# Patient Record
Sex: MALE | Race: WHITE | NOT HISPANIC OR LATINO
[De-identification: names, ages, dates, MRNs, and addresses within clinical notes are randomized per-mention and may not be internally consistent; named-entity substitution may affect disease eponyms.]

---

## 2019-04-23 ENCOUNTER — LABORATORY RESULT (OUTPATIENT)
Age: 69
End: 2019-04-23

## 2019-04-23 ENCOUNTER — APPOINTMENT (OUTPATIENT)
Dept: INTERNAL MEDICINE | Facility: CLINIC | Age: 69
End: 2019-04-23
Payer: MEDICARE

## 2019-04-23 VITALS
TEMPERATURE: 97.8 F | SYSTOLIC BLOOD PRESSURE: 156 MMHG | DIASTOLIC BLOOD PRESSURE: 90 MMHG | RESPIRATION RATE: 16 BRPM | BODY MASS INDEX: 30.67 KG/M2 | OXYGEN SATURATION: 98 % | HEART RATE: 75 BPM | WEIGHT: 202.38 LBS | HEIGHT: 68 IN

## 2019-04-23 VITALS — SYSTOLIC BLOOD PRESSURE: 132 MMHG | DIASTOLIC BLOOD PRESSURE: 80 MMHG

## 2019-04-23 DIAGNOSIS — Z82.5 FAMILY HISTORY OF ASTHMA AND OTHER CHRONIC LOWER RESPIRATORY DISEASES: ICD-10-CM

## 2019-04-23 DIAGNOSIS — Z83.3 FAMILY HISTORY OF DIABETES MELLITUS: ICD-10-CM

## 2019-04-23 DIAGNOSIS — Z78.9 OTHER SPECIFIED HEALTH STATUS: ICD-10-CM

## 2019-04-23 PROCEDURE — 99214 OFFICE O/P EST MOD 30 MIN: CPT | Mod: 25

## 2019-04-23 PROCEDURE — 36415 COLL VENOUS BLD VENIPUNCTURE: CPT

## 2019-04-23 NOTE — PHYSICAL EXAM
[Well Nourished] : well nourished [No Acute Distress] : no acute distress [Normal Sclera/Conjunctiva] : normal sclera/conjunctiva [Well Developed] : well developed [Well-Appearing] : well-appearing [PERRL] : pupils equal round and reactive to light [EOMI] : extraocular movements intact [Normal Outer Ear/Nose] : the outer ears and nose were normal in appearance [Normal Oropharynx] : the oropharynx was normal [No JVD] : no jugular venous distention [Supple] : supple [No Lymphadenopathy] : no lymphadenopathy [Thyroid Normal, No Nodules] : the thyroid was normal and there were no nodules present [No Respiratory Distress] : no respiratory distress  [Clear to Auscultation] : lungs were clear to auscultation bilaterally [No Accessory Muscle Use] : no accessory muscle use [Normal Rate] : normal rate  [Regular Rhythm] : with a regular rhythm [Normal S1, S2] : normal S1 and S2 [No Murmur] : no murmur heard [No Carotid Bruits] : no carotid bruits [No Abdominal Bruit] : a ~M bruit was not heard ~T in the abdomen [No Varicosities] : no varicosities [Pedal Pulses Present] : the pedal pulses are present [No Extremity Clubbing/Cyanosis] : no extremity clubbing/cyanosis [No Edema] : there was no peripheral edema [Soft] : abdomen soft [No Palpable Aorta] : no palpable aorta [Non-distended] : non-distended [Non Tender] : non-tender [No HSM] : no HSM [No Masses] : no abdominal mass palpated [Normal Bowel Sounds] : normal bowel sounds [Normal Posterior Cervical Nodes] : no posterior cervical lymphadenopathy [No CVA Tenderness] : no CVA  tenderness [Normal Anterior Cervical Nodes] : no anterior cervical lymphadenopathy [No Spinal Tenderness] : no spinal tenderness [No Joint Swelling] : no joint swelling [Grossly Normal Strength/Tone] : grossly normal strength/tone [Normal Gait] : normal gait [Coordination Grossly Intact] : coordination grossly intact [No Rash] : no rash [No Focal Deficits] : no focal deficits [Deep Tendon Reflexes (DTR)] : deep tendon reflexes were 2+ and symmetric [Normal Affect] : the affect was normal [Normal Insight/Judgement] : insight and judgment were intact [de-identified] : obese

## 2019-04-23 NOTE — ADDENDUM
[FreeTextEntry1] : I, Ernie Saldaña, acted solely as scribe for Dr. Arnoldo Yoo DO on this date 04/23/2019 10:40AM .\par \par All medical record entries made by the Scribe were at my, Dr. Arnoldo Yoo DO direction and personally dictated by me on 04/23/2019 10:40AM. I have reviewed the chart and agree that the record accurately reflects my personal performance of the history, physical exam, assessment and plan. I have also personally directed, reviewed and agreed with the chart.\par

## 2019-04-23 NOTE — PLAN
[FreeTextEntry1] : Endocrinology\par hyperlipidemia-continue Niacin 500mg TID p.o.q.d., Fenofibrate 160mg p.o.q.d., Rx to be filled pending blood work results - continue low cholesterol/low fat diet - check FLP \par hypertriglyceridemia-continue Fenofibrate 160mg p.o.q.d. and omega-3 fish oil capsules BID p.o.q.d.\par hyperglycemia-continue Metformin HCl 500mg BID p.o.q.d. - continue low carbohydrate/low sugar diet \par Hematology\par vitamin D insufficiency-continue vitamin D tablets p.o.q.d. with food \par \par check male panel, thyroid panel  \par

## 2019-04-23 NOTE — ASSESSMENT
[FreeTextEntry1] : Patient is a 68 year year old male with a past medical history as above who presents for fasting blood work and general follow-up.\par

## 2019-04-23 NOTE — HISTORY OF PRESENT ILLNESS
[de-identified] : Patient is a 68 year year old male with a past medical history as below who presents for fasting blood work and general follow-up. Last blood work done on 12/4/18 showed elevated total cholesterol (261), HDL (31), elevated triglycerides (304), elevated LDL (169), elevated hemoglobin A1C (6.2%), and a sugar level of 125. Patient states he is taking all medications as prescribed and denies any adverse reactions or side effects. Patient states he exercises regularly (attends the gym every morning and walks), but has not been able to for the past month given he was away on a trip to [place]. He also states is maintaining a well-balanced diet. He notes drinking celery juice daily.  Patient states he feels well overall with no new complaints. Patient states he is seeing urologist, Dr. Oliver tomorrow for a cystoscopy. Patient inquires about prescription refill for Fenofibrate and self-testing blood-glucose levels.  [FreeTextEntry1] : fasting blood work and general follow-up\par

## 2019-04-30 ENCOUNTER — RX RENEWAL (OUTPATIENT)
Age: 69
End: 2019-04-30

## 2019-05-01 LAB
25(OH)D3 SERPL-MCNC: 64.1 NG/ML
ALBUMIN SERPL ELPH-MCNC: 4.6 G/DL
ALP BLD-CCNC: 60 U/L
ALT SERPL-CCNC: 60 U/L
ANION GAP SERPL CALC-SCNC: 14 MMOL/L
AST SERPL-CCNC: 36 U/L
BASOPHILS # BLD AUTO: 0.08 K/UL
BASOPHILS NFR BLD AUTO: 0.9 %
BILIRUB SERPL-MCNC: 0.2 MG/DL
BUN SERPL-MCNC: 26 MG/DL
CALCIUM SERPL-MCNC: 9.9 MG/DL
CHLORIDE SERPL-SCNC: 104 MMOL/L
CHOLEST SERPL-MCNC: 269 MG/DL
CHOLEST/HDLC SERPL: 8.2 RATIO
CO2 SERPL-SCNC: 23 MMOL/L
CREAT SERPL-MCNC: 1 MG/DL
EOSINOPHIL # BLD AUTO: 0.4 K/UL
EOSINOPHIL NFR BLD AUTO: 4.7 %
ESTIMATED AVERAGE GLUCOSE: 137 MG/DL
GLUCOSE SERPL-MCNC: 109 MG/DL
HBA1C MFR BLD HPLC: 6.4 %
HCT VFR BLD CALC: 47.4 %
HDLC SERPL-MCNC: 33 MG/DL
HGB BLD-MCNC: 14.3 G/DL
IMM GRANULOCYTES NFR BLD AUTO: 0.6 %
LDLC SERPL CALC-MCNC: 180 MG/DL
LYMPHOCYTES # BLD AUTO: 2.62 K/UL
LYMPHOCYTES NFR BLD AUTO: 30.7 %
MAN DIFF?: NORMAL
MCHC RBC-ENTMCNC: 26.2 PG
MCHC RBC-ENTMCNC: 30.2 GM/DL
MCV RBC AUTO: 86.8 FL
MONOCYTES # BLD AUTO: 0.71 K/UL
MONOCYTES NFR BLD AUTO: 8.3 %
NEUTROPHILS # BLD AUTO: 4.68 K/UL
NEUTROPHILS NFR BLD AUTO: 54.8 %
PLATELET # BLD AUTO: 315 K/UL
POTASSIUM SERPL-SCNC: 4.5 MMOL/L
PROT SERPL-MCNC: 7.8 G/DL
PSA SERPL-MCNC: 0.32 NG/ML
RBC # BLD: 5.46 M/UL
RBC # FLD: 14.5 %
SODIUM SERPL-SCNC: 141 MMOL/L
T3 SERPL-MCNC: 127 NG/DL
T3FREE SERPL-MCNC: 3.24 PG/ML
T3RU NFR SERPL: 1.1 TBI
T4 FREE SERPL-MCNC: 1.1 NG/DL
T4 SERPL-MCNC: 8 UG/DL
THYROGLOB AB SERPL-ACNC: <20 IU/ML
THYROPEROXIDASE AB SERPL IA-ACNC: 18.6 IU/ML
TRIGL SERPL-MCNC: 282 MG/DL
TSH SERPL-ACNC: 1.41 UIU/ML
WBC # FLD AUTO: 8.54 K/UL

## 2019-05-01 RX ORDER — BLOOD-GLUCOSE METER
KIT MISCELLANEOUS
Qty: 1 | Refills: 0 | Status: ACTIVE | COMMUNITY
Start: 2019-05-01 | End: 1900-01-01

## 2019-05-06 ENCOUNTER — RX RENEWAL (OUTPATIENT)
Age: 69
End: 2019-05-06

## 2019-05-20 ENCOUNTER — NON-APPOINTMENT (OUTPATIENT)
Age: 69
End: 2019-05-20

## 2019-05-20 ENCOUNTER — APPOINTMENT (OUTPATIENT)
Dept: INTERNAL MEDICINE | Facility: CLINIC | Age: 69
End: 2019-05-20
Payer: MEDICARE

## 2019-05-20 VITALS
WEIGHT: 202.13 LBS | DIASTOLIC BLOOD PRESSURE: 90 MMHG | TEMPERATURE: 97.9 F | BODY MASS INDEX: 30.63 KG/M2 | SYSTOLIC BLOOD PRESSURE: 166 MMHG | HEART RATE: 67 BPM | RESPIRATION RATE: 16 BRPM | HEIGHT: 68 IN | OXYGEN SATURATION: 98 %

## 2019-05-20 VITALS — SYSTOLIC BLOOD PRESSURE: 136 MMHG | DIASTOLIC BLOOD PRESSURE: 82 MMHG

## 2019-05-20 DIAGNOSIS — Z85.51 PERSONAL HISTORY OF MALIGNANT NEOPLASM OF BLADDER: ICD-10-CM

## 2019-05-20 PROCEDURE — 93000 ELECTROCARDIOGRAM COMPLETE: CPT

## 2019-05-20 PROCEDURE — 99214 OFFICE O/P EST MOD 30 MIN: CPT | Mod: 25

## 2019-05-20 NOTE — ASSESSMENT
[Patient Optimized for Surgery] : Patient optimized for surgery [No Further Testing Recommended] : no further testing recommended [Modify medications prior to procedure] : Modify medications prior to procedure [FreeTextEntry4] : The patient is 68 year male who is a low risk surgical candidate with good functional capacity going for a low-intermediate risk surgical procedure. \par  [FreeTextEntry7] : Stop Aspirin and Fish-oil 1 week prior to procedure. Stop all other medications 1 day prior to procedure.

## 2019-05-20 NOTE — ADDENDUM
[FreeTextEntry1] : I, Ernie Saldaña, acted solely as scribe for Dr. Arnoldo Yoo DO on this date 05/20/2019 12:00PM .\par \par All medical record entries made by the Scribe were at my, Dr. Arnoldo Yoo DO direction and personally dictated by me on 05/20/2019 12:00PM. I have reviewed the chart and agree that the record accurately reflects my personal performance of the history, physical exam, assessment and plan. I have also personally directed, reviewed and agreed with the chart.\par

## 2019-05-20 NOTE — PLAN
[FreeTextEntry1] : 1. Preoperative EKG performed - see above - acceptable\par 2. The patient was instructed to stop eating prior to midnight the evening before the procedure.\par 3. The patient was advised to stop Aspirin and Fish-oil 1 week prior to the procedure. He was advised to stop all other medications on the day of procedure.\par 4. There is no medical contraindication to the planned procedure and the patient is therefore medically optimized and is therefore medically cleared for the procedure.\par \par check EKG, normal  \par Blood work done with Dr. Oliver.

## 2019-05-20 NOTE — PHYSICAL EXAM

## 2019-05-20 NOTE — HISTORY OF PRESENT ILLNESS
[No Pertinent Cardiac History] : no history of aortic stenosis, atrial fibrillation, coronary artery disease, recent myocardial infarction, or implantable device/pacemaker [No Pertinent Pulmonary History] : no history of asthma, COPD, sleep apnea, or smoking [No Adverse Anesthesia Reaction] : no adverse anesthesia reaction in self or family member [(Patient denies any chest pain, claudication, dyspnea on exertion, orthopnea, palpitations or syncope)] : Patient denies any chest pain, claudication, dyspnea on exertion, orthopnea, palpitations or syncope [Good (7-10 METs)] : Good (7-10 METs) [NSAIDs: _____] : NSAIDs: [unfilled] [Chronic Anticoagulation] : no chronic anticoagulation [Chronic Kidney Disease] : no chronic kidney disease [Diabetes] : no diabetes [FreeTextEntry1] : cystoscopy  [FreeTextEntry2] : 5/24/19 [FreeTextEntry3] : Dr. Oliver  [FreeTextEntry4] : Patient is a 68 year male with a past medical history as below who presents for preoperative evaluation prior to a cystoscopy. The procedure will be performed by Dr. Oliver at [facility]. The patient has no history of allergy or adverse reaction to anesthesia. The patient can walk many blocks and can walk up one to 2 flights of stairs without dyspnea on exertion. The patient denies chest pain or palpitations.

## 2019-05-23 ENCOUNTER — RX RENEWAL (OUTPATIENT)
Age: 69
End: 2019-05-23

## 2019-07-08 ENCOUNTER — MEDICATION RENEWAL (OUTPATIENT)
Age: 69
End: 2019-07-08

## 2019-07-15 ENCOUNTER — MEDICATION RENEWAL (OUTPATIENT)
Age: 69
End: 2019-07-15

## 2019-09-19 ENCOUNTER — LABORATORY RESULT (OUTPATIENT)
Age: 69
End: 2019-09-19

## 2019-09-19 ENCOUNTER — APPOINTMENT (OUTPATIENT)
Dept: INTERNAL MEDICINE | Facility: CLINIC | Age: 69
End: 2019-09-19
Payer: MEDICARE

## 2019-09-19 VITALS
RESPIRATION RATE: 14 BRPM | HEIGHT: 68 IN | BODY MASS INDEX: 30.39 KG/M2 | WEIGHT: 200.5 LBS | OXYGEN SATURATION: 98 % | DIASTOLIC BLOOD PRESSURE: 92 MMHG | SYSTOLIC BLOOD PRESSURE: 168 MMHG | HEART RATE: 68 BPM | TEMPERATURE: 97.9 F

## 2019-09-19 VITALS — SYSTOLIC BLOOD PRESSURE: 140 MMHG | DIASTOLIC BLOOD PRESSURE: 90 MMHG

## 2019-09-19 VITALS — DIASTOLIC BLOOD PRESSURE: 90 MMHG | SYSTOLIC BLOOD PRESSURE: 140 MMHG

## 2019-09-19 PROCEDURE — 36415 COLL VENOUS BLD VENIPUNCTURE: CPT

## 2019-09-19 PROCEDURE — 99214 OFFICE O/P EST MOD 30 MIN: CPT | Mod: 25

## 2019-09-19 NOTE — PHYSICAL EXAM
[No Acute Distress] : no acute distress [Well Nourished] : well nourished [Well Developed] : well developed [Normal Sclera/Conjunctiva] : normal sclera/conjunctiva [Well-Appearing] : well-appearing [PERRL] : pupils equal round and reactive to light [EOMI] : extraocular movements intact [Normal Outer Ear/Nose] : the outer ears and nose were normal in appearance [Normal Oropharynx] : the oropharynx was normal [No Lymphadenopathy] : no lymphadenopathy [No JVD] : no jugular venous distention [Supple] : supple [No Respiratory Distress] : no respiratory distress  [Thyroid Normal, No Nodules] : the thyroid was normal and there were no nodules present [No Accessory Muscle Use] : no accessory muscle use [Clear to Auscultation] : lungs were clear to auscultation bilaterally [Normal Rate] : normal rate  [Regular Rhythm] : with a regular rhythm [Normal S1, S2] : normal S1 and S2 [No Carotid Bruits] : no carotid bruits [No Murmur] : no murmur heard [No Abdominal Bruit] : a ~M bruit was not heard ~T in the abdomen [Pedal Pulses Present] : the pedal pulses are present [No Varicosities] : no varicosities [No Edema] : there was no peripheral edema [No Palpable Aorta] : no palpable aorta [No Extremity Clubbing/Cyanosis] : no extremity clubbing/cyanosis [Non Tender] : non-tender [Soft] : abdomen soft [Non-distended] : non-distended [No Masses] : no abdominal mass palpated [Normal Bowel Sounds] : normal bowel sounds [No HSM] : no HSM [Normal Posterior Cervical Nodes] : no posterior cervical lymphadenopathy [Normal Anterior Cervical Nodes] : no anterior cervical lymphadenopathy [No CVA Tenderness] : no CVA  tenderness [No Joint Swelling] : no joint swelling [No Spinal Tenderness] : no spinal tenderness [Grossly Normal Strength/Tone] : grossly normal strength/tone [Coordination Grossly Intact] : coordination grossly intact [No Rash] : no rash [No Focal Deficits] : no focal deficits [Normal Gait] : normal gait [Deep Tendon Reflexes (DTR)] : deep tendon reflexes were 2+ and symmetric [Normal Affect] : the affect was normal [Normal Insight/Judgement] : insight and judgment were intact [de-identified] : obese

## 2019-09-19 NOTE — PLAN
[FreeTextEntry1] : Endocrinology\par hyperglycemia - continue Metformin HCl 1000mg p.o.q.d. - continue low carbohydrate/low sugar diet - check hemoglobin A1C\par hyperlipidemia/hypertriglyceridemia - continue Fenofibrate 160mg p.o.q.d. and Niacin 500mg TID p.o.q.d. as directed - continue low cholesterol/low fat diet - check FLP \par borderline-abnormal TFTs - check thyroid panel \par vitamin D insufficiency - continue vitamin D tablets p.o.q.d. - check vitamin D\par Gastroenterology\par screening colonoscopy - follow up with gastroenterologist \par Urology\par Follow up with urologist, Dr. Oliver \par Infectious Disease\par flu vaccine - discussed, refused \par Prevnar 13 - discussed benefits of receiving the vaccine in detail, refused \par \par check male panel and thyroid panel

## 2019-09-19 NOTE — ASSESSMENT
[FreeTextEntry1] : Patient is a 69 year old male with a past medical history as above who presents for fasting blood work and general follow-up.

## 2019-09-19 NOTE — HISTORY OF PRESENT ILLNESS
[FreeTextEntry1] : fasting blood work and general follow-up\par  [de-identified] : Patient is a 69 year old male with a past medical history as below who presents for fasting blood work and general follow-up. Patient states he has been taking Metformin once daily as he noted lightheadedness/dizziness while taking the medication BID. He notes self-recorded blood-glucose levels typically range from 93 to 115. Patient notes seeing urologist, Dr. Oliver recently. Cystoscopy was done on 5/24/19. 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 Patient states he will be following up with his gastroenterologist for a screening colonoscopy. Patient states he has been trying to maintain a well-balanced, low carbohydrate diet. He notes occasional CV exercise (walking). Patient is due for a pneumonia shot.

## 2019-09-19 NOTE — ADDENDUM
[FreeTextEntry1] : I, Ernie Saldaña, acted solely as scribe for Dr. Arnoldo Yoo DO on this date 09/19/2019 10:00AM .\par \par All medical record entries made by the Scribe were at my, Dr. Anroldo Yoo DO direction and personally dictated by me on 09/19/2019 10:00AM. I have reviewed the chart and agree that the record accurately reflects my personal performance of the history, physical exam, assessment and plan. I have also personally directed, reviewed and agreed with the chart.\par

## 2019-09-23 LAB
25(OH)D3 SERPL-MCNC: 64.2 NG/ML
ALBUMIN SERPL ELPH-MCNC: 4.7 G/DL
ALP BLD-CCNC: 60 U/L
ALT SERPL-CCNC: 68 U/L
ANION GAP SERPL CALC-SCNC: 13 MMOL/L
AST SERPL-CCNC: 50 U/L
BASOPHILS # BLD AUTO: 0.07 K/UL
BASOPHILS NFR BLD AUTO: 0.9 %
BILIRUB SERPL-MCNC: 0.2 MG/DL
BUN SERPL-MCNC: 23 MG/DL
CALCIUM SERPL-MCNC: 10.4 MG/DL
CHLORIDE SERPL-SCNC: 106 MMOL/L
CHOLEST SERPL-MCNC: 246 MG/DL
CHOLEST/HDLC SERPL: 7.7 RATIO
CO2 SERPL-SCNC: 23 MMOL/L
CREAT SERPL-MCNC: 1.12 MG/DL
EOSINOPHIL # BLD AUTO: 0.45 K/UL
EOSINOPHIL NFR BLD AUTO: 5.9 %
ESTIMATED AVERAGE GLUCOSE: 140 MG/DL
GLUCOSE SERPL-MCNC: 103 MG/DL
HBA1C MFR BLD HPLC: 6.5 %
HCT VFR BLD CALC: 48.7 %
HDLC SERPL-MCNC: 32 MG/DL
HGB BLD-MCNC: 14.8 G/DL
IMM GRANULOCYTES NFR BLD AUTO: 0.3 %
LDLC SERPL CALC-MCNC: 165 MG/DL
LYMPHOCYTES # BLD AUTO: 2.56 K/UL
LYMPHOCYTES NFR BLD AUTO: 33.8 %
MAN DIFF?: NORMAL
MCHC RBC-ENTMCNC: 27.7 PG
MCHC RBC-ENTMCNC: 30.4 GM/DL
MCV RBC AUTO: 91 FL
MONOCYTES # BLD AUTO: 0.58 K/UL
MONOCYTES NFR BLD AUTO: 7.7 %
NEUTROPHILS # BLD AUTO: 3.9 K/UL
NEUTROPHILS NFR BLD AUTO: 51.4 %
PLATELET # BLD AUTO: 327 K/UL
POTASSIUM SERPL-SCNC: 5 MMOL/L
PROT SERPL-MCNC: 7.7 G/DL
PSA SERPL-MCNC: 0.36 NG/ML
RBC # BLD: 5.35 M/UL
RBC # FLD: 15.1 %
SODIUM SERPL-SCNC: 142 MMOL/L
T3 SERPL-MCNC: 118 NG/DL
T3FREE SERPL-MCNC: 2.98 PG/ML
T3RU NFR SERPL: 1.1 TBI
T4 FREE SERPL-MCNC: 1.2 NG/DL
T4 SERPL-MCNC: 7.9 UG/DL
THYROGLOB AB SERPL-ACNC: <20 IU/ML
THYROPEROXIDASE AB SERPL IA-ACNC: 19.8 IU/ML
TRIGL SERPL-MCNC: 245 MG/DL
TSH SERPL-ACNC: 1.2 UIU/ML
WBC # FLD AUTO: 7.58 K/UL

## 2019-12-06 ENCOUNTER — APPOINTMENT (OUTPATIENT)
Dept: INTERNAL MEDICINE | Facility: CLINIC | Age: 69
End: 2019-12-06
Payer: MEDICARE

## 2019-12-06 ENCOUNTER — RX RENEWAL (OUTPATIENT)
Age: 69
End: 2019-12-06

## 2019-12-06 VITALS
WEIGHT: 20 LBS | DIASTOLIC BLOOD PRESSURE: 82 MMHG | BODY MASS INDEX: 3.03 KG/M2 | OXYGEN SATURATION: 98 % | SYSTOLIC BLOOD PRESSURE: 162 MMHG | HEART RATE: 81 BPM | RESPIRATION RATE: 16 BRPM | TEMPERATURE: 97.8 F | HEIGHT: 68 IN

## 2019-12-06 DIAGNOSIS — R94.6 ABNORMAL RESULTS OF THYROID FUNCTION STUDIES: ICD-10-CM

## 2019-12-06 DIAGNOSIS — R09.82 POSTNASAL DRIP: ICD-10-CM

## 2019-12-06 DIAGNOSIS — R01.1 CARDIAC MURMUR, UNSPECIFIED: ICD-10-CM

## 2019-12-06 DIAGNOSIS — K63.5 POLYP OF COLON: ICD-10-CM

## 2019-12-06 DIAGNOSIS — R09.89 OTHER SPECIFIED SYMPTOMS AND SIGNS INVOLVING THE CIRCULATORY AND RESPIRATORY SYSTEMS: ICD-10-CM

## 2019-12-06 LAB
BILIRUB UR QL STRIP: NEGATIVE
CLARITY UR: CLEAR
COLLECTION METHOD: NORMAL
GLUCOSE UR-MCNC: NEGATIVE
HCG UR QL: 0.2 EU/DL
HGB UR QL STRIP.AUTO: NEGATIVE
KETONES UR-MCNC: NEGATIVE
LEUKOCYTE ESTERASE UR QL STRIP: NEGATIVE
NITRITE UR QL STRIP: NEGATIVE
PH UR STRIP: 5.5
PROT UR STRIP-MCNC: NEGATIVE
SP GR UR STRIP: 1.03

## 2019-12-06 PROCEDURE — 81003 URINALYSIS AUTO W/O SCOPE: CPT | Mod: QW

## 2019-12-06 PROCEDURE — G0442 ANNUAL ALCOHOL SCREEN 15 MIN: CPT | Mod: 59

## 2019-12-06 PROCEDURE — G0444 DEPRESSION SCREEN ANNUAL: CPT | Mod: 59

## 2019-12-06 PROCEDURE — 36415 COLL VENOUS BLD VENIPUNCTURE: CPT

## 2019-12-06 PROCEDURE — 99214 OFFICE O/P EST MOD 30 MIN: CPT | Mod: 25

## 2019-12-11 PROBLEM — K63.5 COLON POLYPS: Status: ACTIVE | Noted: 2019-12-11

## 2019-12-11 NOTE — HISTORY OF PRESENT ILLNESS
[FreeTextEntry1] : Check up and fasting blood test  [de-identified] : Mr. MASON is a 69 year  male, who present to the office for check up, fasting labs and renewal of medications.  \par Patient states he is doing well on his current medication regimen. Denies any side effects to his current medication regimen.  \par Patient states a few days ago she started with a scratchy throat.  Patient denies fever chills or night sweats.  Denies taking any over-the-counter medication.  Discomfort is exacerbated with swallowing

## 2019-12-11 NOTE — COUNSELING
[AUDIT-C Screening administered and reviewed] : AUDIT-C Screening administered and reviewed [Potential consequences of obesity discussed] : Potential consequences of obesity discussed [Encouraged to increase physical activity] : Encouraged to increase physical activity [Decrease Portions] : decrease portions [Good understanding] : Patient has a good understanding of disease, goals and obesity follow-up plan [FreeTextEntry2] : ADA diet

## 2019-12-11 NOTE — PLAN
[FreeTextEntry1] : Cardio - heart murmur and left carotid bruit on PE-   Advised pt to get carotid sonogram and echocardiogram.\par \par Endocrinology\par hyperglycemia - continue Metformin HCl 500 bid . - continue low carbohydrate/low sugar diet - check hemoglobin A1C and microalbumin.  \par hyperlipidemia/hypertriglyceridemia - continue Fenofibrate 160mg p.o.q.d. and Niacin 500mg TID p.o.q.d. as directed - continue low cholesterol/low fat diet - check FLP. refilled medication.\par borderline-abnormal TFTs - check thyroid panel \par vitamin D insufficiency - continue vitamin D tablets p.o.q.d. - check vitamin D\par Gastroenterology\par screening colonoscopy - follow up with gastroenterologist has apt 12/20/19\par Urology\par Follow up with urologist, Dr. Oliver \par Infectious Disease\par flu vaccine - discussed, refused \par Prevnar 13 - discussed benefits of receiving the vaccine in detail, refused \par \par check male panel and thyroid panel.\par We discussed the importance of healthy lifestyles which include exercise, weight control and good diet.\par Patient's questions were answered in full detail. Advise patient if any other concerns arise to please call office to have a discussion.

## 2019-12-11 NOTE — ASSESSMENT
[FreeTextEntry1] : Patient is a 69 year old male with a past medical history as above who presents for fasting blood, renewal of medication, work and general follow-up.

## 2019-12-11 NOTE — PHYSICAL EXAM
[No Acute Distress] : no acute distress [Well Nourished] : well nourished [Well Developed] : well developed [Normal Sclera/Conjunctiva] : normal sclera/conjunctiva [Well-Appearing] : well-appearing [PERRL] : pupils equal round and reactive to light [EOMI] : extraocular movements intact [Normal Outer Ear/Nose] : the outer ears and nose were normal in appearance [Normal Oropharynx] : the oropharynx was normal [No JVD] : no jugular venous distention [No Lymphadenopathy] : no lymphadenopathy [Supple] : supple [Thyroid Normal, No Nodules] : the thyroid was normal and there were no nodules present [No Accessory Muscle Use] : no accessory muscle use [No Respiratory Distress] : no respiratory distress  [Clear to Auscultation] : lungs were clear to auscultation bilaterally [Normal Rate] : normal rate  [Normal S1, S2] : normal S1 and S2 [Regular Rhythm] : with a regular rhythm [No Abdominal Bruit] : a ~M bruit was not heard ~T in the abdomen [Pedal Pulses Present] : the pedal pulses are present [No Palpable Aorta] : no palpable aorta [No Edema] : there was no peripheral edema [No Extremity Clubbing/Cyanosis] : no extremity clubbing/cyanosis [Soft] : abdomen soft [Non Tender] : non-tender [Non-distended] : non-distended [No Masses] : no abdominal mass palpated [No HSM] : no HSM [Normal Bowel Sounds] : normal bowel sounds [Normal Posterior Cervical Nodes] : no posterior cervical lymphadenopathy [Normal Anterior Cervical Nodes] : no anterior cervical lymphadenopathy [No CVA Tenderness] : no CVA  tenderness [No Spinal Tenderness] : no spinal tenderness [No Joint Swelling] : no joint swelling [Grossly Normal Strength/Tone] : grossly normal strength/tone [No Rash] : no rash [Coordination Grossly Intact] : coordination grossly intact [No Focal Deficits] : no focal deficits [Normal Gait] : normal gait [Deep Tendon Reflexes (DTR)] : deep tendon reflexes were 2+ and symmetric [Normal Affect] : the affect was normal [Normal Insight/Judgement] : insight and judgment were intact [Normal TMs] : both tympanic membranes were normal [Alert and Oriented x3] : oriented to person, place, and time [Speech Grossly Normal] : speech grossly normal [Normal Mood] : the mood was normal [de-identified] : post nasal drip [de-identified] : Left bruit  [de-identified] : with murmur  2-3/6 [de-identified] : left bruit  [de-identified] : obese

## 2019-12-11 NOTE — HEALTH RISK ASSESSMENT
[0] : 2) Feeling down, depressed, or hopeless: Not at all (0) [Yes] : Yes [Monthly or less (1 pt)] : Monthly or less (1 point) [1 or 2 (0 pts)] : 1 or 2 (0 points) [Never (0 pts)] : Never (0 points) [No] : In the past 12 months have you used drugs other than those required for medical reasons? No [No falls in past year] : Patient reported no falls in the past year [Audit-CScore] : 1 [de-identified] : ADA diet  [[id number]] : 0 [Patient reported colonoscopy was abnormal] : Patient reported colonoscopy was abnormal [ColonoscopyDate] : 09/15 [ColonoscopyComments] : colon polyp  repeat 09/2020

## 2019-12-16 ENCOUNTER — APPOINTMENT (OUTPATIENT)
Dept: INTERNAL MEDICINE | Facility: CLINIC | Age: 69
End: 2019-12-16
Payer: MEDICARE

## 2019-12-16 VITALS
HEIGHT: 68 IN | SYSTOLIC BLOOD PRESSURE: 160 MMHG | RESPIRATION RATE: 16 BRPM | OXYGEN SATURATION: 97 % | BODY MASS INDEX: 30.62 KG/M2 | DIASTOLIC BLOOD PRESSURE: 82 MMHG | HEART RATE: 72 BPM | WEIGHT: 202 LBS | TEMPERATURE: 98.3 F

## 2019-12-16 DIAGNOSIS — I34.0 NONRHEUMATIC MITRAL (VALVE) INSUFFICIENCY: ICD-10-CM

## 2019-12-16 LAB
25(OH)D3 SERPL-MCNC: 90.1 NG/ML
ALBUMIN SERPL ELPH-MCNC: 4.6 G/DL
ALP BLD-CCNC: 61 U/L
ALT SERPL-CCNC: 78 U/L
ANION GAP SERPL CALC-SCNC: 16 MMOL/L
AST SERPL-CCNC: 58 U/L
BASOPHILS # BLD AUTO: 0.09 K/UL
BASOPHILS NFR BLD AUTO: 1.1 %
BILIRUB SERPL-MCNC: 0.2 MG/DL
BUN SERPL-MCNC: 20 MG/DL
CALCIUM SERPL-MCNC: 9.7 MG/DL
CHLORIDE SERPL-SCNC: 102 MMOL/L
CHOLEST SERPL-MCNC: 268 MG/DL
CHOLEST/HDLC SERPL: 7.7 RATIO
CO2 SERPL-SCNC: 22 MMOL/L
CREAT SERPL-MCNC: 0.99 MG/DL
CREAT SPEC-SCNC: 126 MG/DL
EOSINOPHIL # BLD AUTO: 0.41 K/UL
EOSINOPHIL NFR BLD AUTO: 5 %
ESTIMATED AVERAGE GLUCOSE: 140 MG/DL
GLUCOSE SERPL-MCNC: 110 MG/DL
HBA1C MFR BLD HPLC: 6.5 %
HCT VFR BLD CALC: 48.6 %
HDLC SERPL-MCNC: 35 MG/DL
HGB BLD-MCNC: 15.2 G/DL
IMM GRANULOCYTES NFR BLD AUTO: 0.4 %
LDLC SERPL CALC-MCNC: 189 MG/DL
LYMPHOCYTES # BLD AUTO: 2.25 K/UL
LYMPHOCYTES NFR BLD AUTO: 27.6 %
MAN DIFF?: NORMAL
MCHC RBC-ENTMCNC: 27.5 PG
MCHC RBC-ENTMCNC: 31.3 GM/DL
MCV RBC AUTO: 88 FL
MICROALBUMIN 24H UR DL<=1MG/L-MCNC: 1.7 MG/DL
MICROALBUMIN/CREAT 24H UR-RTO: 14 MG/G
MONOCYTES # BLD AUTO: 0.63 K/UL
MONOCYTES NFR BLD AUTO: 7.7 %
NEUTROPHILS # BLD AUTO: 4.74 K/UL
NEUTROPHILS NFR BLD AUTO: 58.2 %
PLATELET # BLD AUTO: 322 K/UL
POTASSIUM SERPL-SCNC: 4.4 MMOL/L
PROT SERPL-MCNC: 7.9 G/DL
PSA SERPL-MCNC: 0.4 NG/ML
RBC # BLD: 5.52 M/UL
RBC # FLD: 13.7 %
SODIUM SERPL-SCNC: 140 MMOL/L
TRIGL SERPL-MCNC: 218 MG/DL
TSH SERPL-ACNC: 1.92 UIU/ML
WBC # FLD AUTO: 8.15 K/UL

## 2019-12-16 PROCEDURE — 99214 OFFICE O/P EST MOD 30 MIN: CPT

## 2019-12-16 NOTE — HISTORY OF PRESENT ILLNESS
[FreeTextEntry1] : Check up and fasting blood test  [de-identified] : Mr. MASON is a 69 year  male, who present to the office for follow up on his recent labs and carotid sonogram.  \par Patient states he is doing well on his current medication regimen.   Patient at times does get slight dizzy feeling.

## 2019-12-16 NOTE — PHYSICAL EXAM
[No Acute Distress] : no acute distress [Well Nourished] : well nourished [Well Developed] : well developed [Normal Sclera/Conjunctiva] : normal sclera/conjunctiva [Well-Appearing] : well-appearing [EOMI] : extraocular movements intact [PERRL] : pupils equal round and reactive to light [Normal Oropharynx] : the oropharynx was normal [Normal TMs] : both tympanic membranes were normal [Normal Outer Ear/Nose] : the outer ears and nose were normal in appearance [No JVD] : no jugular venous distention [No Lymphadenopathy] : no lymphadenopathy [Supple] : supple [Thyroid Normal, No Nodules] : the thyroid was normal and there were no nodules present [No Respiratory Distress] : no respiratory distress  [No Accessory Muscle Use] : no accessory muscle use [Clear to Auscultation] : lungs were clear to auscultation bilaterally [Normal Rate] : normal rate  [Regular Rhythm] : with a regular rhythm [Pedal Pulses Present] : the pedal pulses are present [Normal S1, S2] : normal S1 and S2 [No Abdominal Bruit] : a ~M bruit was not heard ~T in the abdomen [No Palpable Aorta] : no palpable aorta [No Edema] : there was no peripheral edema [No Extremity Clubbing/Cyanosis] : no extremity clubbing/cyanosis [Non Tender] : non-tender [Soft] : abdomen soft [No Masses] : no abdominal mass palpated [Non-distended] : non-distended [Normal Bowel Sounds] : normal bowel sounds [No HSM] : no HSM [Normal Posterior Cervical Nodes] : no posterior cervical lymphadenopathy [Normal Anterior Cervical Nodes] : no anterior cervical lymphadenopathy [No Spinal Tenderness] : no spinal tenderness [No CVA Tenderness] : no CVA  tenderness [Grossly Normal Strength/Tone] : grossly normal strength/tone [No Joint Swelling] : no joint swelling [Coordination Grossly Intact] : coordination grossly intact [No Rash] : no rash [No Focal Deficits] : no focal deficits [Deep Tendon Reflexes (DTR)] : deep tendon reflexes were 2+ and symmetric [Speech Grossly Normal] : speech grossly normal [Normal Gait] : normal gait [Normal Affect] : the affect was normal [Alert and Oriented x3] : oriented to person, place, and time [Normal Mood] : the mood was normal [Normal Insight/Judgement] : insight and judgment were intact [de-identified] : post nasal drip [de-identified] : b/l  bruit  [de-identified] : with murmur  2-3/6 [de-identified] : left  and right bruit  [de-identified] : obese

## 2019-12-16 NOTE — COUNSELING
[AUDIT-C Screening administered and reviewed] : AUDIT-C Screening administered and reviewed [Potential consequences of obesity discussed] : Potential consequences of obesity discussed [Decrease Portions] : decrease portions [Encouraged to increase physical activity] : Encouraged to increase physical activity [Good understanding] : Patient has a good understanding of disease, goals and obesity follow-up plan [FreeTextEntry2] : ADA diet

## 2019-12-16 NOTE — PLAN
[FreeTextEntry1] : Cardio - Carotid stenosis on sonogram 70-79 % advised to get MRA of the neck to evaluate the stenosis.  Advised pt to start Statin pt refuses.  Education given about the medication and complication secondary to having DM, Hyperlipidemia and stenosis.  \par \par Endocrinology\par hyperglycemia - continue Metformin HCl 500 bid . - continue low carbohydrate/low sugar diet - check hemoglobin A1C and microalbumin  every 3 months.  Advised to start statin therapy.  \par \par hyperlipidemia/hypertriglyceridemia - continue Fenofibrate 160mg p.o.q.d. and Niacin 500mg TID p.o.q.d. as directed - continue low cholesterol/low fat diet - check FLP. refilled medication.  Refused to start statins education given.  also discussed repatha.  Pt with intolerance to .Lipitor, Crestor, pravastatin.  \par borderline-abnormal TFTs - check thyroid panel \par vitamin D insufficiency - continue vitamin D tablets p.o.q.d. - check vitamin D\par Gastroenterology\par screening colonoscopy - follow up with gastroenterologist has apt 12/20/19\par Urology\par Follow up with urologist, Dr. Oliver \par Infectious Disease\par flu vaccine - discussed, refused \par Prevnar 13 - discussed benefits of receiving the vaccine in detail, refused \par \par We discussed the importance of healthy lifestyles which include exercise, weight control and good diet.\par Patient's questions were answered in full detail. Advise patient if any other concerns arise to please call office to have a discussion.

## 2019-12-16 NOTE — HEALTH RISK ASSESSMENT
[Yes] : Yes [Monthly or less (1 pt)] : Monthly or less (1 point) [1 or 2 (0 pts)] : 1 or 2 (0 points) [Never (0 pts)] : Never (0 points) [No] : In the past 12 months have you used drugs other than those required for medical reasons? No [No falls in past year] : Patient reported no falls in the past year [0] : 2) Feeling down, depressed, or hopeless: Not at all (0) [Patient reported colonoscopy was abnormal] : Patient reported colonoscopy was abnormal [Audit-CScore] : 1 [de-identified] : ADA diet  [[id number]] : 0 [ColonoscopyDate] : 09/15 [ColonoscopyComments] : colon polyp  repeat 09/2020

## 2019-12-16 NOTE — ASSESSMENT
[FreeTextEntry1] : Patient is a 69 year old male with a past medical history as above who presents for follow up and to discuss recent ancillary testing

## 2019-12-16 NOTE — DATA REVIEWED
[No studies available for review at this time.] : No studies available for review at this time. [FreeTextEntry1] : Reviewed echo and carotid\par

## 2019-12-27 ENCOUNTER — RX RENEWAL (OUTPATIENT)
Age: 69
End: 2019-12-27

## 2019-12-30 ENCOUNTER — RESULT CHARGE (OUTPATIENT)
Age: 69
End: 2019-12-30

## 2019-12-30 ENCOUNTER — APPOINTMENT (OUTPATIENT)
Dept: VASCULAR SURGERY | Facility: CLINIC | Age: 69
End: 2019-12-30
Payer: MEDICARE

## 2019-12-30 VITALS
DIASTOLIC BLOOD PRESSURE: 85 MMHG | TEMPERATURE: 98.1 F | BODY MASS INDEX: 30.62 KG/M2 | WEIGHT: 202 LBS | SYSTOLIC BLOOD PRESSURE: 177 MMHG | HEART RATE: 59 BPM | HEIGHT: 68 IN

## 2019-12-30 PROCEDURE — 93880 EXTRACRANIAL BILAT STUDY: CPT

## 2019-12-30 PROCEDURE — 99204 OFFICE O/P NEW MOD 45 MIN: CPT

## 2019-12-30 RX ORDER — CALCIUM CARBONATE 260MG(650)
500 TABLET,CHEWABLE ORAL
Refills: 0 | Status: COMPLETED | COMMUNITY
End: 2019-12-30

## 2019-12-30 RX ORDER — ASPIRIN 81 MG
81 TABLET, DELAYED RELEASE (ENTERIC COATED) ORAL
Refills: 0 | Status: ACTIVE | COMMUNITY

## 2019-12-30 NOTE — PHYSICAL EXAM
[No Rash or Lesion] : No rash or lesion [Calm] : calm [JVD] : no jugular venous distention  [Normal Breath Sounds] : Normal breath sounds [Normal Heart Sounds] : normal heart sounds [Right Carotid Bruit] : right carotid bruit heard [2+] : left 2+ [Ankle Swelling (On Exam)] : not present [] : not present [Varicose Veins Of Lower Extremities] : not present [Skin Ulcer] : no ulcer [Abdomen Masses] : No abdominal masses [Oriented to Person] : oriented to person [Oriented to Place] : oriented to place [de-identified] : appears well  [de-identified] : no facial asymmetry

## 2019-12-30 NOTE — ASSESSMENT
[FreeTextEntry1] : 70 yo male former smoker with history of hld (unable to tolerate statin), dm presents for evaluation of carotid stenosis\par duplex shows severe right ica stenosis with ratio of 6  \par given history of tia and severe stenosis will arrange for right cea

## 2019-12-30 NOTE — HISTORY OF PRESENT ILLNESS
[FreeTextEntry1] : 70 yo male former smoker with history of hld (unable to tolerate statin), dm presents for evaluation of carotid stenosis.  pt denies any history of cva \par upon further questioning pt reports history of tia with upper extremity motor deficit about 6 months ago.

## 2019-12-31 ENCOUNTER — APPOINTMENT (OUTPATIENT)
Dept: INTERNAL MEDICINE | Facility: CLINIC | Age: 69
End: 2019-12-31
Payer: MEDICARE

## 2019-12-31 ENCOUNTER — NON-APPOINTMENT (OUTPATIENT)
Age: 69
End: 2019-12-31

## 2019-12-31 VITALS
OXYGEN SATURATION: 94 % | BODY MASS INDEX: 30.77 KG/M2 | HEART RATE: 90 BPM | WEIGHT: 203 LBS | TEMPERATURE: 98.3 F | DIASTOLIC BLOOD PRESSURE: 80 MMHG | SYSTOLIC BLOOD PRESSURE: 154 MMHG | RESPIRATION RATE: 25 BRPM | HEIGHT: 68 IN

## 2019-12-31 PROCEDURE — 99214 OFFICE O/P EST MOD 30 MIN: CPT | Mod: 25

## 2019-12-31 PROCEDURE — 36415 COLL VENOUS BLD VENIPUNCTURE: CPT

## 2019-12-31 PROCEDURE — 93000 ELECTROCARDIOGRAM COMPLETE: CPT

## 2019-12-31 NOTE — COUNSELING
[Good understanding] : Patient has a good understanding of disease, goals and obesity follow-up plan [Weight management counseling provided] : Weight management [Low Fat Diet] : Low fat diet [Low Salt Diet] : Low salt diet [Walking] : Walking [de-identified] : ADA diet 1800 frederic

## 2019-12-31 NOTE — ASSESSMENT
[Patient Optimized for Surgery] : Patient optimized for surgery [Cardiology consultation] : Cardiology consultation [Modify anti-platelet treatment prior to procedure] : Modify anti-platelet treatment prior to procedure [FreeTextEntry4] : A 69  year old male present to the office for medical clearance for Right  CEA  [FreeTextEntry6] : Hold ASA 7 day prior to procedure

## 2019-12-31 NOTE — RESULTS/DATA
[] : results reviewed [de-identified] : NSR rate 88 T wave inversion V4,V5, V6  no change from 5/9 [de-identified] : Pending CBC,BMP, PT/INR, PTT, ABO, and U/A

## 2019-12-31 NOTE — HISTORY OF PRESENT ILLNESS
[Diabetes] : diabetes [Smoker] : smoker [Chronic Anticoagulation] : chronic anticoagulation [(Patient denies any chest pain, claudication, dyspnea on exertion, orthopnea, palpitations or syncope)] : Patient denies any chest pain, claudication, dyspnea on exertion, orthopnea, palpitations or syncope [Other: _____] : [unfilled] [Spouse] : spouse [Atrial Fibrillation] : no atrial fibrillation [Aortic Stenosis] : no aortic stenosis [Asthma] : no asthma [Coronary Artery Disease] : no coronary artery disease [Recent Myocardial Infarction] : no recent myocardial infarction [Implantable Device/Pacemaker] : no implantable device/pacemaker [Family Member] : no family member with adverse anesthesia reaction/sudden death [COPD] : no COPD [Sleep Apnea] : no sleep apnea [Chronic Kidney Disease] : no chronic kidney disease [FreeTextEntry1] : Right CEA  [Self] : no previous adverse anesthesia reaction [FreeTextEntry4] : Mr. MASON is a 69 year  male, who present to the office for medical clearance for right carotid endarterectomy.  Patient recently had a MRA which showed 90% occlusion.  Saw Dr. Nance who schedule for CEA on 01/09/19.   [FreeTextEntry2] : 01/09/2020 [FreeTextEntry3] :   [FreeTextEntry5] : Tobacco use quit 10 year ago

## 2019-12-31 NOTE — PHYSICAL EXAM
[Well Developed] : well developed [Well Nourished] : well nourished [No Acute Distress] : no acute distress [Normal Sclera/Conjunctiva] : normal sclera/conjunctiva [Well-Appearing] : well-appearing [PERRL] : pupils equal round and reactive to light [EOMI] : extraocular movements intact [Normal Outer Ear/Nose] : the outer ears and nose were normal in appearance [Normal Oropharynx] : the oropharynx was normal [No JVD] : no jugular venous distention [No Lymphadenopathy] : no lymphadenopathy [Supple] : supple [Thyroid Normal, No Nodules] : the thyroid was normal and there were no nodules present [No Respiratory Distress] : no respiratory distress  [No Accessory Muscle Use] : no accessory muscle use [Normal Rate] : normal rate  [Regular Rhythm] : with a regular rhythm [No Murmur] : no murmur heard [Normal S1, S2] : normal S1 and S2 [No Carotid Bruits] : no carotid bruits [No Abdominal Bruit] : a ~M bruit was not heard ~T in the abdomen [Pedal Pulses Present] : the pedal pulses are present [No Edema] : there was no peripheral edema [Soft] : abdomen soft [No Palpable Aorta] : no palpable aorta [No Extremity Clubbing/Cyanosis] : no extremity clubbing/cyanosis [Non Tender] : non-tender [Non-distended] : non-distended [No Masses] : no abdominal mass palpated [Normal Bowel Sounds] : normal bowel sounds [No HSM] : no HSM [Normal Anterior Cervical Nodes] : no anterior cervical lymphadenopathy [Normal Posterior Cervical Nodes] : no posterior cervical lymphadenopathy [No CVA Tenderness] : no CVA  tenderness [No Spinal Tenderness] : no spinal tenderness [No Joint Swelling] : no joint swelling [Grossly Normal Strength/Tone] : grossly normal strength/tone [No Rash] : no rash [Coordination Grossly Intact] : coordination grossly intact [No Focal Deficits] : no focal deficits [Deep Tendon Reflexes (DTR)] : deep tendon reflexes were 2+ and symmetric [Normal Gait] : normal gait [Normal Insight/Judgement] : insight and judgment were intact [Normal Affect] : the affect was normal [Normal TMs] : both tympanic membranes were normal [Speech Grossly Normal] : speech grossly normal [Alert and Oriented x3] : oriented to person, place, and time [Normal Mood] : the mood was normal [de-identified] : decrease breath sounds  [de-identified] : b/l  bruit R>L [de-identified] : with murmur  2-3/6 [de-identified] : left  and right bruit  [de-identified] : obese

## 2019-12-31 NOTE — PLAN
[FreeTextEntry1] : Discussed and reviewed  medical clearance with Dr. Arnoldo Yoo.  \par \par 1. Preoperative EKG performed - see above - No acute changes noted \par 2. Follow up with Cardiologist   for a cardiac clearance on 01/02/19\par 3.  The patient was instructed to stop eating prior to midnight the evening before the procedure.\par 4. The patient was advised to stop medications 1 day prior to the procedure.\par 5. Hold ASA , Omega 3, Fish oil, MVI, Vitamin E 7 days prior to procedure \par 6.  Cash is medical cleared pending labs, Chest x-ray and cardiac clearance \par \par Check CBC, BMP, PT/INR PTT, U/A, Blood type, and chest x-ray

## 2020-01-02 ENCOUNTER — APPOINTMENT (OUTPATIENT)
Dept: CARDIOLOGY | Facility: CLINIC | Age: 70
End: 2020-01-02
Payer: MEDICARE

## 2020-01-02 ENCOUNTER — NON-APPOINTMENT (OUTPATIENT)
Age: 70
End: 2020-01-02

## 2020-01-02 VITALS
SYSTOLIC BLOOD PRESSURE: 186 MMHG | DIASTOLIC BLOOD PRESSURE: 95 MMHG | WEIGHT: 202 LBS | HEART RATE: 81 BPM | HEIGHT: 68 IN | OXYGEN SATURATION: 95 % | BODY MASS INDEX: 30.62 KG/M2

## 2020-01-02 DIAGNOSIS — Z87.891 PERSONAL HISTORY OF NICOTINE DEPENDENCE: ICD-10-CM

## 2020-01-02 DIAGNOSIS — Z82.49 FAMILY HISTORY OF ISCHEMIC HEART DISEASE AND OTHER DISEASES OF THE CIRCULATORY SYSTEM: ICD-10-CM

## 2020-01-02 LAB
ABO + RH PNL BLD: NORMAL
ANION GAP SERPL CALC-SCNC: 15 MMOL/L
APPEARANCE: CLEAR
APTT BLD: 32.9 SEC
BASOPHILS # BLD AUTO: 0.05 K/UL
BASOPHILS NFR BLD AUTO: 0.6 %
BILIRUBIN URINE: NEGATIVE
BLOOD URINE: NEGATIVE
BUN SERPL-MCNC: 21 MG/DL
CALCIUM SERPL-MCNC: 9.9 MG/DL
CHLORIDE SERPL-SCNC: 102 MMOL/L
CO2 SERPL-SCNC: 24 MMOL/L
COLOR: NORMAL
CREAT SERPL-MCNC: 0.92 MG/DL
EOSINOPHIL # BLD AUTO: 0.45 K/UL
EOSINOPHIL NFR BLD AUTO: 5.5 %
GLUCOSE QUALITATIVE U: ABNORMAL
GLUCOSE SERPL-MCNC: 198 MG/DL
HCT VFR BLD CALC: 48.1 %
HGB BLD-MCNC: 14.7 G/DL
IMM GRANULOCYTES NFR BLD AUTO: 0.6 %
INR PPP: 0.93 RATIO
KETONES URINE: NEGATIVE
LEUKOCYTE ESTERASE URINE: NEGATIVE
LYMPHOCYTES # BLD AUTO: 2.26 K/UL
LYMPHOCYTES NFR BLD AUTO: 27.6 %
MAN DIFF?: NORMAL
MCHC RBC-ENTMCNC: 26.5 PG
MCHC RBC-ENTMCNC: 30.6 GM/DL
MCV RBC AUTO: 86.8 FL
MONOCYTES # BLD AUTO: 0.7 K/UL
MONOCYTES NFR BLD AUTO: 8.5 %
NEUTROPHILS # BLD AUTO: 4.68 K/UL
NEUTROPHILS NFR BLD AUTO: 57.2 %
NITRITE URINE: NEGATIVE
PH URINE: 5.5
PLATELET # BLD AUTO: 325 K/UL
POTASSIUM SERPL-SCNC: 4.6 MMOL/L
PROTEIN URINE: NEGATIVE
PT BLD: 10.5 SEC
RBC # BLD: 5.54 M/UL
RBC # FLD: 13.9 %
SODIUM SERPL-SCNC: 140 MMOL/L
SPECIFIC GRAVITY URINE: 1.02
UROBILINOGEN URINE: NORMAL
WBC # FLD AUTO: 8.19 K/UL

## 2020-01-02 PROCEDURE — 93015 CV STRESS TEST SUPVJ I&R: CPT

## 2020-01-02 PROCEDURE — 93000 ELECTROCARDIOGRAM COMPLETE: CPT | Mod: 59

## 2020-01-02 PROCEDURE — 99205 OFFICE O/P NEW HI 60 MIN: CPT

## 2020-01-02 NOTE — HISTORY OF PRESENT ILLNESS
[FreeTextEntry1] : I saw Cash Jackson in the office today for cardiac evaluation. He is a 69-year-old white male who has a history of a probable TIA approximately 5 months ago. He had transient numbness in his left arm.. Carotid Doppler showed significant disease. An MRA scan  showed a 90% plaque in the right coronary artery with mild plaque on the left side. He saw Dr Nance, a vascular surgeon and is scheduled for right carotid endarterectomy.\par \par The patient denies any history of hypertension. He stopped smoking approximately 10 years ago. he has hyperlipidemia. He has a family history of heart disease with his mother having a myocardial infarction at 70. He has recently diagnosed diabetes\par \par The patient has never had a cardiac evaluation. He is physically intact and has no chest pain or shortness of breath with physical exertion.\par \par A resting 12-lead cardiogram demonstrates sinus rhythm. There is nonspecific T-wave negativity

## 2020-01-02 NOTE — REVIEW OF SYSTEMS
[Eyeglasses] : currently wearing eyeglasses [Dizziness] : dizziness [Negative] : Gastrointestinal [Headache] : no headache [Recent Weight Gain (___ Lbs)] : no recent weight gain [Fever] : no fever [Feeling Fatigued] : not feeling fatigued [Chills] : no chills [Recent Weight Loss (___ Lbs)] : no recent weight loss [Blurry Vision] : no blurred vision [Seeing Double (Diplopia)] : no diplopia [Joint Pain] : no joint pain [Skin: A Rash] : no rash: [Depression] : no depression [Excessive Thirst] : no polydipsia [Anxiety] : no anxiety [Easy Bleeding] : no tendency for easy bleeding [Easy Bruising] : no tendency for easy bruising

## 2020-01-02 NOTE — DISCUSSION/SUMMARY
[FreeTextEntry1] : This is a 69-year-old white male who had a probable TIA about 5 months ago. Recent workup demonstrates a 90% lesion in the right internal carotid artery. He is being treated for hyperlipidemia and diabetes. Blood pressure is mildly elevated. He has no signs or symptoms of active heart disease.\par \par Today he underwent a stress test which showed no evidence of ischemia to a workload of 9 mets.\par \par The patient's cardiac status is stable and represents a satisfactory candidate for the planned right carotid endarterectomy. No special precautions other routine hemodynamic monitoring should be required.

## 2020-01-02 NOTE — PHYSICAL EXAM
[General Appearance - Well Developed] : well developed [Well Groomed] : well groomed [Normal Appearance] : normal appearance [General Appearance - Well Nourished] : well nourished [No Deformities] : no deformities [General Appearance - In No Acute Distress] : no acute distress [Normal Conjunctiva] : the conjunctiva exhibited no abnormalities [Normal Oral Mucosa] : normal oral mucosa [Normal Jugular Venous A Waves Present] : normal jugular venous A waves present [Normal Jugular Venous V Waves Present] : normal jugular venous V waves present [Normal Rate] : normal [No Jugular Venous Anthony A Waves] : no jugular venous anthony A waves [Normal S1] : normal S1 [Normal S2] : normal S2 [No Murmur] : no murmurs heard [2+] : left 2+ [1+] : left 1+ [No Abnormalities] : the abdominal aorta was not enlarged and no bruit was heard [No Pitting Edema] : no pitting edema present [Respiration, Rhythm And Depth] : normal respiratory rhythm and effort [Bowel Sounds] : normal bowel sounds [Exaggerated Use Of Accessory Muscles For Inspiration] : no accessory muscle use [Auscultation Breath Sounds / Voice Sounds] : lungs were clear to auscultation bilaterally [Abnormal Walk] : normal gait [Abdomen Soft] : soft [Abdomen Tenderness] : non-tender [Nail Clubbing] : no clubbing of the fingernails [Gait - Sufficient For Exercise Testing] : the gait was sufficient for exercise testing [Skin Turgor] : normal skin turgor [Cyanosis, Localized] : no localized cyanosis [Skin Color & Pigmentation] : normal skin color and pigmentation [Impaired Insight] : insight and judgment were intact [Oriented To Time, Place, And Person] : oriented to person, place, and time [No Anxiety] : not feeling anxious [] : no rash [S3] : no S3 [S4] : no S4 [Right Carotid Bruit] : no bruit heard over the right carotid [Left Carotid Bruit] : no bruit heard over the left carotid [Right Femoral Bruit] : no bruit heard over the right femoral artery [Left Femoral Bruit] : no bruit heard over the left femoral artery

## 2020-01-02 NOTE — REASON FOR VISIT
[Initial Evaluation] : an initial evaluation of [Carotid Artery Stenosis] : carotid stenosis [Hyperlipidemia] : hyperlipidemia [Abnormal ECG] : an abnormal ECG [FreeTextEntry1] : Diabetes, TIA

## 2020-01-03 ENCOUNTER — OUTPATIENT (OUTPATIENT)
Dept: OUTPATIENT SERVICES | Facility: HOSPITAL | Age: 70
LOS: 1 days | End: 2020-01-03
Payer: MEDICARE

## 2020-01-03 VITALS
OXYGEN SATURATION: 96 % | RESPIRATION RATE: 18 BRPM | DIASTOLIC BLOOD PRESSURE: 80 MMHG | SYSTOLIC BLOOD PRESSURE: 140 MMHG | WEIGHT: 199.96 LBS | TEMPERATURE: 99 F | HEIGHT: 68 IN | HEART RATE: 68 BPM

## 2020-01-03 DIAGNOSIS — E78.5 HYPERLIPIDEMIA, UNSPECIFIED: ICD-10-CM

## 2020-01-03 DIAGNOSIS — Z98.890 OTHER SPECIFIED POSTPROCEDURAL STATES: Chronic | ICD-10-CM

## 2020-01-03 DIAGNOSIS — E11.9 TYPE 2 DIABETES MELLITUS WITHOUT COMPLICATIONS: ICD-10-CM

## 2020-01-03 DIAGNOSIS — I65.21 OCCLUSION AND STENOSIS OF RIGHT CAROTID ARTERY: ICD-10-CM

## 2020-01-03 DIAGNOSIS — Z01.818 ENCOUNTER FOR OTHER PREPROCEDURAL EXAMINATION: ICD-10-CM

## 2020-01-03 LAB
BLD GP AB SCN SERPL QL: SIGNIFICANT CHANGE UP
MRSA PCR RESULT.: SIGNIFICANT CHANGE UP
S AUREUS DNA NOSE QL NAA+PROBE: SIGNIFICANT CHANGE UP

## 2020-01-03 PROCEDURE — G0463: CPT

## 2020-01-03 NOTE — H&P PST ADULT - HISTORY OF PRESENT ILLNESS
This is a 70 y/o male This is a 68 y/o male with probable TIA 7/2019 , carotid doppler and MRA revealed + right carotid stenosis, he presents today for  right carotid endarterectomy with EEG monitoring 1/9/2020 This is a 70 y/o male with probable TIA 7/2019 , carotid doppler and MRA revealed + right carotid stenosis, he presents today for  right carotid endarterectomy with EEG monitoring 1/9/2020  ****.Advised pt to continue ASA pre op ****

## 2020-01-03 NOTE — H&P PST ADULT - NSICDXPROBLEM_GEN_ALL_CORE_FT
PROBLEM DIAGNOSES  Problem: Occlusion and stenosis of right carotid artery  Assessment and Plan: right carotid endarterectomy with EEG monitoring    Problem: Diabetes mellitus  Assessment and Plan: finger stick on admit    Problem: Hyperlipidemia  Assessment and Plan: continue med

## 2020-01-03 NOTE — H&P PST ADULT - NSICDXFAMILYHX_GEN_ALL_CORE_FT
FAMILY HISTORY:  Family history of emphysema  Family history of heart disease  FH: diabetes mellitus

## 2020-01-03 NOTE — H&P PST ADULT - NSICDXPASTSURGICALHX_GEN_ALL_CORE_FT
PAST SURGICAL HISTORY:  History of hydrocelectomy right    S/P cystoscopy transurethral resection of bladder tumor x 2    S/P hernia repair bilateral inguinal hernia repair

## 2020-01-03 NOTE — H&P PST ADULT - NSICDXPASTMEDICALHX_GEN_ALL_CORE_FT
PAST MEDICAL HISTORY:  Diabetes mellitus     Hyperlipidemia PAST MEDICAL HISTORY:  Bladder tumor     Diabetes mellitus     Hyperlipidemia     TIA (transient ischemic attack) 7/2019

## 2020-01-06 ENCOUNTER — APPOINTMENT (OUTPATIENT)
Dept: PULMONOLOGY | Facility: CLINIC | Age: 70
End: 2020-01-06
Payer: MEDICARE

## 2020-01-06 VITALS
OXYGEN SATURATION: 96 % | DIASTOLIC BLOOD PRESSURE: 78 MMHG | WEIGHT: 202 LBS | SYSTOLIC BLOOD PRESSURE: 188 MMHG | BODY MASS INDEX: 30.62 KG/M2 | HEIGHT: 68 IN | HEART RATE: 76 BPM

## 2020-01-06 PROCEDURE — 94727 GAS DIL/WSHOT DETER LNG VOL: CPT | Mod: PD

## 2020-01-06 PROCEDURE — 94729 DIFFUSING CAPACITY: CPT | Mod: PD

## 2020-01-06 PROCEDURE — 94060 EVALUATION OF WHEEZING: CPT

## 2020-01-06 PROCEDURE — 99205 OFFICE O/P NEW HI 60 MIN: CPT | Mod: 25

## 2020-01-06 NOTE — REASON FOR VISIT
[Consultation] : a consultation visit [Abnormal CT Scan] : abnormal CT Scan [FreeTextEntry2] : pulmonary clearance

## 2020-01-06 NOTE — ASSESSMENT
[FreeTextEntry1] : Pulmonary mass concerning for malignancy, no evidence of spread within the chest by report, no lymphadenopathy.  Discussed with patient and daughter that this needs to be addressed ASAP.  It does not however have to hold up CEA surgery.  Pulmonary function is within normal limits.  No absolute pulmonary contraindication to proceed with CEA surgery at this time. \par \par Suggest PET scan ASAP followed by likely thoracic surgery consultation.

## 2020-01-06 NOTE — PHYSICAL EXAM
[Well Groomed] : well groomed [General Appearance - In No Acute Distress] : no acute distress [Neck Appearance] : the appearance of the neck was normal [Heart Sounds] : normal S1 and S2 [Respiration, Rhythm And Depth] : normal respiratory rhythm and effort [Exaggerated Use Of Accessory Muscles For Inspiration] : no accessory muscle use [Auscultation Breath Sounds / Voice Sounds] : lungs were clear to auscultation bilaterally [Nail Clubbing] : no clubbing of the fingernails [Cyanosis, Localized] : no localized cyanosis [] : the neck was supple [Neck Cervical Mass (___cm)] : no neck mass was observed

## 2020-01-06 NOTE — HISTORY OF PRESENT ILLNESS
[FreeTextEntry1] : 69M here for pulmonary clearance prior to CEA on 1/9/20.  Had abnormal pre op cxr, sent for CT chest which found 3cm LLL pulmonary mass.  Patient is currently without respiratory complaints.  Denies JAMES, cough, chest pain, sputum or hemoptysis.  Weight has been stable.  No night sweats/fever/chills.\par \par Former smoker quit 10 years ago, 1ppd x 40 yrs\par retired, [employer].

## 2020-01-06 NOTE — PROCEDURE
[FreeTextEntry1] : CT chest report reviewed [facility] 1/2/20\par 3x2x2.3 cm left lower lung mass with pleural attachments, mostly solid with some ground glass elements, mild peripheral fibrotic changes.\par \par PFT: Normal spirometry without a significant bronchodilator response.  Lung volumes normal. DLCO normal.\par

## 2020-01-06 NOTE — CONSULT LETTER
[FreeTextEntry1] : Dear ,\par \par I had the pleasure of evaluating your patient, ANABELLE MASON today in pulmonary consultation.  Please refer to my attached note for my findings and recommendations.\par \par \par Thank you for allowing me to participate in the care of your patient, please feel free to call with any questions or concerns.\par \par \par Sincerely,\par \par Juanita Mcmahon MD\par [facility] \par [facility]\par \par

## 2020-01-07 ENCOUNTER — TRANSCRIPTION ENCOUNTER (OUTPATIENT)
Age: 70
End: 2020-01-07

## 2020-01-07 PROBLEM — E11.9 TYPE 2 DIABETES MELLITUS WITHOUT COMPLICATIONS: Chronic | Status: ACTIVE | Noted: 2020-01-03

## 2020-01-07 PROBLEM — E78.5 HYPERLIPIDEMIA, UNSPECIFIED: Chronic | Status: ACTIVE | Noted: 2020-01-03

## 2020-01-07 PROBLEM — G45.9 TRANSIENT CEREBRAL ISCHEMIC ATTACK, UNSPECIFIED: Chronic | Status: ACTIVE | Noted: 2020-01-03

## 2020-01-08 ENCOUNTER — TRANSCRIPTION ENCOUNTER (OUTPATIENT)
Age: 70
End: 2020-01-08

## 2020-01-08 ENCOUNTER — INPATIENT (INPATIENT)
Facility: HOSPITAL | Age: 70
LOS: 0 days | Discharge: HOME | DRG: 39 | End: 2020-01-09
Attending: SURGERY | Admitting: SURGERY
Payer: MEDICARE

## 2020-01-08 ENCOUNTER — RESULT REVIEW (OUTPATIENT)
Age: 70
End: 2020-01-08

## 2020-01-08 ENCOUNTER — APPOINTMENT (OUTPATIENT)
Dept: VASCULAR SURGERY | Facility: HOSPITAL | Age: 70
End: 2020-01-08
Payer: MEDICARE

## 2020-01-08 VITALS
SYSTOLIC BLOOD PRESSURE: 171 MMHG | WEIGHT: 199.96 LBS | HEART RATE: 86 BPM | DIASTOLIC BLOOD PRESSURE: 88 MMHG | TEMPERATURE: 98 F | HEIGHT: 68 IN | RESPIRATION RATE: 20 BRPM | OXYGEN SATURATION: 100 %

## 2020-01-08 DIAGNOSIS — Z98.890 OTHER SPECIFIED POSTPROCEDURAL STATES: Chronic | ICD-10-CM

## 2020-01-08 DIAGNOSIS — I65.21 OCCLUSION AND STENOSIS OF RIGHT CAROTID ARTERY: ICD-10-CM

## 2020-01-08 LAB
BLD GP AB SCN SERPL QL: NEGATIVE — SIGNIFICANT CHANGE UP
GAS PNL BLDA: SIGNIFICANT CHANGE UP
GLUCOSE BLDC GLUCOMTR-MCNC: 108 MG/DL — HIGH (ref 70–99)
GLUCOSE BLDC GLUCOMTR-MCNC: 122 MG/DL — HIGH (ref 70–99)
RH IG SCN BLD-IMP: NEGATIVE — SIGNIFICANT CHANGE UP
RH IG SCN BLD-IMP: NEGATIVE — SIGNIFICANT CHANGE UP

## 2020-01-08 PROCEDURE — 35301 RECHANNELING OF ARTERY: CPT

## 2020-01-08 PROCEDURE — 88304 TISSUE EXAM BY PATHOLOGIST: CPT | Mod: 26

## 2020-01-08 PROCEDURE — 88311 DECALCIFY TISSUE: CPT | Mod: 26

## 2020-01-08 RX ORDER — SODIUM CHLORIDE 9 MG/ML
3 INJECTION INTRAMUSCULAR; INTRAVENOUS; SUBCUTANEOUS EVERY 8 HOURS
Refills: 0 | Status: DISCONTINUED | OUTPATIENT
Start: 2020-01-08 | End: 2020-01-08

## 2020-01-08 RX ORDER — FAMOTIDINE 10 MG/ML
20 INJECTION INTRAVENOUS ONCE
Refills: 0 | Status: COMPLETED | OUTPATIENT
Start: 2020-01-08 | End: 2020-01-08

## 2020-01-08 RX ORDER — FENTANYL CITRATE 50 UG/ML
50 INJECTION INTRAVENOUS
Refills: 0 | Status: DISCONTINUED | OUTPATIENT
Start: 2020-01-08 | End: 2020-01-09

## 2020-01-08 RX ORDER — ONDANSETRON 8 MG/1
4 TABLET, FILM COATED ORAL ONCE
Refills: 0 | Status: COMPLETED | OUTPATIENT
Start: 2020-01-08 | End: 2020-01-08

## 2020-01-08 RX ORDER — CHLORHEXIDINE GLUCONATE 213 G/1000ML
1 SOLUTION TOPICAL ONCE
Refills: 0 | Status: DISCONTINUED | OUTPATIENT
Start: 2020-01-08 | End: 2020-01-08

## 2020-01-08 RX ORDER — DEXTROSE MONOHYDRATE, SODIUM CHLORIDE, AND POTASSIUM CHLORIDE 50; .745; 4.5 G/1000ML; G/1000ML; G/1000ML
1000 INJECTION, SOLUTION INTRAVENOUS
Refills: 0 | Status: DISCONTINUED | OUTPATIENT
Start: 2020-01-08 | End: 2020-01-09

## 2020-01-08 RX ORDER — ASPIRIN/CALCIUM CARB/MAGNESIUM 324 MG
81 TABLET ORAL DAILY
Refills: 0 | Status: DISCONTINUED | OUTPATIENT
Start: 2020-01-08 | End: 2020-01-08

## 2020-01-08 RX ORDER — ASPIRIN/CALCIUM CARB/MAGNESIUM 324 MG
81 TABLET ORAL DAILY
Refills: 0 | Status: DISCONTINUED | OUTPATIENT
Start: 2020-01-08 | End: 2020-01-09

## 2020-01-08 RX ORDER — HEPARIN SODIUM 5000 [USP'U]/ML
5000 INJECTION INTRAVENOUS; SUBCUTANEOUS EVERY 8 HOURS
Refills: 0 | Status: DISCONTINUED | OUTPATIENT
Start: 2020-01-08 | End: 2020-01-09

## 2020-01-08 RX ORDER — ACETAMINOPHEN 500 MG
1000 TABLET ORAL ONCE
Refills: 0 | Status: DISCONTINUED | OUTPATIENT
Start: 2020-01-08 | End: 2020-01-08

## 2020-01-08 RX ORDER — ATORVASTATIN CALCIUM 80 MG/1
20 TABLET, FILM COATED ORAL AT BEDTIME
Refills: 0 | Status: DISCONTINUED | OUTPATIENT
Start: 2020-01-08 | End: 2020-01-09

## 2020-01-08 RX ORDER — DIPHENHYDRAMINE HCL 50 MG
12.5 CAPSULE ORAL ONCE
Refills: 0 | Status: COMPLETED | OUTPATIENT
Start: 2020-01-08 | End: 2020-01-08

## 2020-01-08 RX ORDER — ACETAMINOPHEN 500 MG
1000 TABLET ORAL ONCE
Refills: 0 | Status: COMPLETED | OUTPATIENT
Start: 2020-01-08 | End: 2020-01-08

## 2020-01-08 RX ORDER — IBUPROFEN 200 MG
400 TABLET ORAL ONCE
Refills: 0 | Status: COMPLETED | OUTPATIENT
Start: 2020-01-08 | End: 2020-01-09

## 2020-01-08 RX ADMIN — ATORVASTATIN CALCIUM 20 MILLIGRAM(S): 80 TABLET, FILM COATED ORAL at 22:43

## 2020-01-08 RX ADMIN — DEXTROSE MONOHYDRATE, SODIUM CHLORIDE, AND POTASSIUM CHLORIDE 75 MILLILITER(S): 50; .745; 4.5 INJECTION, SOLUTION INTRAVENOUS at 19:15

## 2020-01-08 RX ADMIN — FENTANYL CITRATE 50 MICROGRAM(S): 50 INJECTION INTRAVENOUS at 20:47

## 2020-01-08 RX ADMIN — Medication 12.5 MILLIGRAM(S): at 20:06

## 2020-01-08 RX ADMIN — HEPARIN SODIUM 5000 UNIT(S): 5000 INJECTION INTRAVENOUS; SUBCUTANEOUS at 22:43

## 2020-01-08 RX ADMIN — ONDANSETRON 4 MILLIGRAM(S): 8 TABLET, FILM COATED ORAL at 19:38

## 2020-01-08 RX ADMIN — FENTANYL CITRATE 50 MICROGRAM(S): 50 INJECTION INTRAVENOUS at 18:52

## 2020-01-08 RX ADMIN — FENTANYL CITRATE 50 MICROGRAM(S): 50 INJECTION INTRAVENOUS at 20:17

## 2020-01-08 RX ADMIN — FENTANYL CITRATE 50 MICROGRAM(S): 50 INJECTION INTRAVENOUS at 19:11

## 2020-01-08 RX ADMIN — Medication 400 MILLIGRAM(S): at 22:43

## 2020-01-08 RX ADMIN — FAMOTIDINE 20 MILLIGRAM(S): 10 INJECTION INTRAVENOUS at 20:06

## 2020-01-08 RX ADMIN — FENTANYL CITRATE 50 MICROGRAM(S): 50 INJECTION INTRAVENOUS at 18:55

## 2020-01-08 RX ADMIN — FENTANYL CITRATE 50 MICROGRAM(S): 50 INJECTION INTRAVENOUS at 19:12

## 2020-01-08 NOTE — PRE-ANESTHESIA EVALUATION ADULT - NSANTHPMHFT_GEN_ALL_CORE
69M HLD, DM2, TIA (7/19), Bladder tumor w/ right carotid stenosis  Cardiology consult note reviewed (Dr. Lopez)  - Stress test 1/2/20: No evidence of ischemia

## 2020-01-08 NOTE — PATIENT PROFILE ADULT - OVER THE PAST TWO WEEKS HAVE YOU FELT DOWN, DEPRESSED OR HOPELESS?
Referred by: Fabio Villanueva MD; Medical Diagnosis (from order):    Diagnosis Information      Diagnosis    719.41 (ICD-9-CM) - M25.511 (ICD-10-CM) - Right shoulder pain                Physical Therapy -  Daily Treatment Note    Visit:  3     SUBJECTIVE                                                                                                             Medicare = 4/10    11/26/19 Right shoulder - was walking and missed a step. Started to fall and grabbed a railing to stop his fall. Xray was negative for fracture but not healing as he would like. Did a 1 week pass at the  and tried to do UE workouts, but had pain. Has OA in his neck/back as well. MD dx with biceps tendonitis. R handed for ADLS   12/3/19 No worse with HEP. Still hard to sleep due to pain. Pain radiates down to his bicep area and tightness in the am. Tends to get tense at night in other areas as well as the shoulder.  12/5/19 Some soreness in his shoulder to bicep area and his low back was also after last visit.   12/10/19 More sore after laying on his side last night. Overall shoulder pain is about the same as when he started PT, but does feel a little looser after doing his HEP.         OBJECTIVE                                                                                                                                      TREATMENT                                                                                                                  Therapeutic Exercise:  Pulley flexion and scaption x 0  Cane extension x0 tightness B  Cane flexion in standing  x10  Cane scaption x10  Cane ER - x 0soreness in his back before shoulder is tight   Standing wall slides shoulder flexion x0  Rows x 10 red  B shoulder ext x 10 red  tband shoulder add x 10 red  tband shoulder ER x 0 yellow - afterwards increase in LBP  tband shoulder IR x 0 yellow afterwards increase in LBP  PROM R shoulder x10  Punches x 10 yellow  Isometric ER  Isometric IR x10 5  sec  Ultrasound ([id number])    Location: R shoulder  Position: sitting  Duty Cycle: 50%  Intensity (w/cm2): 1.0  Duration: 10 minutes  Reaction: no adverse reaction to treatment    Skilled input: verbal instruction/cues    Home Exercise Program: (*above indicates provided as part of home exercise program)  See HEP sheet in paper chart      ASSESSMENT                                                                                                                 11/26/19 Pt also has neck and back OA which may affect progression of exercise  12/3/19 Soreness in his low back after starting some RTC strengthening.  12/5/19 Had to modify exercises due to LB issues with any rotation motions like ER/IR  12/9/19 Added isometric shoulder IR and will see how his back feels. Tolerated cane exercises  Patient Education:   Results of above outlined education: Verbalizes understanding and Demonstrates understanding    GOALS                                                                                                                       Long Term Goals: To be met by end of plan of care:      Home Exercise Program: Independent with progressed and modified home exercise program (HEP)      Sleep: Sleep at prior level of function per patient report     Patient Reported Outcome Measure: Improvement in function /disability/impairment as indicated by Quick DASH (minimal clinically important difference = 15.91) < or =       Procedures and total treatment time documented Time Entry flowsheet.     no

## 2020-01-09 ENCOUNTER — APPOINTMENT (OUTPATIENT)
Dept: VASCULAR SURGERY | Facility: HOSPITAL | Age: 70
End: 2020-01-09

## 2020-01-09 ENCOUNTER — TRANSCRIPTION ENCOUNTER (OUTPATIENT)
Age: 70
End: 2020-01-09

## 2020-01-09 VITALS
TEMPERATURE: 98 F | OXYGEN SATURATION: 94 % | SYSTOLIC BLOOD PRESSURE: 144 MMHG | DIASTOLIC BLOOD PRESSURE: 69 MMHG | RESPIRATION RATE: 17 BRPM | HEART RATE: 72 BPM

## 2020-01-09 LAB
ANION GAP SERPL CALC-SCNC: 13 MMOL/L — SIGNIFICANT CHANGE UP (ref 5–17)
BUN SERPL-MCNC: 15 MG/DL — SIGNIFICANT CHANGE UP (ref 7–23)
CALCIUM SERPL-MCNC: 9.3 MG/DL — SIGNIFICANT CHANGE UP (ref 8.4–10.5)
CHLORIDE SERPL-SCNC: 100 MMOL/L — SIGNIFICANT CHANGE UP (ref 96–108)
CHOLEST SERPL-MCNC: 174 MG/DL — SIGNIFICANT CHANGE UP (ref 10–199)
CO2 SERPL-SCNC: 22 MMOL/L — SIGNIFICANT CHANGE UP (ref 22–31)
CREAT SERPL-MCNC: 0.94 MG/DL — SIGNIFICANT CHANGE UP (ref 0.5–1.3)
GLUCOSE BLDC GLUCOMTR-MCNC: 123 MG/DL — HIGH (ref 70–99)
GLUCOSE BLDC GLUCOMTR-MCNC: 125 MG/DL — HIGH (ref 70–99)
GLUCOSE SERPL-MCNC: 181 MG/DL — HIGH (ref 70–99)
HBA1C BLD-MCNC: 6.6 % — HIGH (ref 4–5.6)
HCT VFR BLD CALC: 40.4 % — SIGNIFICANT CHANGE UP (ref 39–50)
HDLC SERPL-MCNC: 27 MG/DL — LOW
HGB BLD-MCNC: 12.8 G/DL — LOW (ref 13–17)
LIPID PNL WITH DIRECT LDL SERPL: 115 MG/DL — HIGH
MCHC RBC-ENTMCNC: 26.7 PG — LOW (ref 27–34)
MCHC RBC-ENTMCNC: 31.7 GM/DL — LOW (ref 32–36)
MCV RBC AUTO: 84.3 FL — SIGNIFICANT CHANGE UP (ref 80–100)
NRBC # BLD: 0 /100 WBCS — SIGNIFICANT CHANGE UP (ref 0–0)
PLATELET # BLD AUTO: 302 K/UL — SIGNIFICANT CHANGE UP (ref 150–400)
POTASSIUM SERPL-MCNC: 4.5 MMOL/L — SIGNIFICANT CHANGE UP (ref 3.5–5.3)
POTASSIUM SERPL-SCNC: 4.5 MMOL/L — SIGNIFICANT CHANGE UP (ref 3.5–5.3)
RBC # BLD: 4.79 M/UL — SIGNIFICANT CHANGE UP (ref 4.2–5.8)
RBC # FLD: 13.5 % — SIGNIFICANT CHANGE UP (ref 10.3–14.5)
SODIUM SERPL-SCNC: 135 MMOL/L — SIGNIFICANT CHANGE UP (ref 135–145)
TOTAL CHOLESTEROL/HDL RATIO MEASUREMENT: 6.4 RATIO — SIGNIFICANT CHANGE UP (ref 3.4–9.6)
TRIGL SERPL-MCNC: 160 MG/DL — HIGH (ref 10–149)
WBC # BLD: 10.49 K/UL — SIGNIFICANT CHANGE UP (ref 3.8–10.5)
WBC # FLD AUTO: 10.49 K/UL — SIGNIFICANT CHANGE UP (ref 3.8–10.5)

## 2020-01-09 PROCEDURE — 88311 DECALCIFY TISSUE: CPT

## 2020-01-09 PROCEDURE — 86901 BLOOD TYPING SEROLOGIC RH(D): CPT

## 2020-01-09 PROCEDURE — 84295 ASSAY OF SERUM SODIUM: CPT

## 2020-01-09 PROCEDURE — 82435 ASSAY OF BLOOD CHLORIDE: CPT

## 2020-01-09 PROCEDURE — 80048 BASIC METABOLIC PNL TOTAL CA: CPT

## 2020-01-09 PROCEDURE — 82565 ASSAY OF CREATININE: CPT

## 2020-01-09 PROCEDURE — 88304 TISSUE EXAM BY PATHOLOGIST: CPT

## 2020-01-09 PROCEDURE — 83605 ASSAY OF LACTIC ACID: CPT

## 2020-01-09 PROCEDURE — 86900 BLOOD TYPING SEROLOGIC ABO: CPT

## 2020-01-09 PROCEDURE — 83036 HEMOGLOBIN GLYCOSYLATED A1C: CPT

## 2020-01-09 PROCEDURE — 84132 ASSAY OF SERUM POTASSIUM: CPT

## 2020-01-09 PROCEDURE — 85027 COMPLETE CBC AUTOMATED: CPT

## 2020-01-09 PROCEDURE — 86850 RBC ANTIBODY SCREEN: CPT

## 2020-01-09 PROCEDURE — 85014 HEMATOCRIT: CPT

## 2020-01-09 PROCEDURE — 82947 ASSAY GLUCOSE BLOOD QUANT: CPT

## 2020-01-09 PROCEDURE — C1889: CPT

## 2020-01-09 PROCEDURE — 82803 BLOOD GASES ANY COMBINATION: CPT

## 2020-01-09 PROCEDURE — C1768: CPT

## 2020-01-09 PROCEDURE — 80061 LIPID PANEL: CPT

## 2020-01-09 PROCEDURE — 82962 GLUCOSE BLOOD TEST: CPT

## 2020-01-09 PROCEDURE — 82330 ASSAY OF CALCIUM: CPT

## 2020-01-09 RX ORDER — DEXTROSE 50 % IN WATER 50 %
12.5 SYRINGE (ML) INTRAVENOUS ONCE
Refills: 0 | Status: DISCONTINUED | OUTPATIENT
Start: 2020-01-09 | End: 2020-01-09

## 2020-01-09 RX ORDER — DEXTROSE 50 % IN WATER 50 %
25 SYRINGE (ML) INTRAVENOUS ONCE
Refills: 0 | Status: DISCONTINUED | OUTPATIENT
Start: 2020-01-09 | End: 2020-01-09

## 2020-01-09 RX ORDER — SODIUM CHLORIDE 9 MG/ML
1000 INJECTION, SOLUTION INTRAVENOUS
Refills: 0 | Status: DISCONTINUED | OUTPATIENT
Start: 2020-01-09 | End: 2020-01-09

## 2020-01-09 RX ORDER — GLUCAGON INJECTION, SOLUTION 0.5 MG/.1ML
1 INJECTION, SOLUTION SUBCUTANEOUS ONCE
Refills: 0 | Status: DISCONTINUED | OUTPATIENT
Start: 2020-01-09 | End: 2020-01-09

## 2020-01-09 RX ORDER — INSULIN LISPRO 100/ML
VIAL (ML) SUBCUTANEOUS
Refills: 0 | Status: DISCONTINUED | OUTPATIENT
Start: 2020-01-09 | End: 2020-01-09

## 2020-01-09 RX ORDER — INSULIN LISPRO 100/ML
VIAL (ML) SUBCUTANEOUS AT BEDTIME
Refills: 0 | Status: DISCONTINUED | OUTPATIENT
Start: 2020-01-09 | End: 2020-01-09

## 2020-01-09 RX ORDER — DEXTROSE 50 % IN WATER 50 %
15 SYRINGE (ML) INTRAVENOUS ONCE
Refills: 0 | Status: DISCONTINUED | OUTPATIENT
Start: 2020-01-09 | End: 2020-01-09

## 2020-01-09 RX ADMIN — HEPARIN SODIUM 5000 UNIT(S): 5000 INJECTION INTRAVENOUS; SUBCUTANEOUS at 05:06

## 2020-01-09 RX ADMIN — Medication 81 MILLIGRAM(S): at 12:57

## 2020-01-09 RX ADMIN — Medication 400 MILLIGRAM(S): at 05:06

## 2020-01-09 RX ADMIN — HEPARIN SODIUM 5000 UNIT(S): 5000 INJECTION INTRAVENOUS; SUBCUTANEOUS at 12:58

## 2020-01-09 RX ADMIN — Medication 400 MILLIGRAM(S): at 05:54

## 2020-01-09 NOTE — CHART NOTE - NSCHARTNOTEFT_GEN_A_CORE
[id number]  ANABELLE MASON is a 69y male s/p Right CEA with dacron patch.             Subjective: He is feeling well but sore in the right side of his neck. He was nauseous and reported a headache upon exit from the OR. Both have since resolved with tylenol, benadryl, pepcid, zofran, and motrin. Now he states that he has no complaints or issues.     Objective:  T(C): 36.2 (01-08-20 @ 20:30), Max: 36.5 (01-08-20 @ 12:31)  HR: 75 (01-08-20 @ 23:00)  BP: 112/55 (01-08-20 @ 23:00)  RR: 15 (01-08-20 @ 23:00)  SpO2: 96% (01-08-20 @ 23:00)    Physical Exam:  GENERAL: A&Ox4, in NAD  HEENT: Normocephalic, atraumatic  NECK: Right neck dressed c/d/i, BERNICE in place with SS output.   CARDIO: RRR  RESP: CTAB  GI: NTND  EXT: BL UE 5/5 strength, sensorum intact, palp radial and ulnar pulses  NEURO: CNII-XII intact, decreased sensation about right mandible    aspirin enteric coated 81 milliGRAM(s) Oral daily  atorvastatin 20 milliGRAM(s) Oral at bedtime  dextrose 5% + sodium chloride 0.45% with potassium chloride 20 mEq/L 1000 milliLiter(s) IV Continuous <Continuous>  fentaNYL    Injectable 50 MICROGram(s) IV Push every 10 minutes PRN  heparin  Injectable 5000 Unit(s) SubCutaneous every 8 hours  ibuprofen  Tablet. 400 milliGRAM(s) Oral once      Assessment: Patient is s/p Right CEA with dacron patch hours ago, healing appropriately and ready to transfer to floor.    Pain Control Adequate  Diet: ADAT   DVT Prophylaxis: SQH   Transfer to Surgical Floor  Out of Bed and encourage early ambulation  Incentive Spirometry      Vascular Surgery [phone]    Carlton Cooper M.D.   PGY1 - General Surgery   [facility]  Pager: [phone]

## 2020-01-09 NOTE — DISCHARGE NOTE PROVIDER - CARE PROVIDER_API CALL
Samir Nance)  Vascular Surgery  [address], [address]  [place] [address]  Phone: [phone]  Fax: [phone]  Follow Up Time: 2 weeks

## 2020-01-09 NOTE — DISCHARGE NOTE NURSING/CASE MANAGEMENT/SOCIAL WORK - PATIENT PORTAL LINK FT
You can access the FollowMyHealth Patient Portal offered by [facility] by registering at the following website: [email or web address]. By joining [employer]’s FollowMyHealth portal, you will also be able to view your health information using other applications (apps) compatible with our system.

## 2020-01-09 NOTE — DISCHARGE NOTE PROVIDER - NSDCFUSCHEDAPPT_GEN_ALL_CORE_FT
ANABELLE MASON ; 01/21/2020 ; NPP Surg Vasc [address] ANA[address]EL[address] ; 01/23/2020 ; NPP Rad Nucmed 100 Opd ANABELLE Vale ; 01/23/2020 ; NPP Rad Nucmed [address] [place]

## 2020-01-09 NOTE — PROGRESS NOTE ADULT - ASSESSMENT
70yo Male non-smoker, with hx of DM, HLD, now s/p R CEA 1/8/2020 recovering appropriately.     - d/c pugh  - Pain Control Adequate  - Diet: Regular   - DVT Prophylaxis: SQH   - Out of Bed and encourage early ambulation  - Incentive Spirometry  - Dispo Planning      Vascular Surgery [phone]

## 2020-01-09 NOTE — PROGRESS NOTE ADULT - SUBJECTIVE AND OBJECTIVE BOX
Vascular Surgery Progress Note     Subjective/24hour Events: No acute events overnight. Patient states that he feels well save for some soreness of the R neck.     Vital Signs:  Vital Signs Last 24 Hrs  T(C): 36.7 (09 Jan 2020 01:50), Max: 36.8 (09 Jan 2020 00:47)  T(F): 98.1 (09 Jan 2020 01:50), Max: 98.3 (09 Jan 2020 00:47)  HR: 77 (09 Jan 2020 01:50) (64 - 86)  BP: 129/67 (09 Jan 2020 01:50) (112/55 - 171/88)  BP(mean): 82 (09 Jan 2020 00:30) (79 - 106)  RR: 18 (09 Jan 2020 01:50) (14 - 20)  SpO2: 91% (09 Jan 2020 01:50) (91% - 100%)        I&O's Detail    08 Jan 2020 07:01  -  09 Jan 2020 02:29  --------------------------------------------------------  IN:    dextrose 5% + sodium chloride 0.45% with potassium chloride 20 mEq/L: 525 mL    IV PiggyBack: 100 mL    Oral Fluid: 350 mL  Total IN: 975 mL    OUT:    Bulb: 45 mL    Indwelling Catheter - Urethral: 675 mL  Total OUT: 720 mL    Total NET: 255 mL            MEDICATIONS  (STANDING):  aspirin enteric coated 81 milliGRAM(s) Oral daily  atorvastatin 20 milliGRAM(s) Oral at bedtime  dextrose 5% + sodium chloride 0.45% with potassium chloride 20 mEq/L 1000 milliLiter(s) (75 mL/Hr) IV Continuous <Continuous>  heparin  Injectable 5000 Unit(s) SubCutaneous every 8 hours  ibuprofen  Tablet. 400 milliGRAM(s) Oral once    MEDICATIONS  (PRN):        Physical Exam:  Gen: well appearing male/female of stated age, in NAD  NECK: Right neck dressed c/d/i, BERNICE in place with SS output.   Resp: no increased work of breathing, no SOB, no wheezing, rales, or crackles, 2L o2 NC  Card: RRR, no Murmurs, Rubs, or Gallops  GI: soft, non-tender, non-distended  : pugh in place  EXT: BL UE 5/5 strength, sensorum intact, palp radial and ulnar pulses  NEURO: CNII-XII intact, decreased sensation about right mandible        CAPILLARY BLOOD GLUCOSE      POCT Blood Glucose.: 122 mg/dL (08 Jan 2020 18:00)  POCT Blood Glucose.: 108 mg/dL (08 Jan 2020 11:52)

## 2020-01-09 NOTE — DISCHARGE NOTE PROVIDER - NSDCMRMEDTOKEN_GEN_ALL_CORE_FT
Aspir 81 oral delayed release tablet: 1 tab(s) orally once a day (at bedtime)  atorvastatin 20 mg oral tablet: 1 tab(s) orally once a day (at bedtime)  fenofibrate 160 mg oral tablet: 1 tab(s) orally once a day (at bedtime)  metFORMIN 500 mg oral tablet: 1 tab(s) orally 2 times a day

## 2020-01-09 NOTE — DISCHARGE NOTE PROVIDER - NSDCACTIVITY_GEN_ALL_CORE
Stairs allowed/Showering allowed/Walking - Outdoors allowed/Walking - Indoors allowed/No heavy lifting/straining

## 2020-01-09 NOTE — DISCHARGE NOTE PROVIDER - HOSPITAL COURSE
This is a 70 y/o male with probable TIA 7/2019 , carotid doppler and MRA revealed + right carotid stenosis, he presents today for  right carotid endarterectomy with EEG monitoring 1/9/2020.        1/9/2020- Patient underwent Right carotid endarterectomy with dacron patch.  The patient tolerated the procedure well without complications, was extubated, and transferred to the PACU in stable condition. In the PACU, the patients' pain was controlled, vitals stable, .  The patient was transferred to the surgical floor in stable condition.         POD#1 Patients pugh discontinued, and was able to void freely. His BERNICE was also removed. On day of discharge, the patient was tolerating diet, ambulating well and pain controlled. Patient will be discharged home with outpatient follow up with Dr. Nance within 1-2 weeks. This is a 68 y/o male with probable TIA 7/2019 , carotid doppler and MRA revealed + right carotid stenosis, he presents today for right carotid endarterectomy.        1/9/2020- Patient underwent Right carotid endarterectomy with dacron patch.  The patient tolerated the procedure well without complications, was extubated, and transferred to the PACU in stable condition. In the PACU, the patients' pain was controlled, vitals stable, .  The patient was transferred to the surgical floor in stable condition.         POD#1 Patients pugh discontinued, and was able to void freely. His BERNICE was also removed. On day of discharge, the patient was tolerating diet, ambulating well and pain well controlled. Patient will be discharged home with outpatient follow up with Dr. Nance within 1-2 weeks.

## 2020-01-09 NOTE — DISCHARGE NOTE NURSING/CASE MANAGEMENT/SOCIAL WORK - NSDCPEPTSTRK_GEN_ALL_CORE
Prescribed medications/Stroke support groups for patients, families, and friends/Risk factors for stroke/Call 911 for stroke/Stroke education booklet/Need for follow up after discharge/Stroke warning signs and symptoms/Signs and symptoms of stroke

## 2020-01-09 NOTE — DISCHARGE NOTE PROVIDER - NSDCCPCAREPLAN_GEN_ALL_CORE_FT
PRINCIPAL DISCHARGE DIAGNOSIS  Diagnosis: Carotid stenosis, right  Assessment and Plan of Treatment: You may shower. Pat Dry abdomen. Leave the white steri strips in place, they will fall off on their own in approximately 5-7 days.     BATHING: Please do not submerge wound underwater. You may shower and/or sponge bathe.  ACTIVITY: No heavy lifting anything more than 10-15lbs or straining. Otherwise, you may return to your usual level of physical activity. If you are taking narcotic pain medication (such as Percocet), do NOT drive a car, operate machinery or make important decisions.  NOTIFY YOUR SURGEON IF: You have any bleeding that does not stop, any pus draining from your wound, any fever (over 100.4 F) or chills, persistent nausea/vomiting with inability to tolerate food or liquids, persistent diarrhea, or if your pain is not controlled on your discharge pain medications.  FOLLOW-UP:  1. Please call to make a follow-up appointment within one week of discharge   2. Please follow up with your primary care physician in one week regarding your hospitalization.

## 2020-01-13 ENCOUNTER — APPOINTMENT (OUTPATIENT)
Dept: INTERNAL MEDICINE | Facility: CLINIC | Age: 70
End: 2020-01-13
Payer: MEDICARE

## 2020-01-13 VITALS
DIASTOLIC BLOOD PRESSURE: 88 MMHG | WEIGHT: 201.19 LBS | HEIGHT: 68 IN | RESPIRATION RATE: 16 BRPM | BODY MASS INDEX: 30.49 KG/M2 | TEMPERATURE: 98.3 F | SYSTOLIC BLOOD PRESSURE: 160 MMHG | HEART RATE: 73 BPM | OXYGEN SATURATION: 97 %

## 2020-01-13 PROCEDURE — 99214 OFFICE O/P EST MOD 30 MIN: CPT

## 2020-01-13 PROCEDURE — 99496 TRANSJ CARE MGMT HIGH F2F 7D: CPT | Mod: 25

## 2020-01-14 LAB — SURGICAL PATHOLOGY STUDY: SIGNIFICANT CHANGE UP

## 2020-01-14 NOTE — PHYSICAL EXAM
[No Acute Distress] : no acute distress [Well Nourished] : well nourished [Well-Appearing] : well-appearing [Well Developed] : well developed [PERRL] : pupils equal round and reactive to light [EOMI] : extraocular movements intact [Normal Sclera/Conjunctiva] : normal sclera/conjunctiva [Normal Outer Ear/Nose] : the outer ears and nose were normal in appearance [No JVD] : no jugular venous distention [Normal Oropharynx] : the oropharynx was normal [No Lymphadenopathy] : no lymphadenopathy [Supple] : supple [Thyroid Normal, No Nodules] : the thyroid was normal and there were no nodules present [No Respiratory Distress] : no respiratory distress  [No Accessory Muscle Use] : no accessory muscle use [Clear to Auscultation] : lungs were clear to auscultation bilaterally [Normal Rate] : normal rate  [Normal S1, S2] : normal S1 and S2 [Regular Rhythm] : with a regular rhythm [No Murmur] : no murmur heard [No Abdominal Bruit] : a ~M bruit was not heard ~T in the abdomen [No Carotid Bruits] : no carotid bruits [Pedal Pulses Present] : the pedal pulses are present [No Varicosities] : no varicosities [No Edema] : there was no peripheral edema [No Extremity Clubbing/Cyanosis] : no extremity clubbing/cyanosis [Soft] : abdomen soft [No Palpable Aorta] : no palpable aorta [Non Tender] : non-tender [Non-distended] : non-distended [No Masses] : no abdominal mass palpated [No HSM] : no HSM [Normal Bowel Sounds] : normal bowel sounds [Normal Posterior Cervical Nodes] : no posterior cervical lymphadenopathy [Normal Anterior Cervical Nodes] : no anterior cervical lymphadenopathy [No CVA Tenderness] : no CVA  tenderness [No Joint Swelling] : no joint swelling [No Spinal Tenderness] : no spinal tenderness [Grossly Normal Strength/Tone] : grossly normal strength/tone [No Rash] : no rash [Coordination Grossly Intact] : coordination grossly intact [No Focal Deficits] : no focal deficits [Normal Gait] : normal gait [Deep Tendon Reflexes (DTR)] : deep tendon reflexes were 2+ and symmetric [Normal Affect] : the affect was normal [Normal Insight/Judgement] : insight and judgment were intact [Normal TMs] : both tympanic membranes were normal [Normal Mood] : the mood was normal [Speech Grossly Normal] : speech grossly normal [Alert and Oriented x3] : oriented to person, place, and time [de-identified] : Left bruit  [de-identified] : with murmur  2-3/6 [de-identified] : left bruit   right side of neck swollen .  Surgical incision looks clean and dry.   [de-identified] : obese

## 2020-01-14 NOTE — PLAN
[Patient/representative verbalized importance of medical follow up with PCP/other specialist after hospital discharge] : Patient/representative verbalized importance of medical follow up with PCP/other specialist after hospital discharge [Follow up appointment scheduled - Enter Date of Appointment:___] : Follow up appointment scheduled. Date of appointment: [unfilled] [Specialist(s) appointment needed] : Patient expressed need for specialist(s) [FreeTextEntry1] : Cardio - Carotid stenosis - s/p  CEA -  Patient is currently doing well.  Has mild elevation of his systolic blood pressure.  Advised to start Altace 5 mg and low na diet.  recheck BP in 10 days.  Monitor BP at home.  Follow up with vascular.  Advised side effects to the medication. Advised goal of LDL less than 70 \par Continue with statin therapy.  \par \par Pulm - Lung  Mass  - follow up with PET scan and pulmonology.  \par \par Endocrinology\par hyperglycemia - continue Metformin HCl 500 bid . - continue low carbohydrate/low sugar diet - check hemoglobin A1C and microalbumin in 2 months \par \par hyperlipidemia/hypertriglyceridemia - continue Fenofibrate 160mg p.o.q.d. and Lipitor ad directed - continue low cholesterol/low fat diet - check FLP in 2 months \par \par vitamin D insufficiency - continue vitamin D tablets p.o.q.d. - check vitamin D\par \par Immunization Flu and  Prevnar 13 - discussed benefits of receiving the vaccine in detail.  Once feeling better will RTO for the vax.  \par \par We discussed the importance of healthy lifestyles which include exercise, weight control and good diet.\par Patient's questions were answered in full detail. Advise patient if any other concerns arise to please call office to have a discussion.  [FreeTextEntry2] : Vascular Dr. Nance\par Pulmonology Dr. Mcmahon\par

## 2020-01-14 NOTE — HISTORY OF PRESENT ILLNESS
[Spouse] : spouse [de-identified] : Mr. MASON is a 69 year  male, who present to the office for follow up s/p Right  CEA.  States surgical procedure went well.  states he was discharge from the hospital the next day.  States over the last few days he has been getting headaches.  Called  surgeon who advised to follow up with PCP for a blood pressure check.  Patient states Headaches seemed to relieved with warm heating pad on the neck.  Also if he takes an Advil.  Was advised no NSAID when he was discharged.  Patient states while he was in the hospital his blood pressure was up and down.  \par Patient denies fever, chills, nausea, vomiting, loss of senses or memory loss.  \par \par Has an appointment next week for a PET scan to evaluate the lung mass \ellen Castrejon states his medication regimen did not change.   [FreeTextEntry1] : Follow up from CEA That was done by Dr. Nance on 01/08/2020 at [facility].  Patient was discharge to home on 1/8/2020.\par Patient was contacted by Elizabeth Stephenson RN and advised to RTO for a follow up.\par Patient dies not have any hospital paper work with him

## 2020-01-14 NOTE — DISCUSSION/SUMMARY
[FreeTextEntry1] : Hospital Course:\par Discharge Date 09-Jan-2020\par Admission Date 08-Jan-2020 11:48\par Reason for Admission CEA\par Hospital Course \par This is a 68 y/o male with probable TIA 7/2019 , carotid doppler and MRA\par revealed + right carotid stenosis, he presents today for right carotid\par endarterectomy.\par \par 1/9/2020- Patient underwent Right carotid endarterectomy with dacron patch.\par The patient tolerated the procedure well without complications, was extubated,\par and transferred to the PACU in stable condition. In the PACU, the patients'\par pain was controlled, vitals stable, . The patient was transferred to the\par surgical floor in stable condition.\par \par POD#1 Patients pugh discontinued, and was able to void freely. His BERNICE was also\par removed. On day of discharge, the patient was tolerating diet, ambulating well\par and pain well controlled. Patient will be discharged home with outpatient\par follow up with Dr. Nance within 1-2 weeks.\par \par \par Pt. s/p R. carotid endarterectomy. Spoke w/ pt.'s wife, Ayse, over the phone. She states patient is doing very well, with minimal pain. Pt. offers complaint of a DURHAM this morning. Asye states that the incision site is C/D/I, no abnormal discharge, swelling, or bruising. Site is secured w/ steri strips. Ayse denies any medication changes at this time. Pt. was not discharged with any new prescriptions. Pt. has a scheduled f/u in 2 weeks w/ Dr. Nance, surgeon. Ayse denies wanting to f/u in our office at this time. She was advised to call our office with any questions or concerns. Will update SHANELL Barkley.

## 2020-01-14 NOTE — DATA REVIEWED
[No studies available for review at this time.] : No studies available for review at this time. [FreeTextEntry1] : Reviewed Dr. Mcmahon note \par \par \par

## 2020-01-14 NOTE — COUNSELING
[AUDIT-C Screening administered and reviewed] : AUDIT-C Screening administered and reviewed [Encouraged to increase physical activity] : Encouraged to increase physical activity [Potential consequences of obesity discussed] : Potential consequences of obesity discussed [Decrease Portions] : decrease portions [Good understanding] : Patient has a good understanding of disease, goals and obesity follow-up plan [Fall prevention counseling provided] : Fall prevention counseling provided [de-identified] : Low na diet \par Low fat diet  [FreeTextEntry2] : ADA diet, low fat and low sodium

## 2020-01-14 NOTE — HEALTH RISK ASSESSMENT
[Monthly or less (1 pt)] : Monthly or less (1 point) [Yes] : Yes [1 or 2 (0 pts)] : 1 or 2 (0 points) [Never (0 pts)] : Never (0 points) [No] : In the past 12 months have you used drugs other than those required for medical reasons? No [No falls in past year] : Patient reported no falls in the past year [0] : 2) Feeling down, depressed, or hopeless: Not at all (0) [Patient reported colonoscopy was abnormal] : Patient reported colonoscopy was abnormal [Audit-CScore] : 1 [MKE7Ckgtk] : 0 [de-identified] : ADA diet  [ColonoscopyComments] : colon polyp  repeat 09/2020 [ColonoscopyDate] : 09/15

## 2020-01-14 NOTE — ASSESSMENT
[FreeTextEntry1] : Patient is a 69 year old male with a past medical history as above who presents for follow up s/p CEA and discharge from the hospital

## 2020-01-21 ENCOUNTER — APPOINTMENT (OUTPATIENT)
Dept: VASCULAR SURGERY | Facility: CLINIC | Age: 70
End: 2020-01-21
Payer: MEDICARE

## 2020-01-21 VITALS
HEIGHT: 68 IN | HEART RATE: 75 BPM | SYSTOLIC BLOOD PRESSURE: 173 MMHG | DIASTOLIC BLOOD PRESSURE: 80 MMHG | WEIGHT: 201 LBS | BODY MASS INDEX: 30.46 KG/M2

## 2020-01-21 PROCEDURE — 99024 POSTOP FOLLOW-UP VISIT: CPT

## 2020-01-22 ENCOUNTER — FORM ENCOUNTER (OUTPATIENT)
Age: 70
End: 2020-01-22

## 2020-01-23 ENCOUNTER — APPOINTMENT (OUTPATIENT)
Dept: NUCLEAR MEDICINE | Facility: CLINIC | Age: 70
End: 2020-01-23
Payer: MEDICARE

## 2020-01-23 ENCOUNTER — OUTPATIENT (OUTPATIENT)
Dept: OUTPATIENT SERVICES | Facility: HOSPITAL | Age: 70
LOS: 1 days | End: 2020-01-23
Payer: MEDICARE

## 2020-01-23 DIAGNOSIS — Z98.890 OTHER SPECIFIED POSTPROCEDURAL STATES: Chronic | ICD-10-CM

## 2020-01-23 DIAGNOSIS — R91.8 OTHER NONSPECIFIC ABNORMAL FINDING OF LUNG FIELD: ICD-10-CM

## 2020-01-23 PROCEDURE — 78815 PET IMAGE W/CT SKULL-THIGH: CPT | Mod: 26,PI

## 2020-01-23 PROCEDURE — A9552: CPT

## 2020-01-23 PROCEDURE — 78815 PET IMAGE W/CT SKULL-THIGH: CPT

## 2020-01-27 NOTE — REASON FOR VISIT
[de-identified] : CEA [de-identified] : Status post carotid endarterectomy. Doing well. Neurologically intact

## 2020-01-29 ENCOUNTER — APPOINTMENT (OUTPATIENT)
Dept: THORACIC SURGERY | Facility: CLINIC | Age: 70
End: 2020-01-29
Payer: MEDICARE

## 2020-01-29 VITALS
OXYGEN SATURATION: 95 % | SYSTOLIC BLOOD PRESSURE: 187 MMHG | BODY MASS INDEX: 29.55 KG/M2 | HEIGHT: 68 IN | HEART RATE: 76 BPM | DIASTOLIC BLOOD PRESSURE: 98 MMHG | WEIGHT: 195 LBS

## 2020-01-29 PROCEDURE — 99205 OFFICE O/P NEW HI 60 MIN: CPT

## 2020-01-30 NOTE — ASSESSMENT
[FreeTextEntry1] : 69 year old male, former smoker Quit 10 years ago, who presents today for thoracic surgery consultation for lung mass revealed on pre-op CXR for carotid endarterectomy. CT chest scan and subsequent PETCT scan were completed. Patient was referred by Dr. Mcmahon. \par \par PETCT scan 1/23/2020: \par -Heterogeneous FDG activity corresponding to an irregular left lower lung mass measuring approximately 3.4 x 2.0 cm (SUV 9.8; image 1:30). No other mass or nodule. \par \par CT chest scan 1/2/2020\par -3.0 x 2.0 x 2.3 cm slightly irregular parenchymal lesion in left lower lung medially with some pleural attachments. Lesion is predominantly solid with some gg elements. \par -RML thin bands of fibrosis. \par -Mild peripheral reticular fibrotic change. \par \par I have reviewed the medical records and  images with the patient and made the following recommendations. I have recommended he undergo a Left VATS, LLL wedge resection possible lobectomy. The risks, benefits, and alternatives to the procedure were discussed with the patient at length. He verbalized understanding, and is in agreement with the above treatment plan. He will need cardiac clearance prior to surgery. \par \par Written by Josephine Ahmadi NP, acting as a scribe for Jordin Ordonez MD.\par The documentation recorded by the scribe accurately reflects the service I personally performed and the decisions made by me. Jordin Ordonez MD

## 2020-01-30 NOTE — PHYSICAL EXAM
[Hearing Threshold Finger Rub Not [place]] : hearing was normal [Examination Of The Oral Cavity] : the lips and gums were normal [Neck Cervical Mass (___cm)] : no neck mass was observed [Jugular Venous Distention Increased] : there was no jugular-venous distention [] : no respiratory distress [Respiration, Rhythm And Depth] : normal respiratory rhythm and effort [Exaggerated Use Of Accessory Muscles For Inspiration] : no accessory muscle use [Auscultation Breath Sounds / Voice Sounds] : lungs were clear to auscultation bilaterally [Heart Rate And Rhythm] : heart rate was normal and rhythm regular [Diminished Respiratory Excursion] : normal chest expansion [Bowel Sounds] : normal bowel sounds [Abdomen Soft] : soft [Cervical Lymph Nodes Enlarged Posterior Bilaterally] : posterior cervical [Abdomen Tenderness] : non-tender [Supraclavicular Lymph Nodes Enlarged Bilaterally] : supraclavicular [Abnormal Walk] : normal gait [No Focal Deficits] : no focal deficits [Skin Color & Pigmentation] : normal skin color and pigmentation [Oriented To Time, Place, And Person] : oriented to person, place, and time [Impaired Insight] : insight and judgment were intact [Affect] : the affect was normal [Mood] : the mood was normal [FreeTextEntry1] : Right neck well-healing incsion s/p carotid endarderectomy.

## 2020-01-30 NOTE — HISTORY OF PRESENT ILLNESS
[FreeTextEntry1] : 69 year old male, former smoker Quit 10 years ago, who presents today for thoracic surgery consultation for lung mass revealed on pre-op CXR for carotid endarterectomy. CT chest scan and subsequent PETCT scan were completed. Patient was referred by Dr. Mcmahon. \par \par PETCT scan 1/23/2020: \par -Nonspecific mild cutaneous hypermetabolism in the right parotid regions (SUV 4.9; image 40), with no corresponding abnormality on CT. Probably inflammation.\par -Approximately symmetric FDG activity in bilateral tonsillar regions, for reference left tonsillar region demonstrates SUV 12.4 (image 40), with no corresponding abnormality on CT. Physiologic vs inflammatory.\par -Heterogeneous FDG activity corresponding to an irregular left lower lung mass measuring approximately 3.4 x 2.0 cm (SUV 9.8; image 1:30). No other mass or nodule. \par -Mildly heterogeneous tracer activity in the prostate gland which measures 3.8 cm in its greatest transverse dimension (SUV 3.4; image 264).\par -Heterogeneous tracer activity in the distal small bowel and colon with no corresponding abnormality on CT, likely related to metformin treatment. \par \par CT chest scan 1/2/2020\par -3.0 x 2.0 x 2.3 cm slightly irregular parenchymal lesion in left lower lung medially with some pleural attachments. Lesion is predominantly solid with some gg elements. \par -RML thin bands of fibrosis. \par -Mild peripheral reticular fibrotic change. \par \par The patient denies shortness of breath, fever, chills, dysphagia or hemoptysis.

## 2020-02-04 ENCOUNTER — APPOINTMENT (OUTPATIENT)
Dept: INTERNAL MEDICINE | Facility: CLINIC | Age: 70
End: 2020-02-04
Payer: MEDICARE

## 2020-02-04 VITALS
RESPIRATION RATE: 14 BRPM | HEART RATE: 72 BPM | TEMPERATURE: 98.3 F | HEIGHT: 68 IN | OXYGEN SATURATION: 98 % | DIASTOLIC BLOOD PRESSURE: 80 MMHG | SYSTOLIC BLOOD PRESSURE: 152 MMHG | BODY MASS INDEX: 30.22 KG/M2 | WEIGHT: 199.38 LBS

## 2020-02-04 DIAGNOSIS — I65.21 OCCLUSION AND STENOSIS OF RIGHT CAROTID ARTERY: ICD-10-CM

## 2020-02-04 DIAGNOSIS — R91.8 OTHER NONSPECIFIC ABNORMAL FINDING OF LUNG FIELD: ICD-10-CM

## 2020-02-04 PROCEDURE — 99214 OFFICE O/P EST MOD 30 MIN: CPT

## 2020-02-05 ENCOUNTER — OUTPATIENT (OUTPATIENT)
Dept: OUTPATIENT SERVICES | Facility: HOSPITAL | Age: 70
LOS: 1 days | End: 2020-02-05

## 2020-02-05 VITALS
SYSTOLIC BLOOD PRESSURE: 140 MMHG | OXYGEN SATURATION: 98 % | TEMPERATURE: 98 F | HEART RATE: 78 BPM | WEIGHT: 199.96 LBS | DIASTOLIC BLOOD PRESSURE: 80 MMHG | RESPIRATION RATE: 16 BRPM | HEIGHT: 66 IN

## 2020-02-05 DIAGNOSIS — R91.1 SOLITARY PULMONARY NODULE: ICD-10-CM

## 2020-02-05 DIAGNOSIS — Z98.890 OTHER SPECIFIED POSTPROCEDURAL STATES: Chronic | ICD-10-CM

## 2020-02-05 DIAGNOSIS — E11.9 TYPE 2 DIABETES MELLITUS WITHOUT COMPLICATIONS: ICD-10-CM

## 2020-02-05 DIAGNOSIS — N43.40 SPERMATOCELE OF EPIDIDYMIS, UNSPECIFIED: Chronic | ICD-10-CM

## 2020-02-05 PROBLEM — I65.21 STENOSIS OF RIGHT CAROTID ARTERY: Status: RESOLVED | Noted: 2019-12-16 | Resolved: 2020-02-05

## 2020-02-05 PROBLEM — R91.8 LUNG MASS: Status: ACTIVE | Noted: 2020-01-06

## 2020-02-05 LAB
ANION GAP SERPL CALC-SCNC: 15 MMO/L — HIGH (ref 7–14)
BUN SERPL-MCNC: 21 MG/DL — SIGNIFICANT CHANGE UP (ref 7–23)
CALCIUM SERPL-MCNC: 10.2 MG/DL — SIGNIFICANT CHANGE UP (ref 8.4–10.5)
CHLORIDE SERPL-SCNC: 100 MMOL/L — SIGNIFICANT CHANGE UP (ref 98–107)
CO2 SERPL-SCNC: 23 MMOL/L — SIGNIFICANT CHANGE UP (ref 22–31)
CREAT SERPL-MCNC: 1.07 MG/DL — SIGNIFICANT CHANGE UP (ref 0.5–1.3)
GLUCOSE SERPL-MCNC: 80 MG/DL — SIGNIFICANT CHANGE UP (ref 70–99)
HBA1C BLD-MCNC: 6.8 % — HIGH (ref 4–5.6)
HCT VFR BLD CALC: 45.4 % — SIGNIFICANT CHANGE UP (ref 39–50)
HGB BLD-MCNC: 14 G/DL — SIGNIFICANT CHANGE UP (ref 13–17)
MCHC RBC-ENTMCNC: 26.8 PG — LOW (ref 27–34)
MCHC RBC-ENTMCNC: 30.8 % — LOW (ref 32–36)
MCV RBC AUTO: 87 FL — SIGNIFICANT CHANGE UP (ref 80–100)
NRBC # FLD: 0 K/UL — SIGNIFICANT CHANGE UP (ref 0–0)
PLATELET # BLD AUTO: 335 K/UL — SIGNIFICANT CHANGE UP (ref 150–400)
PMV BLD: 11.5 FL — SIGNIFICANT CHANGE UP (ref 7–13)
POTASSIUM SERPL-MCNC: 4.4 MMOL/L — SIGNIFICANT CHANGE UP (ref 3.5–5.3)
POTASSIUM SERPL-SCNC: 4.4 MMOL/L — SIGNIFICANT CHANGE UP (ref 3.5–5.3)
RBC # BLD: 5.22 M/UL — SIGNIFICANT CHANGE UP (ref 4.2–5.8)
RBC # FLD: 13.7 % — SIGNIFICANT CHANGE UP (ref 10.3–14.5)
SODIUM SERPL-SCNC: 138 MMOL/L — SIGNIFICANT CHANGE UP (ref 135–145)
WBC # BLD: 8.88 K/UL — SIGNIFICANT CHANGE UP (ref 3.8–10.5)
WBC # FLD AUTO: 8.88 K/UL — SIGNIFICANT CHANGE UP (ref 3.8–10.5)

## 2020-02-05 RX ORDER — SODIUM CHLORIDE 9 MG/ML
1000 INJECTION, SOLUTION INTRAVENOUS
Refills: 0 | Status: DISCONTINUED | OUTPATIENT
Start: 2020-02-11 | End: 2020-02-12

## 2020-02-05 RX ORDER — SODIUM CHLORIDE 9 MG/ML
3 INJECTION INTRAMUSCULAR; INTRAVENOUS; SUBCUTANEOUS EVERY 8 HOURS
Refills: 0 | Status: DISCONTINUED | OUTPATIENT
Start: 2020-02-11 | End: 2020-02-11

## 2020-02-05 NOTE — PHYSICAL EXAM
[Well Nourished] : well nourished [No Acute Distress] : no acute distress [Well Developed] : well developed [Well-Appearing] : well-appearing [PERRL] : pupils equal round and reactive to light [Normal Sclera/Conjunctiva] : normal sclera/conjunctiva [Normal Outer Ear/Nose] : the outer ears and nose were normal in appearance [EOMI] : extraocular movements intact [No Lymphadenopathy] : no lymphadenopathy [No JVD] : no jugular venous distention [Normal TMs] : both tympanic membranes were normal [Normal Oropharynx] : the oropharynx was normal [Supple] : supple [No Respiratory Distress] : no respiratory distress  [Thyroid Normal, No Nodules] : the thyroid was normal and there were no nodules present [Normal Rate] : normal rate  [No Accessory Muscle Use] : no accessory muscle use [Regular Rhythm] : with a regular rhythm [Normal S1, S2] : normal S1 and S2 [No Murmur] : no murmur heard [No Abdominal Bruit] : a ~M bruit was not heard ~T in the abdomen [Pedal Pulses Present] : the pedal pulses are present [No Palpable Aorta] : no palpable aorta [No Edema] : there was no peripheral edema [No Extremity Clubbing/Cyanosis] : no extremity clubbing/cyanosis [Non Tender] : non-tender [Soft] : abdomen soft [No Masses] : no abdominal mass palpated [No HSM] : no HSM [Non-distended] : non-distended [Normal Bowel Sounds] : normal bowel sounds [Normal Posterior Cervical Nodes] : no posterior cervical lymphadenopathy [Normal Anterior Cervical Nodes] : no anterior cervical lymphadenopathy [No Joint Swelling] : no joint swelling [No Spinal Tenderness] : no spinal tenderness [No CVA Tenderness] : no CVA  tenderness [Grossly Normal Strength/Tone] : grossly normal strength/tone [No Rash] : no rash [Coordination Grossly Intact] : coordination grossly intact [No Focal Deficits] : no focal deficits [Deep Tendon Reflexes (DTR)] : deep tendon reflexes were 2+ and symmetric [Normal Gait] : normal gait [Normal Affect] : the affect was normal [Alert and Oriented x3] : oriented to person, place, and time [Speech Grossly Normal] : speech grossly normal [Normal Mood] : the mood was normal [Normal Insight/Judgement] : insight and judgment were intact [de-identified] :   bruit  L [de-identified] : decrease breath sounds  [de-identified] : obese [de-identified] : left bruit  [de-identified] : with murmur  2-3/6

## 2020-02-05 NOTE — ASSESSMENT
[Patient Optimized for Surgery] : Patient optimized for surgery [Cardiology consultation] : Cardiology consultation [Modify anti-platelet treatment prior to procedure] : Modify anti-platelet treatment prior to procedure [FreeTextEntry6] : Hold ASA 7 day prior to procedure  [FreeTextEntry4] : A 69  year old male present to the office for medical clearance for Left lobe resection

## 2020-02-05 NOTE — H&P PST ADULT - NSICDXPROBLEM_GEN_ALL_CORE_FT
PROBLEM DIAGNOSES  Problem: Solitary pulmonary nodule  Assessment and Plan: Pt scheduled for surgery on 2/11/2020.  Pre-op instructions provided. Pt verbalized understanding.   Pepcid provided for GI prophylaxis.   Pt given detailed verbal and written instructions on chlorhexidine wash. Pt verbalized understanding with teachback.   Copy of medical clearance in chart - per pmd can hold aspirin preop.  Copy of last stress and echo in chart.   OR booking notified of LUIS precautions.     Problem: DM (diabetes mellitus)  Assessment and Plan: Pt instructed to hold metformin 24 hours prior to surgery.   OR booking notified of DM.

## 2020-02-05 NOTE — HISTORY OF PRESENT ILLNESS
[Smoker] : smoker [Chronic Anticoagulation] : chronic anticoagulation [Diabetes] : diabetes [(Patient denies any chest pain, claudication, dyspnea on exertion, orthopnea, palpitations or syncope)] : Patient denies any chest pain, claudication, dyspnea on exertion, orthopnea, palpitations or syncope [Spouse] : spouse [Other: _____] : [unfilled] [Aortic Stenosis] : no aortic stenosis [Coronary Artery Disease] : no coronary artery disease [Atrial Fibrillation] : no atrial fibrillation [Recent Myocardial Infarction] : no recent myocardial infarction [Implantable Device/Pacemaker] : no implantable device/pacemaker [Asthma] : no asthma [COPD] : no COPD [Sleep Apnea] : no sleep apnea [Family Member] : no family member with adverse anesthesia reaction/sudden death [Self] : no previous adverse anesthesia reaction [FreeTextEntry1] : Left VATS LLL wedge resection  [Chronic Kidney Disease] : no chronic kidney disease [FreeTextEntry3] :   [FreeTextEntry2] : 02/11/2020 [FreeTextEntry4] : Mr. MASON is a 69 year  male, who present to the office for medical clearance for Left lower lobe wedge resection and a possible lobectomy.  \par Mr. Mason during his Pre-Op evaluation for a CEA was found to have a left lower lobe mass measuring 3.4 x 2.0.  Has remote hx of smoking quit slighly over 10 years ago.  Saw Dr. Ordonez who advised him to have the mass resected.  \par \par Patient states since his CEA surgery he feel well. Has been eating healthy.  States  his blood glucose readings at home is  between .\par States he is schedule for Pre-Op testing in the am at the hospital.   [FreeTextEntry5] : Tobacco use quit 10 year ago,   Questionable TIA

## 2020-02-05 NOTE — H&P PST ADULT - NSICDXPASTMEDICALHX_GEN_ALL_CORE_FT
PAST MEDICAL HISTORY:  Bladder tumor bladder instillations, TURBT    Carotid artery disease     Diabetes mellitus     HTN (hypertension)     Hyperlipidemia     TIA (transient ischemic attack) 7/2019

## 2020-02-05 NOTE — COUNSELING
[Low Fat Diet] : Low fat diet [Good understanding] : Patient has a good understanding of disease, goals and obesity follow-up plan [Weight management counseling provided] : Weight management [Low Salt Diet] : Low salt diet [Walking] : Walking [de-identified] : ADA diet 1800 frederic

## 2020-02-05 NOTE — H&P PST ADULT - HISTORY OF PRESENT ILLNESS
69 year old male with hx carotid artery disease, s/p carotidectomy on .......  Pt states on workup for that surgery pt had imagining done which revealed abnormality in left lung. Pt presents today for presurgical evaluation for .. 69 year old male with hx carotid artery disease, s/p right carotidectomy on 1/8/2020. Pt states on workup for carotid surgery pt had imagining done which revealed abnormality in left lung. Pt presents today for presurgical evaluation for Flexible Bronchoscopy, Left Video Assisted Thoracoscopy, Left Lower Lobe Wedge Resection, Possible Lobectomy scheduled on 2/11/2020. 69 year old male with hx carotid artery disease, s/p right carotidectomy on 1/8/2020. Pt states on workup for carotid surgery pt had imaging done which revealed abnormality in left lung. Pt presents today for presurgical evaluation for Flexible Bronchoscopy, Left Video Assisted Thoracoscopy, Left Lower Lobe Wedge Resection, Possible Lobectomy scheduled on 2/11/2020.

## 2020-02-05 NOTE — H&P PST ADULT - OTHER CARE PROVIDERS
cardio -Dr Ariel Lopez  [phone] cardio -Dr Ariel Lopez  [phone], vascular surgeon - Samir Nance [phone]

## 2020-02-05 NOTE — PLAN
[FreeTextEntry1] : Discussed and reviewed  medical clearance with Dr. Arnoldo Yoo.  \par \par 1. Preoperative EKG is pending \par 2. Pending labs to be taken on 2/5/2020\par 3.  The patient was instructed to stop eating prior to midnight the evening before the procedure.\par 4. The patient was advised to stop medications 1 day prior to the procedure.\par 5. Hold ASA , Omega 3, Fish oil, MVI, Vitamin E 7 days prior to procedure \par 6.  Cash is medical cleared pending labs, Chest x-ray \par 7 Advised pt to start HCTZ 12.5 mg for his HTN.  Continue with ramipril 5 mg.  \par \par Pending  CBC, BMP, PT/INR PTT, U/A, Blood type, and chest x-ray

## 2020-02-05 NOTE — H&P PST ADULT - NSICDXFAMILYHX_GEN_ALL_CORE_FT
FAMILY HISTORY:  FH: diabetes mellitus    Father  Still living? Unknown  Family history of emphysema, Age at diagnosis: Age Unknown    Mother  Still living? Unknown  Family history of diabetes mellitus (DM), Age at diagnosis: Age Unknown  FH: heart attack, Age at diagnosis: Age Unknown

## 2020-02-05 NOTE — H&P PST ADULT - MUSCULOSKELETAL
details… detailed exam normal strength/no calf tenderness/no joint swelling/no joint warmth/ROM intact/no joint erythema

## 2020-02-05 NOTE — H&P PST ADULT - NSICDXPASTSURGICALHX_GEN_ALL_CORE_FT
PAST SURGICAL HISTORY:  History of hydrocelectomy right    S/P carotid endarterectomy 1/2020    S/P cystoscopy transurethral resection of bladder tumor x 2 (2012, 2019)    S/P hernia repair bilateral inguinal hernia repair    Spermatocele s/p surgery

## 2020-02-06 LAB
BLD GP AB SCN SERPL QL: NEGATIVE — SIGNIFICANT CHANGE UP
RH IG SCN BLD-IMP: NEGATIVE — SIGNIFICANT CHANGE UP

## 2020-02-11 ENCOUNTER — APPOINTMENT (OUTPATIENT)
Dept: THORACIC SURGERY | Facility: HOSPITAL | Age: 70
End: 2020-02-11

## 2020-02-11 ENCOUNTER — INPATIENT (INPATIENT)
Facility: HOSPITAL | Age: 70
LOS: 2 days | Discharge: HOME | End: 2020-02-14
Attending: THORACIC SURGERY (CARDIOTHORACIC VASCULAR SURGERY) | Admitting: THORACIC SURGERY (CARDIOTHORACIC VASCULAR SURGERY)
Payer: MEDICARE

## 2020-02-11 ENCOUNTER — RESULT REVIEW (OUTPATIENT)
Age: 70
End: 2020-02-11

## 2020-02-11 VITALS
DIASTOLIC BLOOD PRESSURE: 88 MMHG | SYSTOLIC BLOOD PRESSURE: 158 MMHG | HEART RATE: 71 BPM | OXYGEN SATURATION: 92 % | WEIGHT: 199.96 LBS | RESPIRATION RATE: 16 BRPM | TEMPERATURE: 98 F | HEIGHT: 66 IN

## 2020-02-11 DIAGNOSIS — Z98.890 OTHER SPECIFIED POSTPROCEDURAL STATES: Chronic | ICD-10-CM

## 2020-02-11 DIAGNOSIS — R91.1 SOLITARY PULMONARY NODULE: ICD-10-CM

## 2020-02-11 DIAGNOSIS — N43.40 SPERMATOCELE OF EPIDIDYMIS, UNSPECIFIED: Chronic | ICD-10-CM

## 2020-02-11 LAB
ANION GAP SERPL CALC-SCNC: 19 MMO/L — HIGH (ref 7–14)
APTT BLD: 29.4 SEC — SIGNIFICANT CHANGE UP (ref 27.5–36.3)
BUN SERPL-MCNC: 24 MG/DL — HIGH (ref 7–23)
CALCIUM SERPL-MCNC: 9.6 MG/DL — SIGNIFICANT CHANGE UP (ref 8.4–10.5)
CHLORIDE SERPL-SCNC: 101 MMOL/L — SIGNIFICANT CHANGE UP (ref 98–107)
CHOLEST SERPL-MCNC: 171 MG/DL — SIGNIFICANT CHANGE UP (ref 120–199)
CK MB BLD-MCNC: 2.07 NG/ML — SIGNIFICANT CHANGE UP (ref 1–6.6)
CK MB BLD-MCNC: 2.85 NG/ML — SIGNIFICANT CHANGE UP (ref 1–6.6)
CK SERPL-CCNC: 328 U/L — HIGH (ref 30–200)
CK SERPL-CCNC: 463 U/L — HIGH (ref 30–200)
CO2 SERPL-SCNC: 17 MMOL/L — LOW (ref 22–31)
CREAT SERPL-MCNC: 1.05 MG/DL — SIGNIFICANT CHANGE UP (ref 0.5–1.3)
GLUCOSE BLDC GLUCOMTR-MCNC: 168 MG/DL — HIGH (ref 70–99)
GLUCOSE BLDC GLUCOMTR-MCNC: 228 MG/DL — HIGH (ref 70–99)
GLUCOSE BLDC GLUCOMTR-MCNC: 98 MG/DL — SIGNIFICANT CHANGE UP (ref 70–99)
GLUCOSE SERPL-MCNC: 165 MG/DL — HIGH (ref 70–99)
HBA1C BLD-MCNC: 6.8 % — HIGH (ref 4–5.6)
HCT VFR BLD CALC: 42.6 % — SIGNIFICANT CHANGE UP (ref 39–50)
HDLC SERPL-MCNC: 30 MG/DL — LOW (ref 35–55)
HGB BLD-MCNC: 13.6 G/DL — SIGNIFICANT CHANGE UP (ref 13–17)
INR BLD: 0.98 — SIGNIFICANT CHANGE UP (ref 0.88–1.17)
LIPID PNL WITH DIRECT LDL SERPL: 113 MG/DL — SIGNIFICANT CHANGE UP
MAGNESIUM SERPL-MCNC: 2 MG/DL — SIGNIFICANT CHANGE UP (ref 1.6–2.6)
MCHC RBC-ENTMCNC: 27.3 PG — SIGNIFICANT CHANGE UP (ref 27–34)
MCHC RBC-ENTMCNC: 31.9 % — LOW (ref 32–36)
MCV RBC AUTO: 85.5 FL — SIGNIFICANT CHANGE UP (ref 80–100)
NRBC # FLD: 0 K/UL — SIGNIFICANT CHANGE UP (ref 0–0)
PLATELET # BLD AUTO: 318 K/UL — SIGNIFICANT CHANGE UP (ref 150–400)
PMV BLD: 11.2 FL — SIGNIFICANT CHANGE UP (ref 7–13)
POTASSIUM SERPL-MCNC: 4.7 MMOL/L — SIGNIFICANT CHANGE UP (ref 3.5–5.3)
POTASSIUM SERPL-SCNC: 4.7 MMOL/L — SIGNIFICANT CHANGE UP (ref 3.5–5.3)
PROTHROM AB SERPL-ACNC: 11.2 SEC — SIGNIFICANT CHANGE UP (ref 9.8–13.1)
RBC # BLD: 4.98 M/UL — SIGNIFICANT CHANGE UP (ref 4.2–5.8)
RBC # FLD: 13.8 % — SIGNIFICANT CHANGE UP (ref 10.3–14.5)
RH IG SCN BLD-IMP: NEGATIVE — SIGNIFICANT CHANGE UP
SODIUM SERPL-SCNC: 137 MMOL/L — SIGNIFICANT CHANGE UP (ref 135–145)
TRIGL SERPL-MCNC: 201 MG/DL — HIGH (ref 10–149)
TROPONIN T, HIGH SENSITIVITY: 11 NG/L — SIGNIFICANT CHANGE UP (ref ?–14)
TROPONIN T, HIGH SENSITIVITY: 14 NG/L — SIGNIFICANT CHANGE UP (ref ?–14)
WBC # BLD: 16.49 K/UL — HIGH (ref 3.8–10.5)
WBC # FLD AUTO: 16.49 K/UL — HIGH (ref 3.8–10.5)

## 2020-02-11 PROCEDURE — 88309 TISSUE EXAM BY PATHOLOGIST: CPT | Mod: 26

## 2020-02-11 PROCEDURE — 71045 X-RAY EXAM CHEST 1 VIEW: CPT | Mod: 26

## 2020-02-11 PROCEDURE — 32663 THORACOSCOPY W/LOBECTOMY: CPT

## 2020-02-11 PROCEDURE — 88305 TISSUE EXAM BY PATHOLOGIST: CPT | Mod: 26

## 2020-02-11 PROCEDURE — 32674 THORACOSCOPY LYMPH NODE EXC: CPT

## 2020-02-11 PROCEDURE — 88313 SPECIAL STAINS GROUP 2: CPT | Mod: 26

## 2020-02-11 PROCEDURE — 31622 DX BRONCHOSCOPE/WASH: CPT

## 2020-02-11 PROCEDURE — 32674 THORACOSCOPY LYMPH NODE EXC: CPT | Mod: AS

## 2020-02-11 PROCEDURE — 99233 SBSQ HOSP IP/OBS HIGH 50: CPT

## 2020-02-11 PROCEDURE — 32663 THORACOSCOPY W/LOBECTOMY: CPT | Mod: AS

## 2020-02-11 PROCEDURE — 88331 PATH CONSLTJ SURG 1 BLK 1SPC: CPT | Mod: 26

## 2020-02-11 RX ORDER — NALOXONE HYDROCHLORIDE 4 MG/.1ML
0.1 SPRAY NASAL
Refills: 0 | Status: DISCONTINUED | OUTPATIENT
Start: 2020-02-11 | End: 2020-02-13

## 2020-02-11 RX ORDER — TOBRAMYCIN 0.3 %
1 DROPS OPHTHALMIC (EYE) EVERY 4 HOURS
Refills: 0 | Status: COMPLETED | OUTPATIENT
Start: 2020-02-11 | End: 2020-02-12

## 2020-02-11 RX ORDER — METFORMIN HYDROCHLORIDE 850 MG/1
1 TABLET ORAL
Qty: 0 | Refills: 0 | DISCHARGE

## 2020-02-11 RX ORDER — ACETAMINOPHEN 500 MG
1000 TABLET ORAL ONCE
Refills: 0 | Status: COMPLETED | OUTPATIENT
Start: 2020-02-11 | End: 2021-01-09

## 2020-02-11 RX ORDER — HEPARIN SODIUM 5000 [USP'U]/ML
5000 INJECTION INTRAVENOUS; SUBCUTANEOUS ONCE
Refills: 0 | Status: COMPLETED | OUTPATIENT
Start: 2020-02-11 | End: 2020-02-11

## 2020-02-11 RX ORDER — ATORVASTATIN CALCIUM 80 MG/1
20 TABLET, FILM COATED ORAL AT BEDTIME
Refills: 0 | Status: DISCONTINUED | OUTPATIENT
Start: 2020-02-11 | End: 2020-02-14

## 2020-02-11 RX ORDER — FENOFIBRATE,MICRONIZED 130 MG
145 CAPSULE ORAL AT BEDTIME
Refills: 0 | Status: DISCONTINUED | OUTPATIENT
Start: 2020-02-11 | End: 2020-02-14

## 2020-02-11 RX ORDER — HYDROMORPHONE HYDROCHLORIDE 2 MG/ML
30 INJECTION INTRAMUSCULAR; INTRAVENOUS; SUBCUTANEOUS
Refills: 0 | Status: DISCONTINUED | OUTPATIENT
Start: 2020-02-11 | End: 2020-02-13

## 2020-02-11 RX ORDER — SODIUM CHLORIDE 9 MG/ML
1000 INJECTION, SOLUTION INTRAVENOUS
Refills: 0 | Status: DISCONTINUED | OUTPATIENT
Start: 2020-02-11 | End: 2020-02-11

## 2020-02-11 RX ORDER — INSULIN LISPRO 100/ML
VIAL (ML) SUBCUTANEOUS
Refills: 0 | Status: DISCONTINUED | OUTPATIENT
Start: 2020-02-11 | End: 2020-02-14

## 2020-02-11 RX ORDER — HEPARIN SODIUM 5000 [USP'U]/ML
5000 INJECTION INTRAVENOUS; SUBCUTANEOUS EVERY 8 HOURS
Refills: 0 | Status: DISCONTINUED | OUTPATIENT
Start: 2020-02-11 | End: 2020-02-14

## 2020-02-11 RX ORDER — HYDROMORPHONE HYDROCHLORIDE 2 MG/ML
0.5 INJECTION INTRAMUSCULAR; INTRAVENOUS; SUBCUTANEOUS
Refills: 0 | Status: DISCONTINUED | OUTPATIENT
Start: 2020-02-11 | End: 2020-02-13

## 2020-02-11 RX ORDER — GABAPENTIN 400 MG/1
300 CAPSULE ORAL ONCE
Refills: 0 | Status: COMPLETED | OUTPATIENT
Start: 2020-02-11 | End: 2020-02-11

## 2020-02-11 RX ORDER — ASPIRIN/CALCIUM CARB/MAGNESIUM 324 MG
81 TABLET ORAL DAILY
Refills: 0 | Status: DISCONTINUED | OUTPATIENT
Start: 2020-02-12 | End: 2020-02-14

## 2020-02-11 RX ORDER — DEXTROSE 50 % IN WATER 50 %
12.5 SYRINGE (ML) INTRAVENOUS ONCE
Refills: 0 | Status: DISCONTINUED | OUTPATIENT
Start: 2020-02-11 | End: 2020-02-11

## 2020-02-11 RX ORDER — ACETAMINOPHEN 500 MG
975 TABLET ORAL ONCE
Refills: 0 | Status: COMPLETED | OUTPATIENT
Start: 2020-02-11 | End: 2020-02-11

## 2020-02-11 RX ORDER — GLUCAGON INJECTION, SOLUTION 0.5 MG/.1ML
1 INJECTION, SOLUTION SUBCUTANEOUS ONCE
Refills: 0 | Status: DISCONTINUED | OUTPATIENT
Start: 2020-02-11 | End: 2020-02-11

## 2020-02-11 RX ORDER — RAMIPRIL 5 MG
1 CAPSULE ORAL
Qty: 0 | Refills: 0 | DISCHARGE

## 2020-02-11 RX ORDER — FENOFIBRATE,MICRONIZED 130 MG
1 CAPSULE ORAL
Qty: 0 | Refills: 0 | DISCHARGE

## 2020-02-11 RX ORDER — ONDANSETRON 8 MG/1
4 TABLET, FILM COATED ORAL EVERY 6 HOURS
Refills: 0 | Status: DISCONTINUED | OUTPATIENT
Start: 2020-02-11 | End: 2020-02-14

## 2020-02-11 RX ORDER — INFLUENZA VIRUS VACCINE 15; 15; 15; 15 UG/.5ML; UG/.5ML; UG/.5ML; UG/.5ML
0.5 SUSPENSION INTRAMUSCULAR ONCE
Refills: 0 | Status: COMPLETED | OUTPATIENT
Start: 2020-02-11 | End: 2020-02-11

## 2020-02-11 RX ORDER — DEXTROSE 50 % IN WATER 50 %
25 SYRINGE (ML) INTRAVENOUS ONCE
Refills: 0 | Status: DISCONTINUED | OUTPATIENT
Start: 2020-02-11 | End: 2020-02-14

## 2020-02-11 RX ORDER — DEXTROSE 50 % IN WATER 50 %
25 SYRINGE (ML) INTRAVENOUS ONCE
Refills: 0 | Status: DISCONTINUED | OUTPATIENT
Start: 2020-02-11 | End: 2020-02-11

## 2020-02-11 RX ORDER — SENNA PLUS 8.6 MG/1
2 TABLET ORAL AT BEDTIME
Refills: 0 | Status: DISCONTINUED | OUTPATIENT
Start: 2020-02-11 | End: 2020-02-14

## 2020-02-11 RX ORDER — ASCORBIC ACID 60 MG
1 TABLET,CHEWABLE ORAL
Qty: 0 | Refills: 0 | DISCHARGE

## 2020-02-11 RX ORDER — DEXTROSE 50 % IN WATER 50 %
15 SYRINGE (ML) INTRAVENOUS ONCE
Refills: 0 | Status: DISCONTINUED | OUTPATIENT
Start: 2020-02-11 | End: 2020-02-11

## 2020-02-11 RX ORDER — DIPHENHYDRAMINE HCL 50 MG
25 CAPSULE ORAL EVERY 4 HOURS
Refills: 0 | Status: DISCONTINUED | OUTPATIENT
Start: 2020-02-11 | End: 2020-02-14

## 2020-02-11 RX ORDER — ASPIRIN/CALCIUM CARB/MAGNESIUM 324 MG
1 TABLET ORAL
Qty: 0 | Refills: 0 | DISCHARGE

## 2020-02-11 RX ORDER — ACETAMINOPHEN 500 MG
1000 TABLET ORAL ONCE
Refills: 0 | Status: DISCONTINUED | OUTPATIENT
Start: 2020-02-11 | End: 2020-02-12

## 2020-02-11 RX ORDER — ATORVASTATIN CALCIUM 80 MG/1
1 TABLET, FILM COATED ORAL
Qty: 0 | Refills: 0 | DISCHARGE

## 2020-02-11 RX ADMIN — HYDROMORPHONE HYDROCHLORIDE 30 MILLILITER(S): 2 INJECTION INTRAMUSCULAR; INTRAVENOUS; SUBCUTANEOUS at 19:10

## 2020-02-11 RX ADMIN — SODIUM CHLORIDE 30 MILLILITER(S): 9 INJECTION, SOLUTION INTRAVENOUS at 13:03

## 2020-02-11 RX ADMIN — Medication 975 MILLIGRAM(S): at 07:38

## 2020-02-11 RX ADMIN — ATORVASTATIN CALCIUM 20 MILLIGRAM(S): 80 TABLET, FILM COATED ORAL at 22:05

## 2020-02-11 RX ADMIN — HYDROMORPHONE HYDROCHLORIDE 0.5 MILLIGRAM(S): 2 INJECTION INTRAMUSCULAR; INTRAVENOUS; SUBCUTANEOUS at 16:35

## 2020-02-11 RX ADMIN — HEPARIN SODIUM 5000 UNIT(S): 5000 INJECTION INTRAVENOUS; SUBCUTANEOUS at 15:09

## 2020-02-11 RX ADMIN — Medication 1: at 17:19

## 2020-02-11 RX ADMIN — HYDROMORPHONE HYDROCHLORIDE 30 MILLILITER(S): 2 INJECTION INTRAMUSCULAR; INTRAVENOUS; SUBCUTANEOUS at 13:03

## 2020-02-11 RX ADMIN — Medication 145 MILLIGRAM(S): at 22:04

## 2020-02-11 RX ADMIN — HEPARIN SODIUM 5000 UNIT(S): 5000 INJECTION INTRAVENOUS; SUBCUTANEOUS at 07:27

## 2020-02-11 RX ADMIN — Medication 1 DROP(S): at 17:20

## 2020-02-11 RX ADMIN — SODIUM CHLORIDE 3 MILLILITER(S): 9 INJECTION INTRAMUSCULAR; INTRAVENOUS; SUBCUTANEOUS at 14:52

## 2020-02-11 RX ADMIN — SODIUM CHLORIDE 30 MILLILITER(S): 9 INJECTION, SOLUTION INTRAVENOUS at 19:10

## 2020-02-11 RX ADMIN — Medication 1 DROP(S): at 22:04

## 2020-02-11 RX ADMIN — SODIUM CHLORIDE 30 MILLILITER(S): 9 INJECTION, SOLUTION INTRAVENOUS at 07:39

## 2020-02-11 RX ADMIN — GABAPENTIN 300 MILLIGRAM(S): 400 CAPSULE ORAL at 07:37

## 2020-02-11 RX ADMIN — SENNA PLUS 2 TABLET(S): 8.6 TABLET ORAL at 22:05

## 2020-02-11 RX ADMIN — HEPARIN SODIUM 5000 UNIT(S): 5000 INJECTION INTRAVENOUS; SUBCUTANEOUS at 22:04

## 2020-02-11 NOTE — ASU PATIENT PROFILE, ADULT - MENTAL HEALTH CONDITIONS/SYMPTOMS, PROFILE
REFERRING PHYSICIAN: Shun Benoit MD.     CONSULTING PHYSICIAN: David Flowers M.D. FACS    CHIEF COMPLAINT: reduced EF    HISTORY OF PRESENT ILLNESS: The patient is a 70 y.o. male with a history of chronic anticoagulation for a left apical thrombus, coronary artery disease s/p CABG in 2013, s/p PCI, chronic congestive heart failure, diabetes, hypertension, hyperlipidemia who had a recent echo which showed reduced left ventricular function with an EF of 35%.  He was taken to the cath lab which showed occluded in proximal portion. Distal LAD fills through left to left and right to left collaterals in which they were not able to pass a wire for PCI.  He is asymptomatic at this time.  He is here for evaluation of consideration for redo-coronary artery bypass surgery.      PAST MEDICAL HISTORY:   Past Medical History:   Diagnosis Date   • Backpain     6-7/10   • Bilateral renal cysts    • Bronchitis    • C. difficile diarrhea     pt with hx of c diff after hospitalization [id number]   • CAD (coronary artery disease) March 2013    ACS. PCI/ANA PAULA x 2 of the LAD (2.5 x 12mm - mid; 2.75 x 16mm - proximal). Distal RCA is occluded; distal circumflex is occluded with good collateralization.   • Chronic anticoagulation    • Chronic systolic congestive heart failure (HCC)    • DIABETES MELLITUS      oral diet and insulin   • Heart burn    • Hyperlipidemia     • Hypertension     well controlled on meds   • Indigestion    • Left ventricular apical thrombus March 2013    Inferoapical clot measuring 1.1cm x 0.8cm, started on anticoagulation.   • Myocardial infarct (HCC) 3/7/2013   • Psychiatric problem     anxiety       PAST SURGICAL HISTORY:   Past Surgical History:   Procedure Laterality Date   • MULTIPLE CORONARY ARTERY BYPASS ENDO VEIN HARVEST  7/22/2016    Procedure: MULTIPLE CORONARY ARTERY BYPASS ENDO VEIN HARVEST;  Surgeon: David Flowers M.D.;  Location: SURGERY [facility];  Service:    • LILLY  7/22/2016     Procedure: LILLY;  Surgeon: David Flowers M.D.;  Location: SURGERY [facility];  Service:    • RECOVERY  7/21/2016    Procedure: CATH LAB [facility] WITH POSSIBLE DR. LYN;  Surgeon: [facility];  Location: SURGERY PRE-POST PROC UNIT [facility];  Service:    • OTHER NEUROLOGICAL SURG  2014    diskectomy   • OTHER CARDIAC SURGERY  2013    stent   • CHOLECYSTECTOMY  2004       FAMILY HISTORY:   Family History   Problem Relation Age of Onset   • Cancer Mother    • Hypertension Father    • Hyperlipidemia Brother         x 2   • Hypertension Brother         x2   • Heart Disease Brother         x2, afib; cad x 1   • Cancer Paternal Uncle    • Diabetes Neg Hx         SOCIAL HISTORY:   Social History     Social History   • Marital status:      Spouse name: N/A   • Number of children: N/A   • Years of education: N/A     Occupational History   • Not on file.     Social History Main Topics   • Smoking status: Never Smoker   • Smokeless tobacco: Never Used   • Alcohol use No   • Drug use: No   • Sexual activity: Yes     Partners: Female     Other Topics Concern   • Not on file     Social History Narrative   • No narrative on file       ALLERGIES:   Allergies   Allergen Reactions   • Byetta      urticaria   • Other Environmental      Grass and cats        CURRENT MEDICATIONS:     Current Outpatient Prescriptions:   •  liraglutide (VICTOZA) 18 MG/3ML Solution Pen-injector injection, Inject 0.3 mL as instructed every bedtime., Disp: 27 mL, Rfl: 1  •  carvedilol (COREG) 25 MG Tab, Take 1 Tab by mouth 2 times a day, with meals., Disp: 180 Tab, Rfl: 3  •  rosuvastatin (CRESTOR) 20 MG Tab, Take 1 Tab by mouth every evening., Disp: 90 Tab, Rfl: 3  •  clopidogrel (PLAVIX) 75 MG Tab, Take 1 Tab by mouth every day., Disp: 90 Tab, Rfl: 3  •  GABAPENTIN PO, Take 300 mg by mouth as needed., Disp: , Rfl:   •  methocarbamol (ROBAXIN) 500 MG Tab, Take 1,000 mg by mouth 3 times a day as needed., Disp: , Rfl:   •  omeprazole  (PRILOSEC) 20 MG delayed-release capsule, Take 1 Cap by mouth 1 time daily as needed. TAKE 1 CAPSULE BY MOUTH ONCE DAILY, Disp: , Rfl:   •  tamsulosin (FLOMAX) 0.4 MG capsule, Take 1 Cap by mouth every evening., Disp: , Rfl:   •  citalopram (CELEXA) 20 MG Tab, Take 1 Tab by mouth every 48 hours., Disp: 30 Tab, Rfl: 11  •  ezetimibe (ZETIA) 10 MG Tab, Take 1 Tab by mouth every bedtime., Disp: 90 Tab, Rfl: 3  •  HYDROcodone-acetaminophen (NORCO) 5-325 MG Tab per tablet, , Disp: , Rfl:   •  Lancets, 1 Each by Does not apply route 3 times a day. Lancets for Freestyle Lite meter. Sig: use TID and prn ssx high or low sugar., Disp: 100 Each, Rfl: 11  •  allopurinol (ZYLOPRIM) 300 MG Tab, TAKE 1 TABLET BY MOUTH ONCE DAILY, Disp: 90 Tab, Rfl: 3  •  lisinopril (PRINIVIL) 5 MG Tab, Take 1 Tab by mouth every day., Disp: 90 Tab, Rfl: 3  •  JARDIANCE 25 MG Tab, Take  by mouth every day., Disp: , Rfl:   •  glipiZIDE (GLUCOTROL) 5 MG Tab, Take 2.5 mg by mouth 2 times a day., Disp: , Rfl:   •  metFORMIN (GLUCOPHAGE) 850 MG Tab, 2 times a day., Disp: , Rfl:   •  aspirin 81 MG tablet, Take 81 mg by mouth every bedtime., Disp: , Rfl:      LABS REVIEWED:  Lab Results   Component Value Date/Time    SODIUM 141 04/29/2019 01:05 PM    POTASSIUM 4.3 04/29/2019 01:05 PM    CHLORIDE 106 04/29/2019 01:05 PM    CO2 27 04/29/2019 01:05 PM    GLUCOSE 153 (H) 04/29/2019 01:05 PM    BUN 18 04/29/2019 01:05 PM    CREATININE 0.99 04/29/2019 01:05 PM      Lab Results   Component Value Date/Time    PROTHROMBTM 13.7 04/29/2019 01:05 PM    INR 1.04 04/29/2019 01:05 PM      Lab Results   Component Value Date/Time    WBC 7.9 04/29/2019 01:05 PM    RBC 5.70 04/29/2019 01:05 PM    HEMOGLOBIN 14.3 04/29/2019 01:05 PM    HEMATOCRIT 46.3 04/29/2019 01:05 PM    MCV 81.2 (L) 04/29/2019 01:05 PM    MCH 25.1 (L) 04/29/2019 01:05 PM    MCHC 30.9 (L) 04/29/2019 01:05 PM    MPV 10.9 04/29/2019 01:05 PM    NEUTSPOLYS 83.50 (H) 03/22/2019 04:04 AM    LYMPHOCYTES 9.80 (L)  03/22/2019 04:04 AM    MONOCYTES 5.90 03/22/2019 04:04 AM    EOSINOPHILS 0.20 03/22/2019 04:04 AM    BASOPHILS 0.30 03/22/2019 04:04 AM    HYPOCHROMIA 1+ 09/16/2013 01:13 PM        IMAGING REVIEWED AND INTERPRETED:    ECHOCARDIOGRAM:   Akinetic mid to distal anterospetal wall and apex.  Hypokinetic basal inferoposterior wall.  Left ventricle is moderately dilated.  Moderately reduced left ventricular systolic function.  Left ventricular ejection fraction is visually estimated to be 35%.  Structurally normal mitral valve without significant stenosis or   regurgitation.  Aortic sclerosis without stenosis.  Moderately dilated left atrium.  Structurally normal tricuspid valve without significant stenosis or   regurgitation.  Unable to estimate pulmonary artery pressure due to an inadequate   tricuspid regurgitant jet.  Normal right ventricular size and systolic function.  Normal inferior vena cava size and inspiratory collapse.  No pericardial effusion seen.  Compared to prior echo done 09/12/16,  there has been no significant   change.     ANGIOGRAM:   1.  Left main coronary artery:  Normal.  2.  Left anterior descending artery:  Occluded in proximal portion. Distal LAD fills through left to left and right to left collaterals.  3.  Left circumflex coronary artery: Chronically occluded, marginal artery fills through right to left collaterals, this was not bypassed.  Large Ramus branch has 70% proximal disease, distal vessel fills through LIMA.  4.  Right coronary artery:  Distal RCA is occluded,fills through vein graft.  This is a right dominant system.  5. Vein graft to RCA: widely patent, good run off  6. Vein graft to LAD: occluded  7.  LIMA to large Ramus: widely patent, good run off.  5.  Left ventricular end diastolic pressure:  22mmHg.  No signficant gradient across the aortic valve.  6.  Left ventriculogram:  Ejection fraction of 25-30%.    REVIEW OF SYSTEMS:   ROS  Constitutional:  negative for chills, fever  "and malaise/fatigue.  Respiratory:  Negative for cough.  Cardiovascular:  Negative for chest pain.  Negative for palpitations, orthopnea, claudication, and PND.  Gastrointestinal:  negative for abdominal pain.  Musculoskeletal:  Negative for myalgias.  Neurological  Negative for dizziness.      All other systems were reviewed with the patient and are negative.    PHYSICAL EXAMINATION:   Physical Exam  CONSTITUTIONAL:  /60 (BP Location: Right arm, Patient Position: Sitting, BP Cuff Size: Adult)   Pulse 77   Temp (!) 35.8 °C (96.4 °F) (Temporal)   Ht 1.803 m (5' 11\")   Wt 94.9 kg (209 lb 3.5 oz)   SpO2 93%     General appearance: No apparent distress, normal development  HEENT:  Anicteric sclerae, moist conjunctivae, moist mucous membranes, good dentition   NECK:  Supple without jugular venous distention, without lymphadenopathy    SKIN:   Normal skin turgor, no stasis dermatitis or ulcers noted.  RESPIRATORY:  Clear to auscultation bilaterally, respirations are regular, symmetrical and unlabored.   CARDIAC:  Regular rate and rhythm, no murmurs. No carotid bruits. Peripheral pulses palpable.   GASTROINTESTINAL:  Soft, non-distended, non-tender with bowel sounds present, no hepatosplenomegaly  EXTREMITIES:  No clubbing, cyanosis, or changes consistent with peripheral vascular disease. No peripheral edema or varicosities   NEUROLOGIC/ PSYCHIATRIC: Alert and oriented times three. Mood and affect normal  MUSCULOSKELETAL:  Normal gait and station    IMPRESSION:  70-year-old white male here for evaluation of coronary artery disease status post coronary artery bypass grafting 7 years ago who now has an occluded vein graft to left anterior descending but good collateral circulation from the right coronary artery.      PLAN:  Recommend against redo coronary artery bypass grafting, agree with Dr. Benoit that the patient should continue on medical therapy particularly since he remains asymptomatic.     Findings and " recommendations have been discussed with the patient’s cardiologist Shun Benoit.  Thank you for this very challenging consultation and participation in the patient’s care.  I will keep you apprised of all future developments.      Sincerely,       David Flowers M.D. David GUNN M.D. FACS performed a substantial portion of the EM visit face-to-face with the same patient on the same date of service with the APRN, Cailin Trujillo. I was personally involved in reviewing and interpreting the films and conducted elements of the history and physical exam. I performed all of the medical decision making for the patient.   none

## 2020-02-11 NOTE — PROGRESS NOTE ADULT - SUBJECTIVE AND OBJECTIVE BOX
PROCEDURE: LVATS, LLL Lobectomy, MLND 11-Feb-2020      ISSUES:   Lung nodule  Post op pain  Chest tube in place  S/P R Carotid endartarectomy 1/2020  Bladder tumor s/p TURBT  CAD  DM2  HTN  HLD  Hx of TIA      INTERVAL EVENTS:   OR today. Extubated in OR. Transferred to CTICU.  On arrival to CTICU, had TWI and ST depression. Denies chest pain.        HISTORY:   Patient reports moderate pain at chest wall incision sites which is worse with coughing and deep breathing without associated fever or dyspnea. Pain is improved with use of pain meds.     PHYSICAL EXAM:   Gen: Comfortable, No acute distress  Eyes: Sclera white, Conjunctiva normal, Eyelids normal, Pupils symmetrical   ENT: Mucous membranes moist,  ,  ,    Neck: Trachea midline,  ,  ,  ,  ,    CV: Rate regular, Rhythm regular,  ,    Resp: Breath sounds clear, No accessory muscles use, L chest tube in place,    Abd: Soft, Non-distended, Non-tender, Bowel sounds normal,  ,    Skin: Warm, No peripheral edema of lower extremities,    : No pugh  Neuro: Moving all 4 extremities,    Psych: A&Ox3      ASSESSMENT AND PLAN:     NEURO:  Post-operative Pain - Pain control with PCA and Tylenol IV PRN.                          RESPIRATORY:  Hypoxia - Wean nasal cannula for goal O2sat above 92. Obtain CXR. Incentive spirometry. Chest PT and frequent suctioning. Continue bronchodilators. OOB to chair & ambulate w/ assistance. Continuous pulse oximetry for support & to prevent decompensation.    Chest tube – Pleurevac water seal. Monitor chest tube output.                    CARDIOVASCULAR:  Hemodynamically stable - Not on pressors. Continue hemodynamic monitoring.  HTN - stable. Hold home antihypertensives for now.  CAD - stable. Continue aspirin.                         RENAL:  Stable – LR IVF 30mL/hr. Monitor IOs and electrolytes.          GASTROINTESTINAL:  GI prophylaxis not indicated  Zofran and Reglan IV PRN for nausea  Regular consistency diet              HEMATOLOGIC:  No signs of active bleeding. Monitor Hgb in CBC in AM  DVT prophylaxis with heparin subQ and SCDs.      INFECTIOUS DISEASE:  All surgical sites appear clean. No signs of active infection. Will monitor for fever and leukocytosis.                           ENDOCRINE:  Stable – Monitor glucose fingersticks for goal 120-180.            Pertinent clinical, laboratory, radiographic, hemodynamic, echocardiographic, respiratory data, microbiologic data and chart were reviewed by myself and analyzed frequently throughout the course of the day and night by myself.    Plan discussed at length with the CTICU staff and Attending CT Surgeon.   Patient's status was discussed with patient at bedside.           ________________________________________________  _________________________  VITAL SIGNS:  Vital Signs Last 24 Hrs  T(C): 36.4 (11 Feb 2020 16:00), Max: 36.7 (11 Feb 2020 06:17)  T(F): 97.6 (11 Feb 2020 16:00), Max: 98.1 (11 Feb 2020 06:17)  HR: 86 (11 Feb 2020 17:00) (71 - 88)  BP: 143/75 (11 Feb 2020 17:00) (131/74 - 167/84)  BP(mean): 95 (11 Feb 2020 17:00) (91 - 109)  RR: 21 (11 Feb 2020 17:00) (16 - 24)  SpO2: 90% (11 Feb 2020 17:00) (90% - 95%)  I/Os:   I&O's Detail    11 Feb 2020 07:01  -  11 Feb 2020 17:41  --------------------------------------------------------  IN:    lactated ringers.: 180 mL  Total IN: 180 mL    OUT:    Chest Tube: 60 mL  Total OUT: 60 mL    Total NET: 120 mL              MEDICATIONS:  MEDICATIONS  (STANDING):  atorvastatin 20 milliGRAM(s) Oral at bedtime  dextrose 50% Injectable 25 Gram(s) IV Push once  fenofibrate Tablet 145 milliGRAM(s) Oral at bedtime  heparin  Injectable 5000 Unit(s) SubCutaneous every 8 hours  HYDROmorphone PCA (1 mG/mL) 30 milliLiter(s) PCA Continuous PCA Continuous  influenza   Vaccine 0.5 milliLiter(s) IntraMuscular once  insulin lispro (HumaLOG) corrective regimen sliding scale   SubCutaneous three times a day before meals  lactated ringers. 1000 milliLiter(s) (30 mL/Hr) IV Continuous <Continuous>  senna 2 Tablet(s) Oral at bedtime  tobramycin 0.3% Ophthalmic Solution 1 Drop(s) Left EYE every 4 hours    MEDICATIONS  (PRN):  diphenhydrAMINE   Injectable 25 milliGRAM(s) IV Push every 4 hours PRN Pruritus  HYDROmorphone PCA (1 mG/mL) Rescue Clinician Bolus 0.5 milliGRAM(s) IV Push every 15 minutes PRN for Pain Scale GREATER THAN 6  naloxone Injectable 0.1 milliGRAM(s) IV Push every 3 minutes PRN For ANY of the following changes in patient status:  A. RR LESS THAN 10 breaths per minute, B. Oxygen saturation LESS THAN 90%, C. Sedation score of 6  ondansetron Injectable 4 milliGRAM(s) IV Push every 6 hours PRN Nausea      LABS:                        13.6   16.49 )-----------( 318      ( 11 Feb 2020 13:20 )             42.6     02-11    137  |  101  |  24<H>  ----------------------------<  165<H>  4.7   |  17<L>  |  1.05    Ca    9.6      11 Feb 2020 13:20  Mg     2.0     02-11        PT/INR - ( 11 Feb 2020 13:20 )   PT: 11.2 SEC;   INR: 0.98          PTT - ( 11 Feb 2020 13:20 )  PTT:29.4 SEC      _________________________ PROCEDURE: LVATS, LLL Lobectomy, MLND 11-Feb-2020      ISSUES:   Lung nodule  Post op pain  Chest tube in place  S/P R Carotid endartarectomy 1/2020  Bladder tumor s/p TURBT  CAD  DM2  HTN  HLD  Hx of TIA      INTERVAL EVENTS:   OR today. Extubated in OR. Transferred to CTICU.  On arrival to CTICU, had TWI and ST depression. Denies chest pain.        HISTORY:   Patient reports moderate pain at chest wall incision sites which is worse with coughing and deep breathing without associated fever or dyspnea. Pain is improved with use of pain meds.     PHYSICAL EXAM:   Gen: Comfortable, No acute distress  Eyes: Sclera white, Conjunctiva normal, Eyelids normal, Pupils symmetrical   ENT: Mucous membranes moist,  ,  ,    Neck: Trachea midline,  ,  ,  ,  ,    CV: Rate regular, Rhythm regular,  ,    Resp: Breath sounds clear, No accessory muscles use, L chest tube in place,    Abd: Soft, Non-distended, Non-tender, Bowel sounds normal,  ,    Skin: Warm, No peripheral edema of lower extremities,    : No pugh  Neuro: Moving all 4 extremities,    Psych: A&Ox3      ASSESSMENT AND PLAN:     NEURO:  Post-operative Pain - Pain control with PCA and Tylenol IV PRN.                          RESPIRATORY:  Hypoxia - Wean nasal cannula for goal O2sat above 92. Obtain CXR. Incentive spirometry. Chest PT and frequent suctioning. Continue bronchodilators. OOB to chair & ambulate w/ assistance. Continuous pulse oximetry for support & to prevent decompensation.    Chest tube – Pleurevac water seal. Monitor chest tube output.                    CARDIOVASCULAR:  Hemodynamically stable - Not on pressors. Continue hemodynamic monitoring.  HTN - stable. Hold home antihypertensives for now.  CAD - stable. Continue aspirin.                         RENAL:  Stable – LR IVF 30mL/hr. Monitor IOs and electrolytes.          GASTROINTESTINAL:  GI prophylaxis not indicated  Zofran and Reglan IV PRN for nausea  Regular consistency diet              HEMATOLOGIC:  No signs of active bleeding. Monitor Hgb in CBC in AM  DVT prophylaxis with heparin subQ and SCDs.      INFECTIOUS DISEASE:  All surgical sites appear clean. No signs of active infection. Will monitor for fever and leukocytosis.                           ENDOCRINE:  DM2 – Stable. Monitor glucose fingersticks for goal 120-180. Insulin sliding scale. Carb control diet.            Pertinent clinical, laboratory, radiographic, hemodynamic, echocardiographic, respiratory data, microbiologic data and chart were reviewed by myself and analyzed frequently throughout the course of the day and night by myself.    Plan discussed at length with the CTICU staff and Attending CT Surgeon.   Patient's status was discussed with patient at bedside.           ________________________________________________  _________________________  VITAL SIGNS:  Vital Signs Last 24 Hrs  T(C): 36.4 (11 Feb 2020 16:00), Max: 36.7 (11 Feb 2020 06:17)  T(F): 97.6 (11 Feb 2020 16:00), Max: 98.1 (11 Feb 2020 06:17)  HR: 86 (11 Feb 2020 17:00) (71 - 88)  BP: 143/75 (11 Feb 2020 17:00) (131/74 - 167/84)  BP(mean): 95 (11 Feb 2020 17:00) (91 - 109)  RR: 21 (11 Feb 2020 17:00) (16 - 24)  SpO2: 90% (11 Feb 2020 17:00) (90% - 95%)  I/Os:   I&O's Detail    11 Feb 2020 07:01  -  11 Feb 2020 17:41  --------------------------------------------------------  IN:    lactated ringers.: 180 mL  Total IN: 180 mL    OUT:    Chest Tube: 60 mL  Total OUT: 60 mL    Total NET: 120 mL              MEDICATIONS:  MEDICATIONS  (STANDING):  atorvastatin 20 milliGRAM(s) Oral at bedtime  dextrose 50% Injectable 25 Gram(s) IV Push once  fenofibrate Tablet 145 milliGRAM(s) Oral at bedtime  heparin  Injectable 5000 Unit(s) SubCutaneous every 8 hours  HYDROmorphone PCA (1 mG/mL) 30 milliLiter(s) PCA Continuous PCA Continuous  influenza   Vaccine 0.5 milliLiter(s) IntraMuscular once  insulin lispro (HumaLOG) corrective regimen sliding scale   SubCutaneous three times a day before meals  lactated ringers. 1000 milliLiter(s) (30 mL/Hr) IV Continuous <Continuous>  senna 2 Tablet(s) Oral at bedtime  tobramycin 0.3% Ophthalmic Solution 1 Drop(s) Left EYE every 4 hours    MEDICATIONS  (PRN):  diphenhydrAMINE   Injectable 25 milliGRAM(s) IV Push every 4 hours PRN Pruritus  HYDROmorphone PCA (1 mG/mL) Rescue Clinician Bolus 0.5 milliGRAM(s) IV Push every 15 minutes PRN for Pain Scale GREATER THAN 6  naloxone Injectable 0.1 milliGRAM(s) IV Push every 3 minutes PRN For ANY of the following changes in patient status:  A. RR LESS THAN 10 breaths per minute, B. Oxygen saturation LESS THAN 90%, C. Sedation score of 6  ondansetron Injectable 4 milliGRAM(s) IV Push every 6 hours PRN Nausea      LABS:                        13.6   16.49 )-----------( 318      ( 11 Feb 2020 13:20 )             42.6     02-11    137  |  101  |  24<H>  ----------------------------<  165<H>  4.7   |  17<L>  |  1.05    Ca    9.6      11 Feb 2020 13:20  Mg     2.0     02-11        PT/INR - ( 11 Feb 2020 13:20 )   PT: 11.2 SEC;   INR: 0.98          PTT - ( 11 Feb 2020 13:20 )  PTT:29.4 SEC      _________________________ PROCEDURE: LVATS, LLL Lobectomy, MLND 11-Feb-2020      ISSUES:   Lung nodule  Post op pain  Chest tube in place  S/P R Carotid endartarectomy 1/2020  Bladder tumor s/p TURBT  CAD  DM2  HTN  HLD  Hx of TIA      INTERVAL EVENTS:   OR today. Extubated in OR. Transferred to CTICU.  On arrival to CTICU, had TWI and ST depression. Denies chest pain. Troponin x2 negative.  Stress test from 1/2020 reviewed -- negative stress test with adequate METS.        HISTORY:   Patient reports moderate pain at chest wall incision sites which is worse with coughing and deep breathing without associated fever or dyspnea. Pain is improved with use of pain meds.     PHYSICAL EXAM:   Gen: Comfortable, No acute distress  Eyes: Sclera white, Conjunctiva normal, Eyelids normal, Pupils symmetrical   ENT: Mucous membranes moist,  ,  ,    Neck: Trachea midline,  ,  ,  ,  ,    CV: Rate regular, Rhythm regular,  ,    Resp: Breath sounds clear, No accessory muscles use, L chest tube in place,    Abd: Soft, Non-distended, Non-tender, Bowel sounds normal,  ,    Skin: Warm, No peripheral edema of lower extremities,    : No pugh  Neuro: Moving all 4 extremities,    Psych: A&Ox3      ASSESSMENT AND PLAN:     NEURO:  Post-operative Pain - Pain control with PCA and Tylenol IV PRN.                          RESPIRATORY:  Hypoxia - Wean nasal cannula for goal O2sat above 92. Obtain CXR. Incentive spirometry. Chest PT and frequent suctioning. Continue bronchodilators. OOB to chair & ambulate w/ assistance. Continuous pulse oximetry for support & to prevent decompensation.    Chest tube – Pleurevac water seal. Monitor chest tube output.                    CARDIOVASCULAR:  Hemodynamically stable - Not on pressors. Continue hemodynamic monitoring.  HTN - stable. Hold home antihypertensives for now.  CAD - stable. Continue aspirin.                         RENAL:  Stable – LR IVF 30mL/hr. Monitor IOs and electrolytes.          GASTROINTESTINAL:  GI prophylaxis not indicated  Zofran and Reglan IV PRN for nausea  Regular consistency diet              HEMATOLOGIC:  No signs of active bleeding. Monitor Hgb in CBC in AM  DVT prophylaxis with heparin subQ and SCDs.      INFECTIOUS DISEASE:  All surgical sites appear clean. No signs of active infection. Will monitor for fever and leukocytosis.                           ENDOCRINE:  DM2 – Stable. Monitor glucose fingersticks for goal 120-180. Insulin sliding scale. Carb control diet.            Pertinent clinical, laboratory, radiographic, hemodynamic, echocardiographic, respiratory data, microbiologic data and chart were reviewed by myself and analyzed frequently throughout the course of the day and night by myself.    Plan discussed at length with the CTICU staff and Attending CT Surgeon.   Patient's status was discussed with patient at bedside.           ________________________________________________  _________________________  VITAL SIGNS:  Vital Signs Last 24 Hrs  T(C): 36.4 (11 Feb 2020 16:00), Max: 36.7 (11 Feb 2020 06:17)  T(F): 97.6 (11 Feb 2020 16:00), Max: 98.1 (11 Feb 2020 06:17)  HR: 86 (11 Feb 2020 17:00) (71 - 88)  BP: 143/75 (11 Feb 2020 17:00) (131/74 - 167/84)  BP(mean): 95 (11 Feb 2020 17:00) (91 - 109)  RR: 21 (11 Feb 2020 17:00) (16 - 24)  SpO2: 90% (11 Feb 2020 17:00) (90% - 95%)  I/Os:   I&O's Detail    11 Feb 2020 07:01  -  11 Feb 2020 17:41  --------------------------------------------------------  IN:    lactated ringers.: 180 mL  Total IN: 180 mL    OUT:    Chest Tube: 60 mL  Total OUT: 60 mL    Total NET: 120 mL              MEDICATIONS:  MEDICATIONS  (STANDING):  atorvastatin 20 milliGRAM(s) Oral at bedtime  dextrose 50% Injectable 25 Gram(s) IV Push once  fenofibrate Tablet 145 milliGRAM(s) Oral at bedtime  heparin  Injectable 5000 Unit(s) SubCutaneous every 8 hours  HYDROmorphone PCA (1 mG/mL) 30 milliLiter(s) PCA Continuous PCA Continuous  influenza   Vaccine 0.5 milliLiter(s) IntraMuscular once  insulin lispro (HumaLOG) corrective regimen sliding scale   SubCutaneous three times a day before meals  lactated ringers. 1000 milliLiter(s) (30 mL/Hr) IV Continuous <Continuous>  senna 2 Tablet(s) Oral at bedtime  tobramycin 0.3% Ophthalmic Solution 1 Drop(s) Left EYE every 4 hours    MEDICATIONS  (PRN):  diphenhydrAMINE   Injectable 25 milliGRAM(s) IV Push every 4 hours PRN Pruritus  HYDROmorphone PCA (1 mG/mL) Rescue Clinician Bolus 0.5 milliGRAM(s) IV Push every 15 minutes PRN for Pain Scale GREATER THAN 6  naloxone Injectable 0.1 milliGRAM(s) IV Push every 3 minutes PRN For ANY of the following changes in patient status:  A. RR LESS THAN 10 breaths per minute, B. Oxygen saturation LESS THAN 90%, C. Sedation score of 6  ondansetron Injectable 4 milliGRAM(s) IV Push every 6 hours PRN Nausea      LABS:                        13.6   16.49 )-----------( 318      ( 11 Feb 2020 13:20 )             42.6     02-11    137  |  101  |  24<H>  ----------------------------<  165<H>  4.7   |  17<L>  |  1.05    Ca    9.6      11 Feb 2020 13:20  Mg     2.0     02-11        PT/INR - ( 11 Feb 2020 13:20 )   PT: 11.2 SEC;   INR: 0.98          PTT - ( 11 Feb 2020 13:20 )  PTT:29.4 SEC      _________________________

## 2020-02-11 NOTE — ASU PATIENT PROFILE, ADULT - PMH
Bladder tumor  bladder instillations, TURBT  Carotid artery disease    Diabetes mellitus    HTN (hypertension)    Hyperlipidemia    TIA (transient ischemic attack)  7/2019

## 2020-02-11 NOTE — ASU PATIENT PROFILE, ADULT - PSH
History of hydrocelectomy  right  S/P carotid endarterectomy  1/2020  S/P cystoscopy  transurethral resection of bladder tumor x 2 (2012, 2019)  S/P hernia repair  bilateral inguinal hernia repair  Spermatocele  s/p surgery

## 2020-02-11 NOTE — ASU PATIENT PROFILE, ADULT - PREOP PAIN SCORE
Problem: Anxiety:  Goal: Level of anxiety will decrease  Description  Level of anxiety will decrease  Outcome: Ongoing     Problem: Venous Thromboembolism - Risk of:  Goal: Will show no signs or symptoms of venous thromboembolism  Description  Will show no signs or symptoms of venous thromboembolism  Outcome: Ongoing     Problem: Discharge Planning:  Goal: Discharged to appropriate level of care  Description  Discharged to appropriate level of care  Outcome: Ongoing     Problem: Fluid Volume - Imbalance:  Goal: Absence of postpartum hemorrhage signs and symptoms  Description  Absence of postpartum hemorrhage signs and symptoms  Outcome: Ongoing  Goal: Absence of imbalanced fluid volume signs and symptoms  Description  Absence of imbalanced fluid volume signs and symptoms  Outcome: Ongoing     Problem: Infection - Surgical Site:  Goal: Will show no infection signs and symptoms  Description  Will show no infection signs and symptoms  Outcome: Ongoing     Problem: Pain - Acute:  Goal: Pain level will decrease  Description  Pain level will decrease  Outcome: Ongoing     Problem: Urinary Retention:  Goal: Urinary elimination within specified parameters  Description  Urinary elimination within specified parameters  Outcome: Ongoing
Problem: Anxiety:  Goal: Level of anxiety will decrease  Outcome: Ongoing     Problem: Venous Thromboembolism - Risk of:  Goal: Will show no signs or symptoms of venous thromboembolism  Outcome: Ongoing     Problem: Discharge Planning:  Goal: Discharged to appropriate level of care  Outcome: Ongoing     Problem: Fluid Volume - Imbalance:  Goal: Absence of postpartum hemorrhage signs and symptoms  Outcome: Ongoing  Goal: Absence of imbalanced fluid volume signs and symptoms  Outcome: Ongoing     Problem: Infection - Surgical Site:  Goal: Will show no infection signs and symptoms  Outcome: Ongoing     Problem: Pain - Acute:  Goal: Pain level will decrease  Outcome: Ongoing     Problem: Urinary Retention:  Goal: Urinary elimination within specified parameters  Outcome: Ongoing
Problem: Discharge Planning:  Goal: Discharged to appropriate level of care  Outcome: Completed     Problem: Fluid Volume - Imbalance:  Goal: Absence of postpartum hemorrhage signs and symptoms  4/5/2019 1503 by Madie Iglesias RN  Outcome: Completed  4/5/2019 0541 by Rosa Isela Zambrano RN  Outcome: Met This Shift  Goal: Absence of imbalanced fluid volume signs and symptoms  4/5/2019 1503 by Madie Iglesias RN  Outcome: Completed  4/5/2019 0541 by Rosa Isela Zambrano RN  Outcome: Met This Shift     Problem: Infection - Surgical Site:  Goal: Will show no infection signs and symptoms  4/5/2019 1503 by Madie Iglesias RN  Outcome: Completed  4/5/2019 0541 by Rosa Isela Zambrano RN  Outcome: Met This Shift     Problem: Pain - Acute:  Goal: Pain level will decrease  4/5/2019 1503 by Madie Iglesias RN  Outcome: Completed  4/5/2019 0541 by Rosa Isela Zambrano RN  Outcome: Met This Shift     Problem: Urinary Retention:  Goal: Urinary elimination within specified parameters  4/5/2019 1503 by Madie Iglesias RN  Outcome: Completed  4/5/2019 0541 by Rosa Isela Zambrano RN  Outcome: Met This Shift     Problem: Pain:  Goal: Pain level will decrease  4/5/2019 1503 by Madie Iglesias RN  Outcome: Completed  4/5/2019 0541 by Rosa Isela Zambrano RN  Outcome: Met This Shift  Goal: Control of acute pain  4/5/2019 1503 by Madie Iglesias RN  Outcome: Completed  4/5/2019 0541 by Rosa Isela Zambrano RN  Outcome: Met This Shift
Problem: Fluid Volume - Imbalance:  Goal: Absence of postpartum hemorrhage signs and symptoms  Description  Absence of postpartum hemorrhage signs and symptoms  4/5/2019 0541 by Zahida English RN  Outcome: Met This Shift  4/4/2019 1913 by Brook Núñez RN  Outcome: Ongoing  Goal: Absence of imbalanced fluid volume signs and symptoms  Description  Absence of imbalanced fluid volume signs and symptoms  4/5/2019 0541 by Zahida English RN  Outcome: Met This Shift  4/4/2019 1913 by Brook Núñez RN  Outcome: Ongoing     Problem: Infection - Surgical Site:  Goal: Will show no infection signs and symptoms  Description  Will show no infection signs and symptoms  4/5/2019 0541 by Zahida English RN  Outcome: Met This Shift  4/4/2019 1913 by Brook Núñez RN  Outcome: Ongoing     Problem: Pain - Acute:  Goal: Pain level will decrease  Description  Pain level will decrease  4/5/2019 0541 by Zahida English RN  Outcome: Met This Shift  4/4/2019 1913 by Brook Núñez RN  Outcome: Ongoing     Problem: Urinary Retention:  Goal: Urinary elimination within specified parameters  Description  Urinary elimination within specified parameters  4/5/2019 0541 by Zahida English RN  Outcome: Met This Shift  4/4/2019 1913 by Brook Núñez RN  Outcome: Ongoing     Problem: Pain:  Goal: Pain level will decrease  Description  Pain level will decrease  4/5/2019 0541 by Zahida English RN  Outcome: Met This Shift  4/4/2019 1913 by Brook Núñez RN  Outcome: Ongoing  Goal: Control of acute pain  Description  Control of acute pain  4/5/2019 0541 by Zahida English RN  Outcome: Met This Shift  4/4/2019 1913 by Brook Núñez RN  Outcome: Ongoing
Problem: Fluid Volume - Imbalance:  Goal: Absence of postpartum hemorrhage signs and symptoms  Description  Absence of postpartum hemorrhage signs and symptoms  Outcome: Met This Shift  Goal: Absence of imbalanced fluid volume signs and symptoms  Description  Absence of imbalanced fluid volume signs and symptoms  Outcome: Met This Shift     Problem: Infection - Surgical Site:  Goal: Will show no infection signs and symptoms  Description  Will show no infection signs and symptoms  Outcome: Met This Shift     Problem: Pain - Acute:  Goal: Pain level will decrease  Description  Pain level will decrease  Outcome: Met This Shift     Problem: Urinary Retention:  Goal: Urinary elimination within specified parameters  Description  Urinary elimination within specified parameters  Outcome: Met This Shift     Problem: Pain:  Goal: Pain level will decrease  Description  Pain level will decrease  Outcome: Met This Shift  Goal: Control of acute pain  Description  Control of acute pain  Outcome: Met This Shift
0

## 2020-02-11 NOTE — BRIEF OPERATIVE NOTE - NSICDXBRIEFPROCEDURE_GEN_ALL_CORE_FT
PROCEDURES:  Thorascopic resection of left lung 11-Feb-2020 13:00:49 LVATS, LLL Lobectomy, MLND Chanelle Gaspar

## 2020-02-12 LAB
ANION GAP SERPL CALC-SCNC: 13 MMO/L — SIGNIFICANT CHANGE UP (ref 7–14)
BASOPHILS # BLD AUTO: 0.04 K/UL — SIGNIFICANT CHANGE UP (ref 0–0.2)
BASOPHILS NFR BLD AUTO: 0.3 % — SIGNIFICANT CHANGE UP (ref 0–2)
BUN SERPL-MCNC: 21 MG/DL — SIGNIFICANT CHANGE UP (ref 7–23)
CALCIUM SERPL-MCNC: 9.5 MG/DL — SIGNIFICANT CHANGE UP (ref 8.4–10.5)
CHLORIDE SERPL-SCNC: 102 MMOL/L — SIGNIFICANT CHANGE UP (ref 98–107)
CK MB BLD-MCNC: 2.35 NG/ML — SIGNIFICANT CHANGE UP (ref 1–6.6)
CK SERPL-CCNC: 505 U/L — HIGH (ref 30–200)
CO2 SERPL-SCNC: 24 MMOL/L — SIGNIFICANT CHANGE UP (ref 22–31)
CREAT SERPL-MCNC: 1.09 MG/DL — SIGNIFICANT CHANGE UP (ref 0.5–1.3)
EOSINOPHIL # BLD AUTO: 0.06 K/UL — SIGNIFICANT CHANGE UP (ref 0–0.5)
EOSINOPHIL NFR BLD AUTO: 0.5 % — SIGNIFICANT CHANGE UP (ref 0–6)
GLUCOSE BLDC GLUCOMTR-MCNC: 136 MG/DL — HIGH (ref 70–99)
GLUCOSE BLDC GLUCOMTR-MCNC: 176 MG/DL — HIGH (ref 70–99)
GLUCOSE BLDC GLUCOMTR-MCNC: 189 MG/DL — HIGH (ref 70–99)
GLUCOSE BLDC GLUCOMTR-MCNC: 200 MG/DL — HIGH (ref 70–99)
GLUCOSE SERPL-MCNC: 128 MG/DL — HIGH (ref 70–99)
HCT VFR BLD CALC: 40.9 % — SIGNIFICANT CHANGE UP (ref 39–50)
HGB BLD-MCNC: 12.6 G/DL — LOW (ref 13–17)
IMM GRANULOCYTES NFR BLD AUTO: 0.4 % — SIGNIFICANT CHANGE UP (ref 0–1.5)
LYMPHOCYTES # BLD AUTO: 18.8 % — SIGNIFICANT CHANGE UP (ref 13–44)
LYMPHOCYTES # BLD AUTO: 2.34 K/UL — SIGNIFICANT CHANGE UP (ref 1–3.3)
MCHC RBC-ENTMCNC: 26.4 PG — LOW (ref 27–34)
MCHC RBC-ENTMCNC: 30.8 % — LOW (ref 32–36)
MCV RBC AUTO: 85.7 FL — SIGNIFICANT CHANGE UP (ref 80–100)
MONOCYTES # BLD AUTO: 1.23 K/UL — HIGH (ref 0–0.9)
MONOCYTES NFR BLD AUTO: 9.9 % — SIGNIFICANT CHANGE UP (ref 2–14)
NEUTROPHILS # BLD AUTO: 8.72 K/UL — HIGH (ref 1.8–7.4)
NEUTROPHILS NFR BLD AUTO: 70.1 % — SIGNIFICANT CHANGE UP (ref 43–77)
NRBC # FLD: 0 K/UL — SIGNIFICANT CHANGE UP (ref 0–0)
PLATELET # BLD AUTO: 299 K/UL — SIGNIFICANT CHANGE UP (ref 150–400)
PMV BLD: 11.1 FL — SIGNIFICANT CHANGE UP (ref 7–13)
POTASSIUM SERPL-MCNC: 4.2 MMOL/L — SIGNIFICANT CHANGE UP (ref 3.5–5.3)
POTASSIUM SERPL-SCNC: 4.2 MMOL/L — SIGNIFICANT CHANGE UP (ref 3.5–5.3)
RBC # BLD: 4.77 M/UL — SIGNIFICANT CHANGE UP (ref 4.2–5.8)
RBC # FLD: 14.1 % — SIGNIFICANT CHANGE UP (ref 10.3–14.5)
SODIUM SERPL-SCNC: 139 MMOL/L — SIGNIFICANT CHANGE UP (ref 135–145)
TROPONIN T, HIGH SENSITIVITY: 20 NG/L — SIGNIFICANT CHANGE UP (ref ?–14)
WBC # BLD: 12.44 K/UL — HIGH (ref 3.8–10.5)
WBC # FLD AUTO: 12.44 K/UL — HIGH (ref 3.8–10.5)

## 2020-02-12 PROCEDURE — 99221 1ST HOSP IP/OBS SF/LOW 40: CPT

## 2020-02-12 PROCEDURE — 71045 X-RAY EXAM CHEST 1 VIEW: CPT | Mod: 26

## 2020-02-12 PROCEDURE — 99233 SBSQ HOSP IP/OBS HIGH 50: CPT

## 2020-02-12 RX ORDER — ALBUTEROL 90 UG/1
2.5 AEROSOL, METERED ORAL EVERY 6 HOURS
Refills: 0 | Status: DISCONTINUED | OUTPATIENT
Start: 2020-02-12 | End: 2020-02-14

## 2020-02-12 RX ORDER — ACETAMINOPHEN 500 MG
1000 TABLET ORAL ONCE
Refills: 0 | Status: COMPLETED | OUTPATIENT
Start: 2020-02-12 | End: 2020-02-12

## 2020-02-12 RX ORDER — SODIUM CHLORIDE 9 MG/ML
4 INJECTION INTRAMUSCULAR; INTRAVENOUS; SUBCUTANEOUS EVERY 8 HOURS
Refills: 0 | Status: DISCONTINUED | OUTPATIENT
Start: 2020-02-12 | End: 2020-02-14

## 2020-02-12 RX ORDER — ACETAMINOPHEN 500 MG
650 TABLET ORAL EVERY 6 HOURS
Refills: 0 | Status: COMPLETED | OUTPATIENT
Start: 2020-02-12 | End: 2020-02-14

## 2020-02-12 RX ORDER — SODIUM CHLORIDE 9 MG/ML
250 INJECTION, SOLUTION INTRAVENOUS ONCE
Refills: 0 | Status: COMPLETED | OUTPATIENT
Start: 2020-02-12 | End: 2020-02-12

## 2020-02-12 RX ORDER — LISINOPRIL 2.5 MG/1
2.5 TABLET ORAL DAILY
Refills: 0 | Status: DISCONTINUED | OUTPATIENT
Start: 2020-02-12 | End: 2020-02-14

## 2020-02-12 RX ADMIN — Medication 81 MILLIGRAM(S): at 12:30

## 2020-02-12 RX ADMIN — SENNA PLUS 2 TABLET(S): 8.6 TABLET ORAL at 21:08

## 2020-02-12 RX ADMIN — Medication 400 MILLIGRAM(S): at 02:00

## 2020-02-12 RX ADMIN — Medication 1000 MILLIGRAM(S): at 02:15

## 2020-02-12 RX ADMIN — SODIUM CHLORIDE 4 MILLILITER(S): 9 INJECTION INTRAMUSCULAR; INTRAVENOUS; SUBCUTANEOUS at 11:24

## 2020-02-12 RX ADMIN — Medication 1: at 16:45

## 2020-02-12 RX ADMIN — HEPARIN SODIUM 5000 UNIT(S): 5000 INJECTION INTRAVENOUS; SUBCUTANEOUS at 21:08

## 2020-02-12 RX ADMIN — SODIUM CHLORIDE 1000 MILLILITER(S): 9 INJECTION, SOLUTION INTRAVENOUS at 07:00

## 2020-02-12 RX ADMIN — ALBUTEROL 2.5 MILLIGRAM(S): 90 AEROSOL, METERED ORAL at 16:32

## 2020-02-12 RX ADMIN — SODIUM CHLORIDE 30 MILLILITER(S): 9 INJECTION, SOLUTION INTRAVENOUS at 07:24

## 2020-02-12 RX ADMIN — SODIUM CHLORIDE 4 MILLILITER(S): 9 INJECTION INTRAMUSCULAR; INTRAVENOUS; SUBCUTANEOUS at 22:00

## 2020-02-12 RX ADMIN — ATORVASTATIN CALCIUM 20 MILLIGRAM(S): 80 TABLET, FILM COATED ORAL at 21:08

## 2020-02-12 RX ADMIN — HYDROMORPHONE HYDROCHLORIDE 30 MILLILITER(S): 2 INJECTION INTRAMUSCULAR; INTRAVENOUS; SUBCUTANEOUS at 07:25

## 2020-02-12 RX ADMIN — HYDROMORPHONE HYDROCHLORIDE 30 MILLILITER(S): 2 INJECTION INTRAMUSCULAR; INTRAVENOUS; SUBCUTANEOUS at 19:14

## 2020-02-12 RX ADMIN — HEPARIN SODIUM 5000 UNIT(S): 5000 INJECTION INTRAVENOUS; SUBCUTANEOUS at 16:19

## 2020-02-12 RX ADMIN — HEPARIN SODIUM 5000 UNIT(S): 5000 INJECTION INTRAVENOUS; SUBCUTANEOUS at 07:25

## 2020-02-12 RX ADMIN — ALBUTEROL 2.5 MILLIGRAM(S): 90 AEROSOL, METERED ORAL at 21:50

## 2020-02-12 RX ADMIN — SODIUM CHLORIDE 4 MILLILITER(S): 9 INJECTION INTRAMUSCULAR; INTRAVENOUS; SUBCUTANEOUS at 16:34

## 2020-02-12 RX ADMIN — Medication 145 MILLIGRAM(S): at 21:08

## 2020-02-12 RX ADMIN — Medication 1: at 08:24

## 2020-02-12 RX ADMIN — LISINOPRIL 2.5 MILLIGRAM(S): 2.5 TABLET ORAL at 09:39

## 2020-02-12 RX ADMIN — ALBUTEROL 2.5 MILLIGRAM(S): 90 AEROSOL, METERED ORAL at 11:22

## 2020-02-12 NOTE — PROGRESS NOTE ADULT - SUBJECTIVE AND OBJECTIVE BOX
PROCEDURE: LVATS, LLL Lobectomy, MLND 11-Feb-2020      ISSUES:   Lung nodule  Post op pain  Chest tube in place  S/P R Carotid endartarectomy 1/2020  Bladder tumor s/p TURBT  CAD  DM2  HTN  HLD  Hx of TIA      INTERVAL EVENTS:   No overnight events.        HISTORY:   Patient reports moderate pain at chest wall incision sites which is worse with coughing and deep breathing without associated fever or dyspnea. Pain is improved with use of pain meds.     PHYSICAL EXAM:   Gen: Comfortable, No acute distress  Eyes: Sclera white, Conjunctiva normal, Eyelids normal, Pupils symmetrical   ENT: Mucous membranes moist,  ,  ,    Neck: Trachea midline,  ,  ,  ,  ,    CV: Rate regular, Rhythm regular,  ,    Resp: Breath sounds clear, No accessory muscles use, L chest tube in place,    Abd: Soft, Non-distended, Non-tender, Bowel sounds normal,  ,    Skin: Warm, No peripheral edema of lower extremities,    : No pugh  Neuro: Moving all 4 extremities,    Psych: A&Ox3      ASSESSMENT AND PLAN:     NEURO:  Post-operative Pain - Pain control with PCA and Tylenol IV PRN.                          RESPIRATORY:  Hypoxia - Wean nasal cannula for goal O2sat above 92. Obtain CXR. Incentive spirometry. Chest PT and frequent suctioning. Continue bronchodilators. OOB to chair & ambulate w/ assistance. Continuous pulse oximetry for support & to prevent decompensation.    Chest tube – Pleurevac water seal. Monitor chest tube output.                    CARDIOVASCULAR:  Hemodynamically stable - Not on pressors. Continue hemodynamic monitoring.  HTN - stable. Hold home antihypertensives for now.  CAD - stable. Continue aspirin.                         RENAL:  Stable – LR IVF 30mL/hr. Monitor IOs and electrolytes.          GASTROINTESTINAL:  GI prophylaxis not indicated  Zofran and Reglan IV PRN for nausea  Regular consistency diet              HEMATOLOGIC:  No signs of active bleeding. Monitor Hgb in CBC in AM  DVT prophylaxis with heparin subQ and SCDs.      INFECTIOUS DISEASE:  All surgical sites appear clean. No signs of active infection. Will monitor for fever and leukocytosis.              _________________________  VITAL SIGNS:  Vital Signs Last 24 Hrs  T(C): 37.2 (12 Feb 2020 04:00), Max: 37.2 (12 Feb 2020 04:00)  T(F): 99 (12 Feb 2020 04:00), Max: 99 (12 Feb 2020 04:00)  HR: 78 (12 Feb 2020 04:00) (74 - 93)  BP: 152/78 (12 Feb 2020 04:00) (123/67 - 176/103)  BP(mean): 96 (12 Feb 2020 04:00) (82 - 125)  RR: 22 (12 Feb 2020 04:00) (14 - 25)  SpO2: 96% (12 Feb 2020 04:00) (90% - 97%)  I/Os:   I&O's Detail    11 Feb 2020 07:01  -  12 Feb 2020 06:42  --------------------------------------------------------  IN:    lactated ringers.: 330 mL    Oral Fluid: 240 mL  Total IN: 570 mL    OUT:    Chest Tube: 120 mL    Voided: 600 mL  Total OUT: 720 mL    Total NET: -150 mL              MEDICATIONS:  MEDICATIONS  (STANDING):  acetaminophen  IVPB .. 1000 milliGRAM(s) IV Intermittent once  ALBUTerol    0.083% 2.5 milliGRAM(s) Nebulizer every 6 hours  aspirin enteric coated 81 milliGRAM(s) Oral daily  atorvastatin 20 milliGRAM(s) Oral at bedtime  dextrose 50% Injectable 25 Gram(s) IV Push once  fenofibrate Tablet 145 milliGRAM(s) Oral at bedtime  heparin  Injectable 5000 Unit(s) SubCutaneous every 8 hours  HYDROmorphone PCA (1 mG/mL) 30 milliLiter(s) PCA Continuous PCA Continuous  influenza   Vaccine 0.5 milliLiter(s) IntraMuscular once  insulin lispro (HumaLOG) corrective regimen sliding scale   SubCutaneous three times a day before meals  lactated ringers Bolus 250 milliLiter(s) IV Bolus once  lactated ringers. 1000 milliLiter(s) (30 mL/Hr) IV Continuous <Continuous>  senna 2 Tablet(s) Oral at bedtime  sodium chloride 3%  Inhalation 4 milliLiter(s) Inhalation every 8 hours    MEDICATIONS  (PRN):  diphenhydrAMINE   Injectable 25 milliGRAM(s) IV Push every 4 hours PRN Pruritus  HYDROmorphone PCA (1 mG/mL) Rescue Clinician Bolus 0.5 milliGRAM(s) IV Push every 15 minutes PRN for Pain Scale GREATER THAN 6  naloxone Injectable 0.1 milliGRAM(s) IV Push every 3 minutes PRN For ANY of the following changes in patient status:  A. RR LESS THAN 10 breaths per minute, B. Oxygen saturation LESS THAN 90%, C. Sedation score of 6  ondansetron Injectable 4 milliGRAM(s) IV Push every 6 hours PRN Nausea      LABS:                        12.6   12.44 )-----------( 299      ( 12 Feb 2020 03:50 )             40.9     02-12    139  |  102  |  21  ----------------------------<  128<H>  4.2   |  24  |  1.09    Ca    9.5      12 Feb 2020 03:50  Mg     2.0     02-11        PT/INR - ( 11 Feb 2020 13:20 )   PT: 11.2 SEC;   INR: 0.98          PTT - ( 11 Feb 2020 13:20 )  PTT:29.4 SEC      _________________________                   ENDOCRINE:  DM2 – Stable. Monitor glucose fingersticks for goal 120-180. Insulin sliding scale. Carb control diet.            Pertinent clinical, laboratory, radiographic, hemodynamic, echocardiographic, respiratory data, microbiologic data and chart were reviewed by myself and analyzed frequently throughout the course of the day and night by myself.    Plan discussed at length with the CTICU staff and Attending CT Surgeon.   Patient's status was discussed with patient at bedside. PROCEDURE: LVATS, LLL Lobectomy, MLND 11-Feb-2020      ISSUES:   Lung nodule  Post op pain  Chest tube in place  S/P R Carotid endartarectomy 1/2020  Bladder tumor s/p TURBT  CAD  DM2  HTN  HLD  Hx of TIA      INTERVAL EVENTS:   No overnight events.        HISTORY:   Patient reports moderate pain at chest wall incision sites which is worse with coughing and deep breathing without associated fever or dyspnea. Pain is improved with use of pain meds.     PHYSICAL EXAM:   Gen: Comfortable, No acute distress  Eyes: Sclera white, Conjunctiva normal, Eyelids normal, Pupils symmetrical   ENT: Mucous membranes moist,  ,  ,    Neck: Trachea midline,  ,  ,  ,  ,    CV: Rate regular, Rhythm regular,  ,    Resp: Breath sounds clear, No accessory muscles use, L chest tube in place,    Abd: Soft, Non-distended, Non-tender, Bowel sounds normal,  ,    Skin: Warm, No peripheral edema of lower extremities,    : No pugh  Neuro: Moving all 4 extremities,    Psych: A&Ox3      ASSESSMENT AND PLAN:     NEURO:  Post-operative Pain - Pain control with PCA and Tylenol IV PRN.                          RESPIRATORY:  Hypoxia - Wean nasal cannula for goal O2sat above 92. Obtain CXR. Incentive spirometry. Chest PT and frequent suctioning. Continue bronchodilators. OOB to chair & ambulate w/ assistance. Continuous pulse oximetry for support & to prevent decompensation.    Chest tube – Pleurevac water seal. Monitor chest tube output.                    CARDIOVASCULAR:  Hemodynamically stable - Not on pressors. Continue hemodynamic monitoring.  HTN - stable. Hold home antihypertensives for now.  CAD - stable. Continue aspirin.                         RENAL:  Stable – LR IVF 30mL/hr. Monitor IOs and electrolytes.          GASTROINTESTINAL:  GI prophylaxis not indicated  Zofran and Reglan IV PRN for nausea  Regular consistency diet              HEMATOLOGIC:  No signs of active bleeding. Monitor Hgb in CBC in AM  DVT prophylaxis with heparin subQ and SCDs.      INFECTIOUS DISEASE:  All surgical sites appear clean. No signs of active infection. Will monitor for fever and leukocytosis.                  ENDOCRINE:  DM2 – Stable. Monitor glucose fingersticks for goal 120-180. Insulin sliding scale. Carb control diet.            Pertinent clinical, laboratory, radiographic, hemodynamic, echocardiographic, respiratory data, microbiologic data and chart were reviewed by myself and analyzed frequently throughout the course of the day and night by myself.    Plan discussed at length with the CTICU staff and Attending CT Surgeon.   Patient's status was discussed with patient at bedside.              _________________________  VITAL SIGNS:  Vital Signs Last 24 Hrs  T(C): 37.2 (12 Feb 2020 04:00), Max: 37.2 (12 Feb 2020 04:00)  T(F): 99 (12 Feb 2020 04:00), Max: 99 (12 Feb 2020 04:00)  HR: 78 (12 Feb 2020 04:00) (74 - 93)  BP: 152/78 (12 Feb 2020 04:00) (123/67 - 176/103)  BP(mean): 96 (12 Feb 2020 04:00) (82 - 125)  RR: 22 (12 Feb 2020 04:00) (14 - 25)  SpO2: 96% (12 Feb 2020 04:00) (90% - 97%)  I/Os:   I&O's Detail    11 Feb 2020 07:01  -  12 Feb 2020 06:42  --------------------------------------------------------  IN:    lactated ringers.: 330 mL    Oral Fluid: 240 mL  Total IN: 570 mL    OUT:    Chest Tube: 120 mL    Voided: 600 mL  Total OUT: 720 mL    Total NET: -150 mL              MEDICATIONS:  MEDICATIONS  (STANDING):  acetaminophen  IVPB .. 1000 milliGRAM(s) IV Intermittent once  ALBUTerol    0.083% 2.5 milliGRAM(s) Nebulizer every 6 hours  aspirin enteric coated 81 milliGRAM(s) Oral daily  atorvastatin 20 milliGRAM(s) Oral at bedtime  dextrose 50% Injectable 25 Gram(s) IV Push once  fenofibrate Tablet 145 milliGRAM(s) Oral at bedtime  heparin  Injectable 5000 Unit(s) SubCutaneous every 8 hours  HYDROmorphone PCA (1 mG/mL) 30 milliLiter(s) PCA Continuous PCA Continuous  influenza   Vaccine 0.5 milliLiter(s) IntraMuscular once  insulin lispro (HumaLOG) corrective regimen sliding scale   SubCutaneous three times a day before meals  lactated ringers Bolus 250 milliLiter(s) IV Bolus once  lactated ringers. 1000 milliLiter(s) (30 mL/Hr) IV Continuous <Continuous>  senna 2 Tablet(s) Oral at bedtime  sodium chloride 3%  Inhalation 4 milliLiter(s) Inhalation every 8 hours    MEDICATIONS  (PRN):  diphenhydrAMINE   Injectable 25 milliGRAM(s) IV Push every 4 hours PRN Pruritus  HYDROmorphone PCA (1 mG/mL) Rescue Clinician Bolus 0.5 milliGRAM(s) IV Push every 15 minutes PRN for Pain Scale GREATER THAN 6  naloxone Injectable 0.1 milliGRAM(s) IV Push every 3 minutes PRN For ANY of the following changes in patient status:  A. RR LESS THAN 10 breaths per minute, B. Oxygen saturation LESS THAN 90%, C. Sedation score of 6  ondansetron Injectable 4 milliGRAM(s) IV Push every 6 hours PRN Nausea      LABS:                        12.6   12.44 )-----------( 299      ( 12 Feb 2020 03:50 )             40.9     02-12    139  |  102  |  21  ----------------------------<  128<H>  4.2   |  24  |  1.09    Ca    9.5      12 Feb 2020 03:50  Mg     2.0     02-11        PT/INR - ( 11 Feb 2020 13:20 )   PT: 11.2 SEC;   INR: 0.98          PTT - ( 11 Feb 2020 13:20 )  PTT:29.4 SEC      _________________________

## 2020-02-12 NOTE — PROGRESS NOTE ADULT - SUBJECTIVE AND OBJECTIVE BOX
Anesthesia Pain Management Service    SUBJECTIVE: Pt doing well with IV PCA without problems reported.    Therapy:	  [ X] IV PCA	   [ ] Epidural           [ ] s/p Spinal Opoid              [ ] Postpartum infusion	  [ ] Patient controlled regional anesthesia (PCRA)    [ ] prn Analgesics    Allergies    No Known Allergies    Intolerances      MEDICATIONS  (STANDING):  acetaminophen   Tablet .. 650 milliGRAM(s) Oral every 6 hours  ALBUTerol    0.083% 2.5 milliGRAM(s) Nebulizer every 6 hours  aspirin enteric coated 81 milliGRAM(s) Oral daily  atorvastatin 20 milliGRAM(s) Oral at bedtime  dextrose 50% Injectable 25 Gram(s) IV Push once  fenofibrate Tablet 145 milliGRAM(s) Oral at bedtime  heparin  Injectable 5000 Unit(s) SubCutaneous every 8 hours  HYDROmorphone PCA (1 mG/mL) 30 milliLiter(s) PCA Continuous PCA Continuous  insulin lispro (HumaLOG) corrective regimen sliding scale   SubCutaneous three times a day before meals  lisinopril 2.5 milliGRAM(s) Oral daily  senna 2 Tablet(s) Oral at bedtime  sodium chloride 3%  Inhalation 4 milliLiter(s) Inhalation every 8 hours    MEDICATIONS  (PRN):  diphenhydrAMINE   Injectable 25 milliGRAM(s) IV Push every 4 hours PRN Pruritus  HYDROmorphone PCA (1 mG/mL) Rescue Clinician Bolus 0.5 milliGRAM(s) IV Push every 15 minutes PRN for Pain Scale GREATER THAN 6  naloxone Injectable 0.1 milliGRAM(s) IV Push every 3 minutes PRN For ANY of the following changes in patient status:  A. RR LESS THAN 10 breaths per minute, B. Oxygen saturation LESS THAN 90%, C. Sedation score of 6  ondansetron Injectable 4 milliGRAM(s) IV Push every 6 hours PRN Nausea      OBJECTIVE:   [X] No new signs     [ ] Other:    Side Effects:  [X ] None			[ ] Other:    Assessment of Catheter Site:		[ ] Intact		[ ] Other:    ASSESSMENT/PLAN  [ X] Continue current therapy. Recommend non-opioid adjuvant analgesics to be used when possible and when allowed by primary surgical team.    [ ] Therapy changed to:    [ ] IV PCA       [ ] Epidural     [ ] prn Analgesics     Comments:    Progress Note written now but Patient was seen earlier.

## 2020-02-12 NOTE — PROGRESS NOTE ADULT - SUBJECTIVE AND OBJECTIVE BOX
ANESTHESIA POSTOP CHECK    69y Male POSTOP DAY 1 S/P left VATS/lobectomy    Vital Signs Last 24 Hrs  T(C): 36.6 (12 Feb 2020 08:00), Max: 37.2 (12 Feb 2020 04:00)  T(F): 97.8 (12 Feb 2020 08:00), Max: 99 (12 Feb 2020 04:00)  HR: 80 (12 Feb 2020 09:00) (73 - 93)  BP: 166/75 (12 Feb 2020 09:00) (121/69 - 176/103)  BP(mean): 100 (12 Feb 2020 09:00) (82 - 125)  RR: 26 (12 Feb 2020 09:00) (14 - 26)  SpO2: 93% (12 Feb 2020 09:00) (90% - 97%)  I&O's Summary    11 Feb 2020 07:01  -  12 Feb 2020 07:00  --------------------------------------------------------  IN: 570 mL / OUT: 720 mL / NET: -150 mL    12 Feb 2020 07:01  -  12 Feb 2020 10:14  --------------------------------------------------------  IN: 270 mL / OUT: 20 mL / NET: 250 mL        [x] NO APPARENT ANESTHESIA COMPLICATIONS      Jeffery Betancur MD [id number]  Pager [phone] / [phone]

## 2020-02-12 NOTE — PROGRESS NOTE ADULT - SUBJECTIVE AND OBJECTIVE BOX
Anesthesia Pain Management Service    SUBJECTIVE: Patient is doing well with IV PCA and no significant problems reported.    Pain Scale Score	At rest: _2-3/10__ 	With Activity: ___ 	[X ] Refer to charted pain scores    THERAPY:    [ ] IV PCA Morphine		[ ] 5 mg/mL	[ ] 1 mg/mL  [X ] IV PCA Hydromorphone	[ ] 5 mg/mL	[X ] 1 mg/mL  [ ] IV PCA Fentanyl		[ ] 50 micrograms/mL    Demand dose __0.2_ lockout __6_ (minutes) Continuous Rate _0__ Total: _3.4__  mg used (in past 24 hours)      MEDICATIONS  (STANDING):  acetaminophen   Tablet .. 650 milliGRAM(s) Oral every 6 hours  ALBUTerol    0.083% 2.5 milliGRAM(s) Nebulizer every 6 hours  aspirin enteric coated 81 milliGRAM(s) Oral daily  atorvastatin 20 milliGRAM(s) Oral at bedtime  dextrose 50% Injectable 25 Gram(s) IV Push once  fenofibrate Tablet 145 milliGRAM(s) Oral at bedtime  heparin  Injectable 5000 Unit(s) SubCutaneous every 8 hours  HYDROmorphone PCA (1 mG/mL) 30 milliLiter(s) PCA Continuous PCA Continuous  insulin lispro (HumaLOG) corrective regimen sliding scale   SubCutaneous three times a day before meals  lisinopril 2.5 milliGRAM(s) Oral daily  senna 2 Tablet(s) Oral at bedtime  sodium chloride 3%  Inhalation 4 milliLiter(s) Inhalation every 8 hours    MEDICATIONS  (PRN):  diphenhydrAMINE   Injectable 25 milliGRAM(s) IV Push every 4 hours PRN Pruritus  HYDROmorphone PCA (1 mG/mL) Rescue Clinician Bolus 0.5 milliGRAM(s) IV Push every 15 minutes PRN for Pain Scale GREATER THAN 6  naloxone Injectable 0.1 milliGRAM(s) IV Push every 3 minutes PRN For ANY of the following changes in patient status:  A. RR LESS THAN 10 breaths per minute, B. Oxygen saturation LESS THAN 90%, C. Sedation score of 6  ondansetron Injectable 4 milliGRAM(s) IV Push every 6 hours PRN Nausea      OBJECTIVE:  Patient is sitting up in chair.    Sedation Score:	[ X] Alert	[ ] Drowsy 	[ ] Arousable	[ ] Asleep	[ ] Unresponsive    Side Effects:	[X ] None	[ ] Nausea	[ ] Vomiting	[ ] Pruritus  		[ ] Other:    Vital Signs Last 24 Hrs  T(C): 36.6 (12 Feb 2020 08:00), Max: 37.2 (12 Feb 2020 04:00)  T(F): 97.8 (12 Feb 2020 08:00), Max: 99 (12 Feb 2020 04:00)  HR: 87 (12 Feb 2020 08:00) (73 - 93)  BP: 147/69 (12 Feb 2020 08:00) (121/69 - 176/103)  BP(mean): 90 (12 Feb 2020 08:00) (82 - 125)  RR: 26 (12 Feb 2020 08:00) (14 - 26)  SpO2: 93% (12 Feb 2020 08:00) (90% - 97%)    ASSESSMENT/ PLAN    Therapy to  be:	[ X] Continue   [ ] Discontinued   [ ] Change to prn Analgesics    Documentation and Verification of current medications:   [X] Done	[ ] Not done, not elligible    Comments:  Continue current pain regimen, with po Tylenol.

## 2020-02-13 ENCOUNTER — TRANSCRIPTION ENCOUNTER (OUTPATIENT)
Age: 70
End: 2020-02-13

## 2020-02-13 LAB
ANION GAP SERPL CALC-SCNC: 14 MMO/L — SIGNIFICANT CHANGE UP (ref 7–14)
BUN SERPL-MCNC: 13 MG/DL — SIGNIFICANT CHANGE UP (ref 7–23)
CALCIUM SERPL-MCNC: 9.3 MG/DL — SIGNIFICANT CHANGE UP (ref 8.4–10.5)
CHLORIDE SERPL-SCNC: 99 MMOL/L — SIGNIFICANT CHANGE UP (ref 98–107)
CO2 SERPL-SCNC: 24 MMOL/L — SIGNIFICANT CHANGE UP (ref 22–31)
CREAT SERPL-MCNC: 0.95 MG/DL — SIGNIFICANT CHANGE UP (ref 0.5–1.3)
GLUCOSE BLDC GLUCOMTR-MCNC: 137 MG/DL — HIGH (ref 70–99)
GLUCOSE BLDC GLUCOMTR-MCNC: 137 MG/DL — HIGH (ref 70–99)
GLUCOSE BLDC GLUCOMTR-MCNC: 142 MG/DL — HIGH (ref 70–99)
GLUCOSE BLDC GLUCOMTR-MCNC: 183 MG/DL — HIGH (ref 70–99)
GLUCOSE SERPL-MCNC: 167 MG/DL — HIGH (ref 70–99)
HCT VFR BLD CALC: 40.1 % — SIGNIFICANT CHANGE UP (ref 39–50)
HGB BLD-MCNC: 12.3 G/DL — LOW (ref 13–17)
MAGNESIUM SERPL-MCNC: 2.2 MG/DL — SIGNIFICANT CHANGE UP (ref 1.6–2.6)
MCHC RBC-ENTMCNC: 26.3 PG — LOW (ref 27–34)
MCHC RBC-ENTMCNC: 30.7 % — LOW (ref 32–36)
MCV RBC AUTO: 85.7 FL — SIGNIFICANT CHANGE UP (ref 80–100)
NRBC # FLD: 0 K/UL — SIGNIFICANT CHANGE UP (ref 0–0)
PHOSPHATE SERPL-MCNC: 2.3 MG/DL — LOW (ref 2.5–4.5)
PLATELET # BLD AUTO: 282 K/UL — SIGNIFICANT CHANGE UP (ref 150–400)
PMV BLD: 10.3 FL — SIGNIFICANT CHANGE UP (ref 7–13)
POTASSIUM SERPL-MCNC: 3.8 MMOL/L — SIGNIFICANT CHANGE UP (ref 3.5–5.3)
POTASSIUM SERPL-SCNC: 3.8 MMOL/L — SIGNIFICANT CHANGE UP (ref 3.5–5.3)
RBC # BLD: 4.68 M/UL — SIGNIFICANT CHANGE UP (ref 4.2–5.8)
RBC # FLD: 14.1 % — SIGNIFICANT CHANGE UP (ref 10.3–14.5)
SODIUM SERPL-SCNC: 137 MMOL/L — SIGNIFICANT CHANGE UP (ref 135–145)
WBC # BLD: 12.71 K/UL — HIGH (ref 3.8–10.5)
WBC # FLD AUTO: 12.71 K/UL — HIGH (ref 3.8–10.5)

## 2020-02-13 PROCEDURE — 99233 SBSQ HOSP IP/OBS HIGH 50: CPT

## 2020-02-13 PROCEDURE — 71045 X-RAY EXAM CHEST 1 VIEW: CPT | Mod: 26,76

## 2020-02-13 RX ORDER — POTASSIUM PHOSPHATE, MONOBASIC POTASSIUM PHOSPHATE, DIBASIC 236; 224 MG/ML; MG/ML
15 INJECTION, SOLUTION INTRAVENOUS ONCE
Refills: 0 | Status: COMPLETED | OUTPATIENT
Start: 2020-02-13 | End: 2020-02-13

## 2020-02-13 RX ORDER — OXYCODONE HYDROCHLORIDE 5 MG/1
10 TABLET ORAL EVERY 4 HOURS
Refills: 0 | Status: DISCONTINUED | OUTPATIENT
Start: 2020-02-13 | End: 2020-02-14

## 2020-02-13 RX ORDER — OXYCODONE HYDROCHLORIDE 5 MG/1
5 TABLET ORAL EVERY 4 HOURS
Refills: 0 | Status: DISCONTINUED | OUTPATIENT
Start: 2020-02-13 | End: 2020-02-14

## 2020-02-13 RX ORDER — ACETAMINOPHEN 500 MG
650 TABLET ORAL EVERY 6 HOURS
Refills: 0 | Status: DISCONTINUED | OUTPATIENT
Start: 2020-02-13 | End: 2020-02-13

## 2020-02-13 RX ORDER — POTASSIUM PHOSPHATE, MONOBASIC POTASSIUM PHOSPHATE, DIBASIC 236; 224 MG/ML; MG/ML
15 INJECTION, SOLUTION INTRAVENOUS ONCE
Refills: 0 | Status: DISCONTINUED | OUTPATIENT
Start: 2020-02-13 | End: 2020-02-13

## 2020-02-13 RX ADMIN — Medication 145 MILLIGRAM(S): at 22:01

## 2020-02-13 RX ADMIN — Medication 650 MILLIGRAM(S): at 18:42

## 2020-02-13 RX ADMIN — HEPARIN SODIUM 5000 UNIT(S): 5000 INJECTION INTRAVENOUS; SUBCUTANEOUS at 05:51

## 2020-02-13 RX ADMIN — LISINOPRIL 2.5 MILLIGRAM(S): 2.5 TABLET ORAL at 05:51

## 2020-02-13 RX ADMIN — OXYCODONE HYDROCHLORIDE 5 MILLIGRAM(S): 5 TABLET ORAL at 12:30

## 2020-02-13 RX ADMIN — ALBUTEROL 2.5 MILLIGRAM(S): 90 AEROSOL, METERED ORAL at 22:36

## 2020-02-13 RX ADMIN — Medication 650 MILLIGRAM(S): at 18:02

## 2020-02-13 RX ADMIN — HEPARIN SODIUM 5000 UNIT(S): 5000 INJECTION INTRAVENOUS; SUBCUTANEOUS at 22:01

## 2020-02-13 RX ADMIN — OXYCODONE HYDROCHLORIDE 5 MILLIGRAM(S): 5 TABLET ORAL at 13:00

## 2020-02-13 RX ADMIN — Medication 81 MILLIGRAM(S): at 12:27

## 2020-02-13 RX ADMIN — POTASSIUM PHOSPHATE, MONOBASIC POTASSIUM PHOSPHATE, DIBASIC 62.5 MILLIMOLE(S): 236; 224 INJECTION, SOLUTION INTRAVENOUS at 06:07

## 2020-02-13 RX ADMIN — SENNA PLUS 2 TABLET(S): 8.6 TABLET ORAL at 22:01

## 2020-02-13 RX ADMIN — SODIUM CHLORIDE 4 MILLILITER(S): 9 INJECTION INTRAMUSCULAR; INTRAVENOUS; SUBCUTANEOUS at 22:45

## 2020-02-13 RX ADMIN — ALBUTEROL 2.5 MILLIGRAM(S): 90 AEROSOL, METERED ORAL at 04:40

## 2020-02-13 RX ADMIN — HYDROMORPHONE HYDROCHLORIDE 30 MILLILITER(S): 2 INJECTION INTRAMUSCULAR; INTRAVENOUS; SUBCUTANEOUS at 07:16

## 2020-02-13 RX ADMIN — ATORVASTATIN CALCIUM 20 MILLIGRAM(S): 80 TABLET, FILM COATED ORAL at 22:01

## 2020-02-13 RX ADMIN — HEPARIN SODIUM 5000 UNIT(S): 5000 INJECTION INTRAVENOUS; SUBCUTANEOUS at 12:27

## 2020-02-13 RX ADMIN — ALBUTEROL 2.5 MILLIGRAM(S): 90 AEROSOL, METERED ORAL at 10:32

## 2020-02-13 NOTE — PROGRESS NOTE ADULT - SUBJECTIVE AND OBJECTIVE BOX
ANABELLE MASON            MRN-[id number]         No Known Allergies                 HPI:  69 year old male with hx carotid artery disease, s/p right carotidectomy on 1/8/2020. Pt states on workup for carotid surgery pt had imaging done which revealed abnormality in left lung. Pt presents today for presurgical evaluation for Flexible Bronchoscopy, Left Video Assisted Thoracoscopy, Left Lower Lobe Wedge Resection, Possible Lobectomy scheduled on 2/11/2020. (05 Feb 2020 17:36)      Procedure: LVATS, LLL Lobectomy, MLND       Issues:  Lung nodule  Postop pain  HTN  HLD  DM-2  Hx of TIA               Home Medications:  Aspir 81 oral delayed release tablet: 1 tab(s) orally once a day (at bedtime) (11 Feb 2020 06:36)  atorvastatin 20 mg oral tablet: 1 tab(s) orally once a day (at bedtime) (11 Feb 2020 06:36)  fenofibrate 160 mg oral tablet: 1 tab(s) orally once a day (at bedtime) (11 Feb 2020 06:36)  hydroCHLOROthiazide 12.5 mg oral capsule: 1 cap(s) orally once a day (at bedtime) (11 Feb 2020 06:36)  metFORMIN 500 mg oral tablet: 1 tab(s) orally 2 times a day (lunch and dinner) (11 Feb 2020 06:36)  ramipril 5 mg oral tablet: 1 tab(s) orally once a day (at bedtime) (11 Feb 2020 06:36)  Vitamin C 1000 mg oral tablet: 1 tab(s) orally once a day (11 Feb 2020 06:36)      PAST MEDICAL & SURGICAL HISTORY:  HTN (hypertension)  Carotid artery disease  Bladder tumor: bladder instillations, TURBT  TIA (transient ischemic attack): 7/2019  Diabetes mellitus  Hyperlipidemia  Spermatocele: s/p surgery  S/P carotid endarterectomy: 1/2020  History of hydrocelectomy: right  S/P hernia repair: bilateral inguinal hernia repair  S/P cystoscopy: transurethral resection of bladder tumor x 2 (2012, 2019)        ICU Vital Signs Last 24 Hrs  T(C): 37 (13 Feb 2020 04:00), Max: 37 (13 Feb 2020 00:00)  T(F): 98.6 (13 Feb 2020 04:00), Max: 98.6 (13 Feb 2020 00:00)  HR: 88 (13 Feb 2020 05:00) (77 - 99)  BP: 138/68 (13 Feb 2020 05:00) (113/52 - 166/81)  BP(mean): 88 (13 Feb 2020 05:00) (71 - 107)  ABP: --  ABP(mean): --  RR: 25 (13 Feb 2020 05:00) (18 - 26)  SpO2: 95% (13 Feb 2020 05:00) (91% - 98%)    I&O's Detail    11 Feb 2020 07:01  -  12 Feb 2020 07:00  --------------------------------------------------------  IN:    lactated ringers.: 330 mL    Oral Fluid: 240 mL  Total IN: 570 mL    OUT:    Chest Tube: 120 mL    Voided: 600 mL  Total OUT: 720 mL    Total NET: -150 mL      12 Feb 2020 07:01  -  13 Feb 2020 05:07  --------------------------------------------------------  IN:    lactated ringers.: 30 mL    Oral Fluid: 820 mL  Total IN: 850 mL    OUT:    Chest Tube: 180 mL    Voided: 2700 mL  Total OUT: 2880 mL    Total NET: -2030 mL        CAPILLARY BLOOD GLUCOSE      POCT Blood Glucose.: 176 mg/dL (12 Feb 2020 21:06)      Home Medications:  Aspir 81 oral delayed release tablet: 1 tab(s) orally once a day (at bedtime) (11 Feb 2020 06:36)  atorvastatin 20 mg oral tablet: 1 tab(s) orally once a day (at bedtime) (11 Feb 2020 06:36)  fenofibrate 160 mg oral tablet: 1 tab(s) orally once a day (at bedtime) (11 Feb 2020 06:36)  hydroCHLOROthiazide 12.5 mg oral capsule: 1 cap(s) orally once a day (at bedtime) (11 Feb 2020 06:36)  metFORMIN 500 mg oral tablet: 1 tab(s) orally 2 times a day (lunch and dinner) (11 Feb 2020 06:36)  ramipril 5 mg oral tablet: 1 tab(s) orally once a day (at bedtime) (11 Feb 2020 06:36)  Vitamin C 1000 mg oral tablet: 1 tab(s) orally once a day (11 Feb 2020 06:36)      MEDICATIONS  (STANDING):  acetaminophen   Tablet .. 650 milliGRAM(s) Oral every 6 hours  ALBUTerol    0.083% 2.5 milliGRAM(s) Nebulizer every 6 hours  aspirin enteric coated 81 milliGRAM(s) Oral daily  atorvastatin 20 milliGRAM(s) Oral at bedtime  dextrose 50% Injectable 25 Gram(s) IV Push once  fenofibrate Tablet 145 milliGRAM(s) Oral at bedtime  heparin  Injectable 5000 Unit(s) SubCutaneous every 8 hours  HYDROmorphone PCA (1 mG/mL) 30 milliLiter(s) PCA Continuous PCA Continuous  insulin lispro (HumaLOG) corrective regimen sliding scale   SubCutaneous three times a day before meals  lisinopril 2.5 milliGRAM(s) Oral daily  senna 2 Tablet(s) Oral at bedtime  sodium chloride 3%  Inhalation 4 milliLiter(s) Inhalation every 8 hours    MEDICATIONS  (PRN):  diphenhydrAMINE   Injectable 25 milliGRAM(s) IV Push every 4 hours PRN Pruritus  HYDROmorphone PCA (1 mG/mL) Rescue Clinician Bolus 0.5 milliGRAM(s) IV Push every 15 minutes PRN for Pain Scale GREATER THAN 6  naloxone Injectable 0.1 milliGRAM(s) IV Push every 3 minutes PRN For ANY of the following changes in patient status:  A. RR LESS THAN 10 breaths per minute, B. Oxygen saturation LESS THAN 90%, C. Sedation score of 6  ondansetron Injectable 4 milliGRAM(s) IV Push every 6 hours PRN Nausea          Physical exam:                             General:               Pt is awake, alert,  not in any distress                                                  Neuro:                  Nonfocal                             Cardiovascular:   S1 & S2, regular                           Respiratory:         Air entry is fair and equal on both sides, has bilateral conducted sounds                           GI:                          Soft, nondistended and nontender, Bowel sounds active                            Ext:                        No cyanosis or edema     Labs:                                                                           12.3   12.71 )-----------( 282      ( 13 Feb 2020 02:15 )             40.1             02-13    137  |  99  |  13  ----------------------------<  167<H>  3.8   |  24  |  0.95    Ca    9.3      13 Feb 2020 02:15  Phos  2.3     02-13  Mg     2.2     02-13                    PT/INR - ( 11 Feb 2020 13:20 )   PT: 11.2 SEC;   INR: 0.98          PTT - ( 11 Feb 2020 13:20 )  PTT:29.4 SEC      CXR:    < from: Xray Chest 1 View AP/PA (02.12.20 @ 06:12) >  Left-sidedchest tube in place unchanged. Opacity at the right lung base likely postop atelectasis. Remainder of both lungs are clear. No pneumothorax.      COMPARISON:  February 11      IMPRESSION:  Status post left thoracotomy with chest tube.      Plan:    General: 69yMale s/p LVATS, LLL Lobectomy, MLND   , experiencing  pain with deep breathing.                             Neuro:                                         Pain control with   Tylenol / Oxycodone    Hx of TIA: Continue ASA                            Cardiovascular:                                          Continue hemodynamic monitoring.     Pt c/o atypical chest pain yesterday morning with minor ST-T  changes compared to preop EKG. Troponin borderline positive  No chest pain overnight                            Respiratory:                                         Pt is on 2L  nasal canula, wean off as tolerated                                          Comfortable, not in any distress.                                         Encourage incentive spirometry                                          Monitor chest tube output                                         Chest tube to  water seal                                                                  Continue bronchodilators, pulmonary toilet                            GI                                         On low cholesterol / diabetic  diet as tolerated                                         Continue Zofran / Reglan for nausea - PRN	                                                                 Renal:                                         Monitor I/Os and electrolytes                                                                                        Hem/ Onc:                                                                                  Monitor chest tube output &  signs of bleeding.                                          Follow CBC in AM                           Infectious disease:                                            No signs of infection. Monitor for fever / leukocytosis.                                          All surgical incision / chest tube  sites look clean                            Endocrine                                            DM-2: Continue Accu-Checks with coverage. Restart Metformin    Pt is on SQ Heparin and Venodyne boots for DVT prophylaxis.     Pertinent clinical, laboratory, radiographic, hemodynamic, echocardiographic, respiratory data, microbiologic data and chart were reviewed and analyzed frequently throughout the course of the day and night  Patient seen, examined and plan discussed with CT Surgeon Dr. Ordonez / CTICU team during rounds.    Pt's status discussed with family at bedside, updated status              Rene Shelton MD

## 2020-02-13 NOTE — DIETITIAN INITIAL EVALUATION ADULT. - OTHER INFO
69 year old male with hx carotid artery disease, s/p right carotidectomy on 1/8/2020. Pt states on workup for carotid surgery pt had imaging done which revealed abnormality in left lung. Pt s/p Flexible Bronchoscopy, Left Video Assisted Thoracoscopy, Left Lower Lobe Wedge Resection,  LVATS, LLL Lobectomy, MLND . Pt. presented alert, oriented , tolerating PO diet, able to take > 75% of the meals , denies recent wt. loss, allergies to food, difficulty chewing , swallowing . Pt. reports following low salt diet prior to admission.

## 2020-02-13 NOTE — PROGRESS NOTE ADULT - SUBJECTIVE AND OBJECTIVE BOX
Anesthesia Pain Management Service    SUBJECTIVE: Patient is doing well with IV PCA and no significant problems reported.    Pain Scale Score	At rest: _3__ 	With Activity: ___ 	[X ] Refer to charted pain scores    THERAPY:    [ ] IV PCA Morphine		[ ] 5 mg/mL	[ ] 1 mg/mL  [X ] IV PCA Hydromorphone	[ ] 5 mg/mL	[X ] 1 mg/mL  [ ] IV PCA Fentanyl		[ ] 50 micrograms/mL    Demand dose __0.2_ lockout __6_ (minutes) Continuous Rate _0__ Total: __3.8_  mg used (in past 24 hours)      MEDICATIONS  (STANDING):  acetaminophen   Tablet .. 650 milliGRAM(s) Oral every 6 hours  ALBUTerol    0.083% 2.5 milliGRAM(s) Nebulizer every 6 hours  aspirin enteric coated 81 milliGRAM(s) Oral daily  atorvastatin 20 milliGRAM(s) Oral at bedtime  dextrose 50% Injectable 25 Gram(s) IV Push once  fenofibrate Tablet 145 milliGRAM(s) Oral at bedtime  heparin  Injectable 5000 Unit(s) SubCutaneous every 8 hours  HYDROmorphone PCA (1 mG/mL) 30 milliLiter(s) PCA Continuous PCA Continuous  insulin lispro (HumaLOG) corrective regimen sliding scale   SubCutaneous three times a day before meals  lisinopril 2.5 milliGRAM(s) Oral daily  senna 2 Tablet(s) Oral at bedtime  sodium chloride 3%  Inhalation 4 milliLiter(s) Inhalation every 8 hours    MEDICATIONS  (PRN):  diphenhydrAMINE   Injectable 25 milliGRAM(s) IV Push every 4 hours PRN Pruritus  HYDROmorphone PCA (1 mG/mL) Rescue Clinician Bolus 0.5 milliGRAM(s) IV Push every 15 minutes PRN for Pain Scale GREATER THAN 6  naloxone Injectable 0.1 milliGRAM(s) IV Push every 3 minutes PRN For ANY of the following changes in patient status:  A. RR LESS THAN 10 breaths per minute, B. Oxygen saturation LESS THAN 90%, C. Sedation score of 6  ondansetron Injectable 4 milliGRAM(s) IV Push every 6 hours PRN Nausea  polyethylene glycol 3350 17 Gram(s) Oral daily PRN Constipation      OBJECTIVE: sitting in chair     Sedation Score:	[ X] Alert	[ ] Drowsy 	[ ] Arousable	[ ] Asleep	[ ] Unresponsive    Side Effects:	[X ] None	[ ] Nausea	[ ] Vomiting	[ ] Pruritus  		[ ] Other:    Vital Signs Last 24 Hrs  T(C): 36.9 (13 Feb 2020 08:00), Max: 37 (13 Feb 2020 00:00)  T(F): 98.4 (13 Feb 2020 08:00), Max: 98.6 (13 Feb 2020 00:00)  HR: 97 (13 Feb 2020 10:00) (80 - 101)  BP: 143/67 (13 Feb 2020 09:00) (113/52 - 165/75)  BP(mean): 88 (13 Feb 2020 09:00) (71 - 107)  RR: 29 (13 Feb 2020 10:00) (18 - 29)  SpO2: 93% (13 Feb 2020 10:00) (91% - 98%)    ASSESSMENT/ PLAN    Therapy to  be:	[ ] Continue   [X ] Discontinued   [ X] Change to prn Analgesics    Documentation and Verification of current medications:   [X] Done	[ ] Not done, not elligible    Comments: D/C IVPCA, switching to oral PRN analgesics    Progress Note written now but Patient was seen earlier.

## 2020-02-13 NOTE — PROGRESS NOTE ADULT - SUBJECTIVE AND OBJECTIVE BOX
Anesthesia Pain Management Service- Attending Addendum    SUBJECTIVE: Patient's pain control adequate    Therapy:	  [ X] IV PCA	   [ ] Epidural           [ ] s/p Spinal Opoid              [ ] Postpartum infusion	  [ ] Patient controlled regional anesthesia (PCRA)    [ ] prn Analgesics    Allergies    No Known Allergies    Intolerances      MEDICATIONS  (STANDING):  acetaminophen   Tablet .. 650 milliGRAM(s) Oral every 6 hours  ALBUTerol    0.083% 2.5 milliGRAM(s) Nebulizer every 6 hours  aspirin enteric coated 81 milliGRAM(s) Oral daily  atorvastatin 20 milliGRAM(s) Oral at bedtime  dextrose 50% Injectable 25 Gram(s) IV Push once  fenofibrate Tablet 145 milliGRAM(s) Oral at bedtime  heparin  Injectable 5000 Unit(s) SubCutaneous every 8 hours  insulin lispro (HumaLOG) corrective regimen sliding scale   SubCutaneous three times a day before meals  lisinopril 2.5 milliGRAM(s) Oral daily  senna 2 Tablet(s) Oral at bedtime  sodium chloride 3%  Inhalation 4 milliLiter(s) Inhalation every 8 hours    MEDICATIONS  (PRN):  diphenhydrAMINE   Injectable 25 milliGRAM(s) IV Push every 4 hours PRN Pruritus  ondansetron Injectable 4 milliGRAM(s) IV Push every 6 hours PRN Nausea  oxyCODONE    IR 5 milliGRAM(s) Oral every 4 hours PRN Moderate Pain (4 - 6)  oxyCODONE    IR 10 milliGRAM(s) Oral every 4 hours PRN Severe Pain (7 - 10)  polyethylene glycol 3350 17 Gram(s) Oral daily PRN Constipation      OBJECTIVE:   [X] No new signs     [ ] Other:    Side Effects:  [X ] None			[ ] Other:      ASSESSMENT/PLAN  -Discontinue current therapy    [ ] Therapy changed to:    [ ] IV PCA       [ ] Epidural     [ X] prn Analgesics     Comments: Pain management per primary team, APS to sign off

## 2020-02-14 ENCOUNTER — TRANSCRIPTION ENCOUNTER (OUTPATIENT)
Age: 70
End: 2020-02-14

## 2020-02-14 VITALS
OXYGEN SATURATION: 95 % | HEART RATE: 91 BPM | SYSTOLIC BLOOD PRESSURE: 149 MMHG | TEMPERATURE: 100 F | DIASTOLIC BLOOD PRESSURE: 70 MMHG | RESPIRATION RATE: 18 BRPM

## 2020-02-14 LAB
ANION GAP SERPL CALC-SCNC: 12 MMO/L — SIGNIFICANT CHANGE UP (ref 7–14)
BUN SERPL-MCNC: 16 MG/DL — SIGNIFICANT CHANGE UP (ref 7–23)
CALCIUM SERPL-MCNC: 9.4 MG/DL — SIGNIFICANT CHANGE UP (ref 8.4–10.5)
CHLORIDE SERPL-SCNC: 102 MMOL/L — SIGNIFICANT CHANGE UP (ref 98–107)
CO2 SERPL-SCNC: 25 MMOL/L — SIGNIFICANT CHANGE UP (ref 22–31)
CREAT SERPL-MCNC: 0.93 MG/DL — SIGNIFICANT CHANGE UP (ref 0.5–1.3)
GLUCOSE BLDC GLUCOMTR-MCNC: 121 MG/DL — HIGH (ref 70–99)
GLUCOSE SERPL-MCNC: 148 MG/DL — HIGH (ref 70–99)
HCT VFR BLD CALC: 37.4 % — LOW (ref 39–50)
HGB BLD-MCNC: 11.6 G/DL — LOW (ref 13–17)
MAGNESIUM SERPL-MCNC: 2.1 MG/DL — SIGNIFICANT CHANGE UP (ref 1.6–2.6)
MCHC RBC-ENTMCNC: 26.7 PG — LOW (ref 27–34)
MCHC RBC-ENTMCNC: 31 % — LOW (ref 32–36)
MCV RBC AUTO: 86 FL — SIGNIFICANT CHANGE UP (ref 80–100)
NRBC # FLD: 0 K/UL — SIGNIFICANT CHANGE UP (ref 0–0)
PHOSPHATE SERPL-MCNC: 2 MG/DL — LOW (ref 2.5–4.5)
PLATELET # BLD AUTO: 271 K/UL — SIGNIFICANT CHANGE UP (ref 150–400)
PMV BLD: 10.9 FL — SIGNIFICANT CHANGE UP (ref 7–13)
POTASSIUM SERPL-MCNC: 3.7 MMOL/L — SIGNIFICANT CHANGE UP (ref 3.5–5.3)
POTASSIUM SERPL-SCNC: 3.7 MMOL/L — SIGNIFICANT CHANGE UP (ref 3.5–5.3)
RBC # BLD: 4.35 M/UL — SIGNIFICANT CHANGE UP (ref 4.2–5.8)
RBC # FLD: 13.8 % — SIGNIFICANT CHANGE UP (ref 10.3–14.5)
SODIUM SERPL-SCNC: 139 MMOL/L — SIGNIFICANT CHANGE UP (ref 135–145)
WBC # BLD: 10.44 K/UL — SIGNIFICANT CHANGE UP (ref 3.8–10.5)
WBC # FLD AUTO: 10.44 K/UL — SIGNIFICANT CHANGE UP (ref 3.8–10.5)

## 2020-02-14 PROCEDURE — 71045 X-RAY EXAM CHEST 1 VIEW: CPT | Mod: 26

## 2020-02-14 RX ORDER — SENNA PLUS 8.6 MG/1
2 TABLET ORAL
Qty: 0 | Refills: 0 | DISCHARGE
Start: 2020-02-14

## 2020-02-14 RX ORDER — ACETAMINOPHEN 500 MG
2 TABLET ORAL
Qty: 0 | Refills: 0 | DISCHARGE

## 2020-02-14 RX ORDER — TRAMADOL HYDROCHLORIDE 50 MG/1
1 TABLET ORAL
Qty: 30 | Refills: 0
Start: 2020-02-14 | End: 2020-02-18

## 2020-02-14 RX ORDER — POLYETHYLENE GLYCOL 3350 17 G/17G
17 POWDER, FOR SOLUTION ORAL
Qty: 0 | Refills: 0 | DISCHARGE
Start: 2020-02-14

## 2020-02-14 RX ADMIN — HEPARIN SODIUM 5000 UNIT(S): 5000 INJECTION INTRAVENOUS; SUBCUTANEOUS at 05:44

## 2020-02-14 RX ADMIN — ALBUTEROL 2.5 MILLIGRAM(S): 90 AEROSOL, METERED ORAL at 04:05

## 2020-02-14 RX ADMIN — LISINOPRIL 2.5 MILLIGRAM(S): 2.5 TABLET ORAL at 05:44

## 2020-02-14 NOTE — DISCHARGE NOTE PROVIDER - NSDCFUADDAPPT_GEN_ALL_CORE_FT
See Dr Ordonez in 1 1/2 to 2 weeks - call for an appointment and bring a new chest X-ray when you come. See Dr Ordonez in 1 1/2 to 2 weeks - call for an appointment [phone] and bring a new chest X-ray when you come.

## 2020-02-14 NOTE — DISCHARGE NOTE PROVIDER - NSDCCPTREATMENT_GEN_ALL_CORE_FT
PRINCIPAL PROCEDURE  Procedure: Thorascopic resection of left lung  Findings and Treatment: LVATS, LLL Lobectomy, MLND

## 2020-02-14 NOTE — DISCHARGE NOTE PROVIDER - HOSPITAL COURSE
This 69 year old male with h/o carotid artery disease underwent a right CEA on 1/8/2020. As part of his pre-op work up, imaging revealed a left lung abnormality. On 2/11/20, he underwent a L VATS, LLL Lobectomy, MLND. His chest tube was removed on 2/13 and     he was discharged home the next day. This 69 year old male with h/o carotid artery disease underwent a right CEA on 1/8/2020. As part of his pre-op work up, imaging revealed a left lung abnormality. On 2/11/20, he underwent a L VATS, LLL Lobectomy, MLND. His chest tube was removed on 2/13 and     he was discharged home the next day. FU CXR x 2 stable. Pt feels well. Minimal pain. No CP or SOB. Ambulating without difficulty. VATS c/d/i.     Cleared for d/c to home by Dr. Ordonez    Vital Signs Last 24 Hrs    T(C): 37.1 (14 Feb 2020 05:13), Max: 37.1 (14 Feb 2020 05:13)    T(F): 98.8 (14 Feb 2020 05:13), Max: 98.8 (14 Feb 2020 05:13)    HR: 69 (14 Feb 2020 05:13) (69 - 98)    BP: 125/66 (14 Feb 2020 05:13) (121/62 - 153/65)    BP(mean): 79 (13 Feb 2020 13:00) (79 - 88)    RR: 18 (14 Feb 2020 05:13) (18 - 24)    SpO2: 98% (14 Feb 2020 05:13) (92% - 98%)

## 2020-02-14 NOTE — DISCHARGE NOTE PROVIDER - NSDCMRMEDTOKEN_GEN_ALL_CORE_FT
Aspir 81 oral delayed release tablet: 1 tab(s) orally once a day (at bedtime)  atorvastatin 20 mg oral tablet: 1 tab(s) orally once a day (at bedtime)  fenofibrate 160 mg oral tablet: 1 tab(s) orally once a day (at bedtime)  hydroCHLOROthiazide 12.5 mg oral capsule: 1 cap(s) orally once a day (at bedtime)  metFORMIN 500 mg oral tablet: 1 tab(s) orally 2 times a day (lunch and dinner)  ramipril 5 mg oral tablet: 1 tab(s) orally once a day (at bedtime)  Vitamin C 1000 mg oral tablet: 1 tab(s) orally once a day Aspir 81 oral delayed release tablet: 1 tab(s) orally once a day (at bedtime)  atorvastatin 20 mg oral tablet: 1 tab(s) orally once a day (at bedtime)  fenofibrate 160 mg oral tablet: 1 tab(s) orally once a day (at bedtime)  hydroCHLOROthiazide 12.5 mg oral capsule: 1 cap(s) orally once a day (at bedtime)  metFORMIN 500 mg oral tablet: 1 tab(s) orally 2 times a day (lunch and dinner)  polyethylene glycol 3350 oral powder for reconstitution: 17 gram(s) orally once a day, As needed, Constipation  ramipril 5 mg oral tablet: 1 tab(s) orally once a day (at bedtime)  senna oral tablet: 2 tab(s) orally once a day (at bedtime)  traMADol 50 mg oral tablet: 1 tab(s) orally every 4 hours, As Needed moderate to severe pain. MDD:6  Tylenol 325 mg oral tablet: 2 tab(s) orally every 6 hours, As Needed  Vitamin C 1000 mg oral tablet: 1 tab(s) orally once a day

## 2020-02-14 NOTE — DISCHARGE NOTE NURSING/CASE MANAGEMENT/SOCIAL WORK - NSDCFUADDAPPT_GEN_ALL_CORE_FT
See Dr Ordonez in 1 1/2 to 2 weeks - call for an appointment [phone] and bring a new chest X-ray when you come.

## 2020-02-14 NOTE — DISCHARGE NOTE PROVIDER - CARE PROVIDER_API CALL
Jordin Ordonez)  Surgery; Thoracic Surgery  [address] [place] [address] [place] [address]  Phone: [phone]  Fax: [phone]  Follow Up Time:

## 2020-02-14 NOTE — DISCHARGE NOTE PROVIDER - NSDCFUADDINST_GEN_ALL_CORE_FT
Keep the wound clean with soap and water; If pus, increasing redness, fevers, or difficulty breathing  are noticed, call    Dr Ordonez Keep the wound clean with soap and water; If pus, increasing redness, fevers, or difficulty breathing  are noticed, call    Dr Ordonez  Walk frequently. You may climb stairs. Continue to use incentive spirometer.   You may keep all wounds uncovered and can shower daily. Suture will be removed in office.

## 2020-02-14 NOTE — DISCHARGE NOTE PROVIDER - NSDCACTIVITY_GEN_ALL_CORE
Do not drive or operate machinery/Return to Work/School allowed/Sex allowed/Showering allowed/Do not make important decisions/Walking - Indoors allowed/No heavy lifting/straining/Stairs allowed/Walking - Outdoors allowed Do not drive or operate machinery/Stairs allowed/Walking - Indoors allowed/Walking - Outdoors allowed/Sex allowed/Do not make important decisions/No heavy lifting/straining/Showering allowed

## 2020-02-14 NOTE — DISCHARGE NOTE NURSING/CASE MANAGEMENT/SOCIAL WORK - NSDCPEWEB_GEN_ALL_CORE
[facility] for Tobacco Control website --- [email or web address] website --- [email or web address]

## 2020-02-14 NOTE — DISCHARGE NOTE NURSING/CASE MANAGEMENT/SOCIAL WORK - NSDCPEEMAIL_GEN_ALL_CORE
[facility] for Tobacco Control email [email or web address]

## 2020-02-14 NOTE — DISCHARGE NOTE NURSING/CASE MANAGEMENT/SOCIAL WORK - PATIENT PORTAL LINK FT
You can access the FollowMyHealth Patient Portal offered by [facility] by registering at the following website: [email or web address]. By joining [employer]’s FollowMyHealth portal, you will also be able to view your health information using other applications (apps) compatible with our system.

## 2020-02-14 NOTE — DISCHARGE NOTE NURSING/CASE MANAGEMENT/SOCIAL WORK - NSDCPNINST_GEN_ALL_CORE
Call 911 for chest pain, and/or difficulty breathing. Report to your doctor any fever, pus to your incision.

## 2020-02-14 NOTE — DISCHARGE NOTE PROVIDER - NSDCFUSCHEDAPPT_GEN_ALL_CORE_FT
ANABELLE MASON ; 03/30/2020 ; [facility] [address] ANABELLE Conrad ; 03/30/2020 ; [facility] [address]
6

## 2020-02-18 ENCOUNTER — RX RENEWAL (OUTPATIENT)
Age: 70
End: 2020-02-18

## 2020-02-19 ENCOUNTER — APPOINTMENT (OUTPATIENT)
Dept: RADIOLOGY | Facility: CLINIC | Age: 70
End: 2020-02-19
Payer: MEDICARE

## 2020-02-19 ENCOUNTER — OUTPATIENT (OUTPATIENT)
Dept: OUTPATIENT SERVICES | Facility: HOSPITAL | Age: 70
LOS: 1 days | End: 2020-02-19
Payer: MEDICARE

## 2020-02-19 DIAGNOSIS — Z98.890 OTHER SPECIFIED POSTPROCEDURAL STATES: Chronic | ICD-10-CM

## 2020-02-19 DIAGNOSIS — Z00.8 ENCOUNTER FOR OTHER GENERAL EXAMINATION: ICD-10-CM

## 2020-02-19 DIAGNOSIS — N43.40 SPERMATOCELE OF EPIDIDYMIS, UNSPECIFIED: Chronic | ICD-10-CM

## 2020-02-19 PROBLEM — D49.4 NEOPLASM OF UNSPECIFIED BEHAVIOR OF BLADDER: Chronic | Status: ACTIVE | Noted: 2020-01-03

## 2020-02-19 PROBLEM — I10 ESSENTIAL (PRIMARY) HYPERTENSION: Chronic | Status: ACTIVE | Noted: 2020-02-05

## 2020-02-19 PROBLEM — I73.9 PERIPHERAL VASCULAR DISEASE, UNSPECIFIED: Chronic | Status: ACTIVE | Noted: 2020-02-05

## 2020-02-19 LAB — SURGICAL PATHOLOGY STUDY: SIGNIFICANT CHANGE UP

## 2020-02-19 PROCEDURE — 71046 X-RAY EXAM CHEST 2 VIEWS: CPT | Mod: 26

## 2020-02-19 PROCEDURE — 71046 X-RAY EXAM CHEST 2 VIEWS: CPT

## 2020-02-26 ENCOUNTER — APPOINTMENT (OUTPATIENT)
Dept: THORACIC SURGERY | Facility: CLINIC | Age: 70
End: 2020-02-26
Payer: MEDICARE

## 2020-02-26 VITALS
BODY MASS INDEX: 30.16 KG/M2 | HEART RATE: 96 BPM | WEIGHT: 199 LBS | SYSTOLIC BLOOD PRESSURE: 157 MMHG | HEIGHT: 68 IN | DIASTOLIC BLOOD PRESSURE: 74 MMHG | OXYGEN SATURATION: 95 %

## 2020-02-26 PROCEDURE — 99024 POSTOP FOLLOW-UP VISIT: CPT

## 2020-03-09 ENCOUNTER — RX RENEWAL (OUTPATIENT)
Age: 70
End: 2020-03-09

## 2020-03-23 ENCOUNTER — RX RENEWAL (OUTPATIENT)
Age: 70
End: 2020-03-23

## 2020-03-30 ENCOUNTER — APPOINTMENT (OUTPATIENT)
Dept: VASCULAR SURGERY | Facility: CLINIC | Age: 70
End: 2020-03-30
Payer: MEDICARE

## 2020-03-30 VITALS — TEMPERATURE: 98.1 F

## 2020-03-30 PROCEDURE — 93880 EXTRACRANIAL BILAT STUDY: CPT

## 2020-03-30 PROCEDURE — 99024 POSTOP FOLLOW-UP VISIT: CPT

## 2020-04-02 ENCOUNTER — APPOINTMENT (OUTPATIENT)
Dept: OTOLARYNGOLOGY | Facility: CLINIC | Age: 70
End: 2020-04-02
Payer: MEDICARE

## 2020-04-02 VITALS
HEIGHT: 68 IN | WEIGHT: 199 LBS | DIASTOLIC BLOOD PRESSURE: 90 MMHG | SYSTOLIC BLOOD PRESSURE: 150 MMHG | BODY MASS INDEX: 30.16 KG/M2

## 2020-04-02 PROCEDURE — 99204 OFFICE O/P NEW MOD 45 MIN: CPT

## 2020-04-02 RX ORDER — OSELTAMIVIR PHOSPHATE 75 MG/1
75 CAPSULE ORAL
Qty: 10 | Refills: 0 | Status: COMPLETED | COMMUNITY
Start: 2020-02-18 | End: 2020-04-02

## 2020-04-02 NOTE — PHYSICAL EXAM
[de-identified] : R CEA scar [Midline] : trachea located in midline position [de-identified] : No mass on palpation [de-identified] : No masss on palpation [Normal] : no rashes

## 2020-04-02 NOTE — CONSULT LETTER
[Dear  ___] : Dear  [unfilled], [Consult Letter:] : I had the pleasure of evaluating your patient, [unfilled]. [Please see my note below.] : Please see my note below. [Consult Closing:] : Thank you very much for allowing me to participate in the care of this patient.  If you have any questions, please do not hesitate to contact me. [Sincerely,] : Sincerely, [FreeTextEntry2] : Jordin Ordonez MD ([facility] physician)\par  [FreeTextEntry3] : Jose Rafael Cm MD, FACS\par Chief of Otolaryngology - [facility]\par  - [facility]\par & Dept. of Otolaryngology and Head & Neck Surgery\par  [DrBlas  ___] : Dr. TORRES

## 2020-04-02 NOTE — REASON FOR VISIT
[Initial Consultation] : an initial consultation for [FreeTextEntry2] : consult for pet scan findings in tonsils

## 2020-04-02 NOTE — DATA REVIEWED
[de-identified] : PET/CT - 1/2020 - Images reviewed and show symmetric bilateral tonsillar uptake with no visualized mass on CT.

## 2020-04-02 NOTE — ASSESSMENT
[FreeTextEntry1] : Pt has no evidence of a tonsillar mass on exam.  Observation is warranted.  Pt to f/u with me in 3 months for a recheck.

## 2020-04-02 NOTE — REVIEW OF SYSTEMS
[Post Nasal Drip] : post nasal drip [Hearing Loss] : hearing loss [Throat Clearing] : throat clearing [Negative] : Heme/Lymph

## 2020-04-02 NOTE — HISTORY OF PRESENT ILLNESS
[de-identified] : 68 y/o M with a h/o R carotid stenosis for which he underwent a R CEA 1/8/20.  As pt was being cleared for the CEZ he had a CXR that showed a possible lung lesion.  His subsequent w/u identified a L lung CA,  H underwent a L lower lobectomy for AdenoCA.  All nodes were negative.  As part of his w/u for the lung CA he had a PET/CT that showed uptake in the tonsils.

## 2020-04-06 ENCOUNTER — APPOINTMENT (OUTPATIENT)
Dept: OTOLARYNGOLOGY | Facility: CLINIC | Age: 70
End: 2020-04-06

## 2020-04-09 ENCOUNTER — RX RENEWAL (OUTPATIENT)
Age: 70
End: 2020-04-09

## 2020-06-08 ENCOUNTER — RX RENEWAL (OUTPATIENT)
Age: 70
End: 2020-06-08

## 2020-06-19 ENCOUNTER — RX RENEWAL (OUTPATIENT)
Age: 70
End: 2020-06-19

## 2020-06-22 ENCOUNTER — RESULT CHARGE (OUTPATIENT)
Age: 70
End: 2020-06-22

## 2020-06-23 ENCOUNTER — NON-APPOINTMENT (OUTPATIENT)
Age: 70
End: 2020-06-23

## 2020-06-23 ENCOUNTER — APPOINTMENT (OUTPATIENT)
Dept: INTERNAL MEDICINE | Facility: CLINIC | Age: 70
End: 2020-06-23
Payer: MEDICARE

## 2020-06-23 VITALS
OXYGEN SATURATION: 97 % | SYSTOLIC BLOOD PRESSURE: 180 MMHG | DIASTOLIC BLOOD PRESSURE: 82 MMHG | TEMPERATURE: 98.2 F | HEIGHT: 68 IN | BODY MASS INDEX: 30.62 KG/M2 | HEART RATE: 71 BPM | RESPIRATION RATE: 16 BRPM | WEIGHT: 202 LBS

## 2020-06-23 VITALS — DIASTOLIC BLOOD PRESSURE: 82 MMHG | SYSTOLIC BLOOD PRESSURE: 160 MMHG

## 2020-06-23 DIAGNOSIS — Z20.828 CONTACT WITH AND (SUSPECTED) EXPOSURE TO OTHER VIRAL COMMUNICABLE DISEASES: ICD-10-CM

## 2020-06-23 DIAGNOSIS — R93.89 ABNORMAL FINDINGS ON DIAGNOSTIC IMAGING OF OTHER SPECIFIED BODY STRUCTURES: ICD-10-CM

## 2020-06-23 DIAGNOSIS — Z86.39 PERSONAL HISTORY OF OTHER ENDOCRINE, NUTRITIONAL AND METABOLIC DISEASE: ICD-10-CM

## 2020-06-23 DIAGNOSIS — R94.31 ABNORMAL ELECTROCARDIOGRAM [ECG] [EKG]: ICD-10-CM

## 2020-06-23 DIAGNOSIS — J90 PLEURAL EFFUSION, NOT ELSEWHERE CLASSIFIED: ICD-10-CM

## 2020-06-23 LAB
BILIRUB UR QL STRIP: NEGATIVE
CLARITY UR: CLEAR
COLLECTION METHOD: NORMAL
GLUCOSE UR-MCNC: NEGATIVE
HCG UR QL: 0.2 EU/DL
HGB UR QL STRIP.AUTO: NEGATIVE
KETONES UR-MCNC: NEGATIVE
LEUKOCYTE ESTERASE UR QL STRIP: NEGATIVE
NITRITE UR QL STRIP: NEGATIVE
PH UR STRIP: 5
PROT UR STRIP-MCNC: NEGATIVE
SP GR UR STRIP: 1.03

## 2020-06-23 PROCEDURE — 81003 URINALYSIS AUTO W/O SCOPE: CPT | Mod: QW

## 2020-06-23 PROCEDURE — G0439: CPT

## 2020-06-23 PROCEDURE — 82270 OCCULT BLOOD FECES: CPT

## 2020-06-23 PROCEDURE — 36415 COLL VENOUS BLD VENIPUNCTURE: CPT

## 2020-06-23 PROCEDURE — G0444 DEPRESSION SCREEN ANNUAL: CPT | Mod: 59

## 2020-06-23 PROCEDURE — 93000 ELECTROCARDIOGRAM COMPLETE: CPT | Mod: 59

## 2020-06-23 NOTE — HISTORY OF PRESENT ILLNESS
[FreeTextEntry1] : Yearly physical  [de-identified] : Mr. MASON is a 69 year  male, who present to the office for CPX.  States he drove up from [place] yesterday.  States he feels well overall.  Has been healthy over the winter months.  States he does get left side chest/ rib pain when he moves in a certain direction.  Has a follow up with Pulmonology July 2nd.  \par \par Denies having a diabetic eye exam.\par States BP and BG levels at home have been good

## 2020-06-23 NOTE — DATA REVIEWED
[No studies available for review at this time.] : No studies available for review at this time. [FreeTextEntry1] : \par \par \par

## 2020-06-23 NOTE — HISTORY OF PRESENT ILLNESS
[FreeTextEntry1] : Yearly physical  [de-identified] : Mr. MASON is a 69 year  male, who present to the office for CPX.  States he drove up from [place] yesterday.  States he feels well overall.  Has been healthy over the winter months.  States he does get left side chest/ rib pain when he moves in a certain direction.  Has a follow up with Pulmonology July 2nd.  \par \par Denies having a diabetic eye exam.\par States BP and BG levels at home have been good

## 2020-06-23 NOTE — PLAN
[Patient/representative verbalized importance of medical follow up with PCP/other specialist after hospital discharge] : Patient/representative verbalized importance of medical follow up with PCP/other specialist after hospital discharge [Follow up appointment scheduled - Enter Date of Appointment:___] : Follow up appointment scheduled. Date of appointment: [unfilled] [Specialist(s) appointment needed] : Patient expressed need for specialist(s) [FreeTextEntry2] : Vascular Dr. Nance\par Pulmonology Dr. Mcmahon\par  [FreeTextEntry1] : Cardio - Carotid stenosis - s/p  CEA - Abnormal EKG - Patient is currently doing well.  Has mild elevation of his systolic blood pressure.  Advised to increase Altace to 10mg and low na diet.  recheck BP in 10 days.  Monitor BP at home.  Follow up with vascular.  Advised side effects to the medication. Advised goal of LDL less than 70 \par Continue with statin therapy.  Check labs.  \par Reviewed prior EKG and stress test \par If chest discomfort continue and not related to pulmonary surgery advised to follow back up with Dr. Lopez \par Pulm - Lung  Mass s/p resection-  follow up w  pulmonology.  Has schedule APT 7/20\par \par GI - call for recent colonoscopy report.  see scan\par \par Endocrinology\par NIDDM - continue Metformin HCl 500 bid . - continue low carbohydrate/low sugar diet - check hemoglobin A1C and microalbumin.  Advised to become more active \par \par hyperlipidemia/hypertriglyceridemia - continue Fenofibrate 160 mg p.o.q.d. and Lipitor ad directed - continue low cholesterol/low fat diet - check labs. refilled medication \par \par vitamin D insufficiency - continue vitamin D tablets p.o.q.d. - check vitamin D\par \par Immunization Flu and  Prevnar 13 - discussed benefits of receiving the vaccine in detail.  PT still declining vax \par \par We discussed the importance of healthy lifestyles which include exercise, weight control and good diet.\par Patient's questions were answered in full detail. Advise patient if any other concerns arise to please call office to have a discussion. \par \par Patient  education  - COVID-19   Counseling and education provided to the patient.  Advised sign and symptoms of the virus.  Advised contact precautions.  Educated patient on proper hand washing and to participate in social distancing. . \par \par Patient is in full awareness of the plan and agrees to it.  All pt question was answered.  \par \par

## 2020-06-23 NOTE — COUNSELING
[Fall prevention counseling provided] : Fall prevention counseling provided [AUDIT-C Screening administered and reviewed] : AUDIT-C Screening administered and reviewed [Decrease Portions] : decrease portions [Potential consequences of obesity discussed] : Potential consequences of obesity discussed [Encouraged to increase physical activity] : Encouraged to increase physical activity [Good understanding] : Patient has a good understanding of disease, goals and obesity follow-up plan [FreeTextEntry2] : ADA diet, low fat and low sodium  [de-identified] : Low na diet \par Low fat diet

## 2020-06-23 NOTE — DISCUSSION/SUMMARY
[FreeTextEntry1] : Hospital Course:\par Discharge Date 09-Jan-2020\par Admission Date 08-Jan-2020 11:48\par Reason for Admission CEA\par Hospital Course \par This is a 68 y/o male with probable TIA 7/2019 , carotid doppler and MRA\par revealed + right carotid stenosis, he presents today for right carotid\par endarterectomy.\par \par 1/9/2020- Patient underwent Right carotid endarterectomy with dacron patch.\par The patient tolerated the procedure well without complications, was extubated,\par and transferred to the PACU in stable condition. In the PACU, the patients'\par pain was controlled, vitals stable, . The patient was transferred to the\par surgical floor in stable condition.\par \par POD#1 Patients pugh discontinued, and was able to void freely. His BERNICE was also\par removed. On day of discharge, the patient was tolerating diet, ambulating well\par and pain well controlled. Patient will be discharged home with outpatient\par follow up with Dr. Nance within 1-2 weeks.\par \par \par Pt. s/p R. carotid endarterectomy. Spoke w/ pt.'s wife, Ayse, over the phone. She states patient is doing very well, with minimal pain. Pt. offers complaint of a DURHAM this morning. Ayse states that the incision site is C/D/I, no abnormal discharge, swelling, or bruising. Site is secured w/ steri strips. Ayse denies any medication changes at this time. Pt. was not discharged with any new prescriptions. Pt. has a scheduled f/u in 2 weeks w/ Dr. Nance, surgeon. Ayse denies wanting to f/u in our office at this time. She was advised to call our office with any questions or concerns. Will update SHANELL Barkley.

## 2020-06-23 NOTE — COUNSELING
[AUDIT-C Screening administered and reviewed] : AUDIT-C Screening administered and reviewed [Fall prevention counseling provided] : Fall prevention counseling provided [Encouraged to increase physical activity] : Encouraged to increase physical activity [Potential consequences of obesity discussed] : Potential consequences of obesity discussed [Decrease Portions] : decrease portions [Good understanding] : Patient has a good understanding of disease, goals and obesity follow-up plan [FreeTextEntry2] : ADA diet, low fat and low sodium  [de-identified] : Low na diet \par Low fat diet

## 2020-06-23 NOTE — PHYSICAL EXAM
[No Acute Distress] : no acute distress [Well Developed] : well developed [Well Nourished] : well nourished [Well-Appearing] : well-appearing [Normal Sclera/Conjunctiva] : normal sclera/conjunctiva [PERRL] : pupils equal round and reactive to light [EOMI] : extraocular movements intact [Normal Oropharynx] : the oropharynx was normal [Normal Outer Ear/Nose] : the outer ears and nose were normal in appearance [Normal TMs] : both tympanic membranes were normal [No JVD] : no jugular venous distention [No Lymphadenopathy] : no lymphadenopathy [Thyroid Normal, No Nodules] : the thyroid was normal and there were no nodules present [No Respiratory Distress] : no respiratory distress  [Supple] : supple [Clear to Auscultation] : lungs were clear to auscultation bilaterally [No Accessory Muscle Use] : no accessory muscle use [Normal S1, S2] : normal S1 and S2 [Regular Rhythm] : with a regular rhythm [Normal Rate] : normal rate  [No Carotid Bruits] : no carotid bruits [No Abdominal Bruit] : a ~M bruit was not heard ~T in the abdomen [Pedal Pulses Present] : the pedal pulses are present [No Edema] : there was no peripheral edema [No Palpable Aorta] : no palpable aorta [No Extremity Clubbing/Cyanosis] : no extremity clubbing/cyanosis [Soft] : abdomen soft [Non Tender] : non-tender [Non-distended] : non-distended [No Masses] : no abdominal mass palpated [No HSM] : no HSM [Normal Bowel Sounds] : normal bowel sounds [Normal Posterior Cervical Nodes] : no posterior cervical lymphadenopathy [Normal Anterior Cervical Nodes] : no anterior cervical lymphadenopathy [No CVA Tenderness] : no CVA  tenderness [No Spinal Tenderness] : no spinal tenderness [No Joint Swelling] : no joint swelling [Grossly Normal Strength/Tone] : grossly normal strength/tone [No Focal Deficits] : no focal deficits [Coordination Grossly Intact] : coordination grossly intact [No Rash] : no rash [Normal Gait] : normal gait [Deep Tendon Reflexes (DTR)] : deep tendon reflexes were 2+ and symmetric [Speech Grossly Normal] : speech grossly normal [Normal Affect] : the affect was normal [Alert and Oriented x3] : oriented to person, place, and time [Normal Mood] : the mood was normal [Normal Insight/Judgement] : insight and judgment were intact [Penis Abnormality] : normal circumcised penis [Urethral Meatus] : meatus normal [Rectal Exam - Seminal Vesicles] : the seminal vesicles were normal [Scrotum] : the scrotum was normal [Urinary Bladder Findings] : the bladder was normal on palpation [Testes Tenderness] : no tenderness of the testes [Rectal Exam - Rectum] : digital rectal exam was normal [Epididymis] : the epididymides were normal [Prostate Tenderness] : the prostate was not tender [Right Foot Was Examined] : Right foot ~C was examined [Left Foot Was Examined] : left foot ~C was examined [] : both feet [Stool Occult Blood] : stool negative for occult blood [de-identified] : with murmur  2-3/6 [FreeTextEntry1] : 1 plus enlargement  of the prostate no nodule noted  [de-identified] : obese [TWNoteComboBox3] : +2 [TWNoteComboBox4] : +2

## 2020-06-23 NOTE — PHYSICAL EXAM
[No Acute Distress] : no acute distress [Well Nourished] : well nourished [Well Developed] : well developed [Normal Sclera/Conjunctiva] : normal sclera/conjunctiva [Well-Appearing] : well-appearing [EOMI] : extraocular movements intact [PERRL] : pupils equal round and reactive to light [Normal Oropharynx] : the oropharynx was normal [Normal Outer Ear/Nose] : the outer ears and nose were normal in appearance [Normal TMs] : both tympanic membranes were normal [No Lymphadenopathy] : no lymphadenopathy [No JVD] : no jugular venous distention [No Respiratory Distress] : no respiratory distress  [Supple] : supple [Thyroid Normal, No Nodules] : the thyroid was normal and there were no nodules present [No Accessory Muscle Use] : no accessory muscle use [Clear to Auscultation] : lungs were clear to auscultation bilaterally [Normal Rate] : normal rate  [Normal S1, S2] : normal S1 and S2 [Regular Rhythm] : with a regular rhythm [No Carotid Bruits] : no carotid bruits [No Abdominal Bruit] : a ~M bruit was not heard ~T in the abdomen [No Palpable Aorta] : no palpable aorta [Pedal Pulses Present] : the pedal pulses are present [No Edema] : there was no peripheral edema [No Extremity Clubbing/Cyanosis] : no extremity clubbing/cyanosis [Soft] : abdomen soft [Non Tender] : non-tender [Non-distended] : non-distended [No Masses] : no abdominal mass palpated [No HSM] : no HSM [Normal Posterior Cervical Nodes] : no posterior cervical lymphadenopathy [Normal Bowel Sounds] : normal bowel sounds [No Spinal Tenderness] : no spinal tenderness [Normal Anterior Cervical Nodes] : no anterior cervical lymphadenopathy [No CVA Tenderness] : no CVA  tenderness [No Joint Swelling] : no joint swelling [Grossly Normal Strength/Tone] : grossly normal strength/tone [No Rash] : no rash [No Focal Deficits] : no focal deficits [Coordination Grossly Intact] : coordination grossly intact [Normal Gait] : normal gait [Speech Grossly Normal] : speech grossly normal [Deep Tendon Reflexes (DTR)] : deep tendon reflexes were 2+ and symmetric [Normal Affect] : the affect was normal [Alert and Oriented x3] : oriented to person, place, and time [Normal Mood] : the mood was normal [Normal Insight/Judgement] : insight and judgment were intact [Penis Abnormality] : normal circumcised penis [Urethral Meatus] : meatus normal [Scrotum] : the scrotum was normal [Rectal Exam - Seminal Vesicles] : the seminal vesicles were normal [Urinary Bladder Findings] : the bladder was normal on palpation [Testes Tenderness] : no tenderness of the testes [Epididymis] : the epididymides were normal [Rectal Exam - Rectum] : digital rectal exam was normal [Prostate Tenderness] : the prostate was not tender [Right Foot Was Examined] : Right foot ~C was examined [Left Foot Was Examined] : left foot ~C was examined [] : with full ROM [Stool Occult Blood] : stool negative for occult blood [FreeTextEntry1] : 1 plus enlargement  of the prostate no nodule noted  [de-identified] : obese [de-identified] : with murmur  2-3/6 [TWNoteComboBox4] : +2 [TWNoteComboBox3] : +2

## 2020-06-23 NOTE — ASSESSMENT
[FreeTextEntry1] : Patient is a 69 year old male with a past medical history as above who presents for CPX

## 2020-06-23 NOTE — HEALTH RISK ASSESSMENT
[Independent] : feeding [Yes] : Yes [Monthly or less (1 pt)] : Monthly or less (1 point) [1 or 2 (0 pts)] : 1 or 2 (0 points) [Never (0 pts)] : Never (0 points) [No] : In the past 12 months have you used drugs other than those required for medical reasons? No [No falls in past year] : Patient reported no falls in the past year [0] : 2) Feeling down, depressed, or hopeless: Not at all (0) [Patient reported colonoscopy was abnormal] : Patient reported colonoscopy was abnormal [Very Good] : ~his/her~  mood as very good [Designated Healthcare Proxy] : Designated healthcare proxy [Relationship: ___] : Relationship: [unfilled] [Name: ___] : Health Care Proxy's Name: [unfilled]  [] : No [de-identified] : ADA diet  [Audit-CScore] : 1 [de-identified] : quit  [HPR3Rtwyp] : 0 [ColonoscopyDate] : 12/19 [ColonoscopyComments] : colon polyp  repeat ? [AdvancecareDate] : 06/20

## 2020-06-23 NOTE — HEALTH RISK ASSESSMENT
[Independent] : feeding [Yes] : Yes [Monthly or less (1 pt)] : Monthly or less (1 point) [1 or 2 (0 pts)] : 1 or 2 (0 points) [Never (0 pts)] : Never (0 points) [No] : In the past 12 months have you used drugs other than those required for medical reasons? No [No falls in past year] : Patient reported no falls in the past year [0] : 2) Feeling down, depressed, or hopeless: Not at all (0) [Patient reported colonoscopy was abnormal] : Patient reported colonoscopy was abnormal [Very Good] : ~his/her~  mood as very good [Designated Healthcare Proxy] : Designated healthcare proxy [Relationship: ___] : Relationship: [unfilled] [Name: ___] : Health Care Proxy's Name: [unfilled]  [] : No [de-identified] : ADA diet  [Audit-CScore] : 1 [de-identified] : quit  [[id number]] : 0 [ColonoscopyDate] : 12/19 [AdvancecareDate] : 06/20 [ColonoscopyComments] : colon polyp  repeat ?

## 2020-06-24 ENCOUNTER — RESULT REVIEW (OUTPATIENT)
Age: 70
End: 2020-06-24

## 2020-06-24 ENCOUNTER — APPOINTMENT (OUTPATIENT)
Dept: CT IMAGING | Facility: CLINIC | Age: 70
End: 2020-06-24
Payer: MEDICARE

## 2020-06-24 ENCOUNTER — OUTPATIENT (OUTPATIENT)
Dept: OUTPATIENT SERVICES | Facility: HOSPITAL | Age: 70
LOS: 1 days | End: 2020-06-24
Payer: MEDICARE

## 2020-06-24 DIAGNOSIS — Z98.890 OTHER SPECIFIED POSTPROCEDURAL STATES: Chronic | ICD-10-CM

## 2020-06-24 DIAGNOSIS — C34.90 MALIGNANT NEOPLASM OF UNSPECIFIED PART OF UNSPECIFIED BRONCHUS OR LUNG: ICD-10-CM

## 2020-06-24 DIAGNOSIS — N43.40 SPERMATOCELE OF EPIDIDYMIS, UNSPECIFIED: Chronic | ICD-10-CM

## 2020-06-24 PROCEDURE — 71250 CT THORAX DX C-: CPT

## 2020-06-24 PROCEDURE — 71250 CT THORAX DX C-: CPT | Mod: 26

## 2020-07-01 ENCOUNTER — RESULT CHARGE (OUTPATIENT)
Age: 70
End: 2020-07-01

## 2020-07-01 ENCOUNTER — APPOINTMENT (OUTPATIENT)
Dept: THORACIC SURGERY | Facility: CLINIC | Age: 70
End: 2020-07-01
Payer: MEDICARE

## 2020-07-01 LAB
25(OH)D3 SERPL-MCNC: 64.5 NG/ML
ALBUMIN SERPL ELPH-MCNC: 4.6 G/DL
ALP BLD-CCNC: 68 U/L
ALT SERPL-CCNC: 83 U/L
ANION GAP SERPL CALC-SCNC: 14 MMOL/L
AST SERPL-CCNC: 62 U/L
BASOPHILS # BLD AUTO: 0.07 K/UL
BASOPHILS NFR BLD AUTO: 0.8 %
BILIRUB SERPL-MCNC: 0.3 MG/DL
BUN SERPL-MCNC: 16 MG/DL
CALCIUM SERPL-MCNC: 10.1 MG/DL
CHLORIDE SERPL-SCNC: 102 MMOL/L
CHOLEST SERPL-MCNC: 190 MG/DL
CHOLEST/HDLC SERPL: 5.7 RATIO
CO2 SERPL-SCNC: 23 MMOL/L
CREAT SERPL-MCNC: 1 MG/DL
CREAT SPEC-SCNC: 202 MG/DL
EOSINOPHIL # BLD AUTO: 0.51 K/UL
EOSINOPHIL NFR BLD AUTO: 5.5 %
ESTIMATED AVERAGE GLUCOSE: 157 MG/DL
GLUCOSE SERPL-MCNC: 139 MG/DL
HBA1C MFR BLD HPLC: 7.1 %
HCT VFR BLD CALC: 47 %
HDLC SERPL-MCNC: 33 MG/DL
HGB BLD-MCNC: 13.6 G/DL
IMM GRANULOCYTES NFR BLD AUTO: 0.2 %
LDLC SERPL CALC-MCNC: 125 MG/DL
LYMPHOCYTES # BLD AUTO: 2.28 K/UL
LYMPHOCYTES NFR BLD AUTO: 24.5 %
MAN DIFF?: NORMAL
MCHC RBC-ENTMCNC: 25.5 PG
MCHC RBC-ENTMCNC: 28.9 GM/DL
MCV RBC AUTO: 88 FL
MICROALBUMIN 24H UR DL<=1MG/L-MCNC: 1.6 MG/DL
MICROALBUMIN/CREAT 24H UR-RTO: 8 MG/G
MONOCYTES # BLD AUTO: 0.61 K/UL
MONOCYTES NFR BLD AUTO: 6.6 %
NEUTROPHILS # BLD AUTO: 5.82 K/UL
NEUTROPHILS NFR BLD AUTO: 62.4 %
PLATELET # BLD AUTO: 333 K/UL
POTASSIUM SERPL-SCNC: 4.4 MMOL/L
PROT SERPL-MCNC: 7.7 G/DL
PSA SERPL-MCNC: 0.3 NG/ML
RBC # BLD: 5.34 M/UL
RBC # FLD: 14.6 %
SODIUM SERPL-SCNC: 139 MMOL/L
TRIGL SERPL-MCNC: 160 MG/DL
TSH SERPL-ACNC: 1.14 UIU/ML
WBC # FLD AUTO: 9.31 K/UL

## 2020-07-01 PROCEDURE — 99214 OFFICE O/P EST MOD 30 MIN: CPT

## 2020-07-02 ENCOUNTER — APPOINTMENT (OUTPATIENT)
Dept: OTOLARYNGOLOGY | Facility: CLINIC | Age: 70
End: 2020-07-02

## 2020-07-03 ENCOUNTER — APPOINTMENT (OUTPATIENT)
Dept: DISASTER EMERGENCY | Facility: CLINIC | Age: 70
End: 2020-07-03

## 2020-07-06 ENCOUNTER — APPOINTMENT (OUTPATIENT)
Dept: PULMONOLOGY | Facility: CLINIC | Age: 70
End: 2020-07-06
Payer: MEDICARE

## 2020-07-06 VITALS
SYSTOLIC BLOOD PRESSURE: 147 MMHG | OXYGEN SATURATION: 97 % | HEIGHT: 68 IN | WEIGHT: 202 LBS | DIASTOLIC BLOOD PRESSURE: 80 MMHG | BODY MASS INDEX: 30.62 KG/M2 | HEART RATE: 88 BPM | RESPIRATION RATE: 14 BRPM

## 2020-07-06 LAB — SARS-COV-2 N GENE NPH QL NAA+PROBE: NOT DETECTED

## 2020-07-06 PROCEDURE — 88738 HGB QUANT TRANSCUTANEOUS: CPT

## 2020-07-06 PROCEDURE — ZZZZZ: CPT

## 2020-07-06 PROCEDURE — 94010 BREATHING CAPACITY TEST: CPT

## 2020-07-06 PROCEDURE — 99214 OFFICE O/P EST MOD 30 MIN: CPT | Mod: 25

## 2020-07-06 PROCEDURE — 94664 DEMO&/EVAL PT USE INHALER: CPT

## 2020-07-06 PROCEDURE — 94729 DIFFUSING CAPACITY: CPT

## 2020-07-06 PROCEDURE — 94726 PLETHYSMOGRAPHY LUNG VOLUMES: CPT

## 2020-07-06 NOTE — PROCEDURE
[FreeTextEntry1] : PFT: Normal spirometry without a significant bronchodilator response.  Lung volumes normal. DLCO normal.  Slight reduction in FVC and FEV1 from prior to VATS.\par \par \par Reviewed:\par \par Pathology --2/2020-adenoca of lung\par \par EXAM: CT CHEST \par \par \par PROCEDURE DATE: 06/24/2020 \par \par INTERPRETATION: EXAMINATION: CT CHEST \par \par CLINICAL INDICATION: Lung cancer. \par \par TECHNIQUE: Noncontrast CT of the chest was obtained. \par \par COMPARISON: 1/2/2020. \par \par FINDINGS: \par \par AIRWAYS AND LUNGS: Left lower lobe wedge resection with associated \par postsurgical changes. Emphysema. Scattered 2 mm nodules bilaterally are \par unchanged. \par \par MEDIASTINUM AND PLEURA: There are no enlarged mediastinal, hilar or axillary \par lymph nodes. The visualized portion of the thyroid gland is unremarkable. \par There is no pleural effusion. There is no pneumothorax. \par \par HEART AND VESSELS: The heart is normal in size. There are atherosclerotic \par calcifications of the aorta and coronary arteries. There is no pericardial \par effusion. \par \par UPPER ABDOMEN: Images of the upper abdomen demonstrate no abnormality. \par \par BONES AND SOFT TISSUES: The bones are unremarkable. The soft tissues are \par unremarkable. \par \par TUBES/LINES: None. \par \par IMPRESSION: \par Left lower lobe wedge resection with associated postsurgical changes. \par Emphysema. Scattered 2 mm nodules bilaterally are unchanged. \par \par

## 2020-07-06 NOTE — HISTORY OF PRESENT ILLNESS
[TextBox_4] : 69M hx of stage 1 lung ca sp resection Feb.  here for pulmonary clearance prior to bladder surgery on 7/13.  Patient reports doing well post VATS, has been walking 2 miles daily, denies dyspnea, cp, cough.  No recent illness.  Not using any inhalers. No issues with prior anesthesia.  No history of LUIS, has some snoring, no witnessed apneas, no gasping/choking, well rested in am. [Former] : former

## 2020-07-07 ENCOUNTER — APPOINTMENT (OUTPATIENT)
Dept: INTERNAL MEDICINE | Facility: CLINIC | Age: 70
End: 2020-07-07
Payer: MEDICARE

## 2020-07-07 ENCOUNTER — NON-APPOINTMENT (OUTPATIENT)
Age: 70
End: 2020-07-07

## 2020-07-07 ENCOUNTER — APPOINTMENT (OUTPATIENT)
Dept: CARDIOLOGY | Facility: CLINIC | Age: 70
End: 2020-07-07
Payer: MEDICARE

## 2020-07-07 VITALS
HEIGHT: 68 IN | BODY MASS INDEX: 30.62 KG/M2 | DIASTOLIC BLOOD PRESSURE: 80 MMHG | TEMPERATURE: 97.9 F | OXYGEN SATURATION: 98 % | WEIGHT: 202 LBS | SYSTOLIC BLOOD PRESSURE: 172 MMHG | HEART RATE: 69 BPM | RESPIRATION RATE: 14 BRPM

## 2020-07-07 VITALS
SYSTOLIC BLOOD PRESSURE: 180 MMHG | DIASTOLIC BLOOD PRESSURE: 80 MMHG | OXYGEN SATURATION: 94 % | HEART RATE: 89 BPM | HEIGHT: 68 IN | WEIGHT: 202 LBS | BODY MASS INDEX: 30.62 KG/M2

## 2020-07-07 DIAGNOSIS — Z01.818 ENCOUNTER FOR OTHER PREPROCEDURAL EXAMINATION: ICD-10-CM

## 2020-07-07 DIAGNOSIS — G45.9 TRANSIENT CEREBRAL ISCHEMIC ATTACK, UNSPECIFIED: ICD-10-CM

## 2020-07-07 PROCEDURE — 99214 OFFICE O/P EST MOD 30 MIN: CPT

## 2020-07-07 PROCEDURE — 93000 ELECTROCARDIOGRAM COMPLETE: CPT

## 2020-07-07 PROCEDURE — 99214 OFFICE O/P EST MOD 30 MIN: CPT | Mod: 25

## 2020-07-07 PROCEDURE — 81003 URINALYSIS AUTO W/O SCOPE: CPT | Mod: QW

## 2020-07-07 PROCEDURE — 36415 COLL VENOUS BLD VENIPUNCTURE: CPT

## 2020-07-07 RX ORDER — MULTIVIT-MIN/FOLIC/VIT K/LYCOP 400-300MCG
1000 TABLET ORAL DAILY
Refills: 0 | Status: ACTIVE | COMMUNITY

## 2020-07-07 RX ORDER — ADHESIVE TAPE 3"X 2.3 YD
50 MCG TAPE, NON-MEDICATED TOPICAL
Qty: 30 | Refills: 0 | Status: ACTIVE | COMMUNITY

## 2020-07-07 NOTE — RESULTS/DATA
[de-identified] : Pending EKG, CBC,BMP, PT/INR, PTT, ABO, \par \par U/A noted WNL \par A!C  06/20 was 7.1

## 2020-07-07 NOTE — HISTORY OF PRESENT ILLNESS
[COPD] : COPD [Smoker] : smoker [Chronic Anticoagulation] : chronic anticoagulation [(Patient denies any chest pain, claudication, dyspnea on exertion, orthopnea, palpitations or syncope)] : Patient denies any chest pain, claudication, dyspnea on exertion, orthopnea, palpitations or syncope [Diabetes] : diabetes [Spouse] : spouse [Coronary Artery Disease] : no coronary artery disease [Aortic Stenosis] : no aortic stenosis [Atrial Fibrillation] : no atrial fibrillation [Implantable Device/Pacemaker] : no implantable device/pacemaker [Recent Myocardial Infarction] : no recent myocardial infarction [Asthma] : no asthma [Sleep Apnea] : no sleep apnea [Self] : no previous adverse anesthesia reaction [Family Member] : no family member with adverse anesthesia reaction/sudden death [Chronic Kidney Disease] : no chronic kidney disease [FreeTextEntry2] : 07/13/20 [FreeTextEntry1] : Cystoscopy [FreeTextEntry3] : Dr Oliver   [FreeTextEntry4] : Mr. MASON is a 69 year  male, who present to the office for medical clearance for cystoscopy and resection of a bladder tumor removed 07/13/20 with Dr. Oliver.  Procedure will be completed at [facility].  \par States he stopped ASA 5 days ago on his own. \par Seeing Dr. Lopez for cardiology clearance tonight.   \par Had pulmonary Clearance on 07/05/20 \par \par Patient states since his CEA surgery he feel well. Has been eating healthy.  States  his blood glucose readings at home is  between \par States he is schedule for Pre-Op testing in the am at the hospital.   [FreeTextEntry7] : Treadmill stress test on  01/02/20 was a negative study  [FreeTextEntry5] : Tobacco use quit 10 year ago,   Questionable TIA ,  S/p left lung resection

## 2020-07-07 NOTE — COUNSELING
[Weight management counseling provided] : Weight management [Good understanding] : Patient has a good understanding of disease, goals and obesity follow-up plan [Low Fat Diet] : Low fat diet [Low Salt Diet] : Low salt diet [Walking] : Walking [de-identified] : ADA diet 1800 frederic

## 2020-07-07 NOTE — PHYSICAL EXAM
[No Acute Distress] : no acute distress [Well Nourished] : well nourished [Well Developed] : well developed [Well-Appearing] : well-appearing [Normal Sclera/Conjunctiva] : normal sclera/conjunctiva [PERRL] : pupils equal round and reactive to light [EOMI] : extraocular movements intact [Normal Outer Ear/Nose] : the outer ears and nose were normal in appearance [Normal Oropharynx] : the oropharynx was normal [Normal TMs] : both tympanic membranes were normal [No JVD] : no jugular venous distention [No Lymphadenopathy] : no lymphadenopathy [Supple] : supple [Thyroid Normal, No Nodules] : the thyroid was normal and there were no nodules present [No Respiratory Distress] : no respiratory distress  [No Accessory Muscle Use] : no accessory muscle use [Normal Rate] : normal rate  [Regular Rhythm] : with a regular rhythm [Normal S1, S2] : normal S1 and S2 [No Murmur] : no murmur heard [No Abdominal Bruit] : a ~M bruit was not heard ~T in the abdomen [Pedal Pulses Present] : the pedal pulses are present [No Palpable Aorta] : no palpable aorta [No Edema] : there was no peripheral edema [No Extremity Clubbing/Cyanosis] : no extremity clubbing/cyanosis [Soft] : abdomen soft [Non Tender] : non-tender [Non-distended] : non-distended [No Masses] : no abdominal mass palpated [No HSM] : no HSM [Normal Bowel Sounds] : normal bowel sounds [Normal Posterior Cervical Nodes] : no posterior cervical lymphadenopathy [Normal Anterior Cervical Nodes] : no anterior cervical lymphadenopathy [No Spinal Tenderness] : no spinal tenderness [No CVA Tenderness] : no CVA  tenderness [No Joint Swelling] : no joint swelling [Grossly Normal Strength/Tone] : grossly normal strength/tone [No Rash] : no rash [Normal Gait] : normal gait [Coordination Grossly Intact] : coordination grossly intact [No Focal Deficits] : no focal deficits [Speech Grossly Normal] : speech grossly normal [Deep Tendon Reflexes (DTR)] : deep tendon reflexes were 2+ and symmetric [Normal Affect] : the affect was normal [Alert and Oriented x3] : oriented to person, place, and time [Normal Mood] : the mood was normal [Normal Insight/Judgement] : insight and judgment were intact [de-identified] : decrease breath sounds  [de-identified] : left bruit  [de-identified] : with murmur  2-3/6 [de-identified] : obese

## 2020-07-07 NOTE — ASSESSMENT
[Patient Optimized for Surgery] : Patient optimized for surgery [Cardiology consultation] : Cardiology consultation [Modify anti-platelet treatment prior to procedure] : Modify anti-platelet treatment prior to procedure [FreeTextEntry4] : A 69  year old male present to the office for medical clearance for bladder surgery  [FreeTextEntry6] : Hold ASA 7 day prior to procedure

## 2020-07-07 NOTE — PLAN
[FreeTextEntry1] : Discussed and reviewed  medical clearance with Dr. Arnoldo Yoo.  \par \par 1. Preoperative EKG is pending from cardio Dr Lopez \par 2. Pending labs drawn today 07/07/20 \par 3.  The patient was instructed to stop eating prior to midnight the evening before the procedure.\par 4. The patient was advised to stop medications 1 day prior to the procedure.\par 5. Hold ASA , Omega 3, Fish oil, MVI, Vitamin E 7 days prior to procedure \par 6.  Cash is medical cleared pending labs, cardiac clearance and covid pcr swab\par 7.  Continue with ramipril 10 mg.  Low na diet \par \par Pending  CBC, BMP, PT/INR PTT,  EKG , Blood type, and COVID PCR

## 2020-07-08 NOTE — DISCUSSION/SUMMARY
[FreeTextEntry1] : The patient is doing well without any signs or symptoms of active heart disease. He is status post carotid endarterectomy and left lower lobectomy. He has a normal stress test to work load of 9 mets 1/20. Echocardiogram shows no significant valvular heart disease. Denies any symptoms of chest pain or shortness of breath. His cardiac status is stable and there are no cardiac contraindications to planned cystoscopy. No special precautions other than routine hemodynamic monitoring should be required.

## 2020-07-08 NOTE — REVIEW OF SYSTEMS
[Eyeglasses] : currently wearing eyeglasses [Dizziness] : dizziness [Negative] : Genitourinary [Fever] : no fever [Chills] : no chills [Headache] : no headache [Recent Weight Gain (___ Lbs)] : no recent weight gain [Recent Weight Loss (___ Lbs)] : no recent weight loss [Blurry Vision] : no blurred vision [Feeling Fatigued] : not feeling fatigued [Seeing Double (Diplopia)] : no diplopia [Skin: A Rash] : no rash: [Joint Pain] : no joint pain [Depression] : no depression [Anxiety] : no anxiety [Excessive Thirst] : no polydipsia [Easy Bleeding] : no tendency for easy bleeding [Easy Bruising] : no tendency for easy bruising

## 2020-07-08 NOTE — PHYSICAL EXAM
[Well Groomed] : well groomed [General Appearance - Well Developed] : well developed [Normal Appearance] : normal appearance [General Appearance - Well Nourished] : well nourished [No Deformities] : no deformities [General Appearance - In No Acute Distress] : no acute distress [Normal Conjunctiva] : the conjunctiva exhibited no abnormalities [Normal Oral Mucosa] : normal oral mucosa [Normal Jugular Venous A Waves Present] : normal jugular venous A waves present [Normal Jugular Venous V Waves Present] : normal jugular venous V waves present [No Jugular Venous Anthony A Waves] : no jugular venous anthony A waves [Exaggerated Use Of Accessory Muscles For Inspiration] : no accessory muscle use [Respiration, Rhythm And Depth] : normal respiratory rhythm and effort [Bowel Sounds] : normal bowel sounds [Auscultation Breath Sounds / Voice Sounds] : lungs were clear to auscultation bilaterally [Abdomen Soft] : soft [Abdomen Tenderness] : non-tender [Abnormal Walk] : normal gait [Gait - Sufficient For Exercise Testing] : the gait was sufficient for exercise testing [Nail Clubbing] : no clubbing of the fingernails [Cyanosis, Localized] : no localized cyanosis [Skin Turgor] : normal skin turgor [Skin Color & Pigmentation] : normal skin color and pigmentation [] : no rash [Oriented To Time, Place, And Person] : oriented to person, place, and time [Impaired Insight] : insight and judgment were intact [No Anxiety] : not feeling anxious [Normal Rate] : normal [Normal S2] : normal S2 [Normal S1] : normal S1 [No Murmur] : no murmurs heard [2+] : Carotid: right 2+ [1+] : left 1+ [No Pitting Edema] : no pitting edema present [No Abnormalities] : the abdominal aorta was not enlarged and no bruit was heard [S4] : no S4 [Right Carotid Bruit] : no bruit heard over the right carotid [S3] : no S3 [Right Femoral Bruit] : no bruit heard over the right femoral artery [Left Carotid Bruit] : no bruit heard over the left carotid [Left Femoral Bruit] : no bruit heard over the left femoral artery

## 2020-07-08 NOTE — REASON FOR VISIT
[Follow-Up - Clinic] : a clinic follow-up of [Abnormal ECG] : an abnormal ECG [Carotid Artery Stenosis] : carotid stenosis [Hyperlipidemia] : hyperlipidemia [FreeTextEntry1] : Diabetes, TIA

## 2020-07-08 NOTE — HISTORY OF PRESENT ILLNESS
[FreeTextEntry1] : I saw Cash Jackson in the office today for cardiac follow up, In anticipation of cystoscopy to remove a bladder lesion. He has had recurrent bladder cancer.. He is a 69-year-old white male who has a history of a probable TIA approximately 5 months ago. He had transient numbness in his left arm.. Carotid Doppler showed significant disease. An MRA scan  showed a 90% plaque in the right coronary artery with mild plaque on the left side. He saw Dr Nance, a vascular surgeon and underwent right carotid endarterectomy.He was found to have stage I lung cancer and underwent successful left lower lobectomy.\par \par The patient has a history of hypertension. He stopped smoking approximately 10 years ago. he has hyperlipidemia. He has a family history of heart disease with his mother having a myocardial infarction at 70. He has recently diagnosed diabetes\par \par He is physically intact and has no chest pain or shortness of breath with physical exertion. He had an echocardiogram 12/19 that showed an ejection fraction of 62% with mild MR. Stress test 1/20 was normal workload of 9 mets. Blood work performed 6/20 demonstrates a cholesterol 190, triglycerides 160, HDL 33, and . A1c was 7.1.\par \par A resting 12-lead cardiogram demonstrates sinus rhythm. There is nonspecific T-wave negativity, in the lateral leads. No significant change.

## 2020-07-10 NOTE — HISTORY OF PRESENT ILLNESS
[FreeTextEntry1] : 70 year old male who is s/p Left lower lobe lobectomy for lung CA. The patient presents for follow up. the patient feels well no complaints. CT chest from 6/24/20 shows postsurgical changes with stable b/l 2mm nodules

## 2020-07-10 NOTE — CONSULT LETTER
[Consult Closing:] : Thank you very much for allowing me to participate in the care of this patient.  If you have any questions, please do not hesitate to contact me. [Courtesy Letter:] : I had the pleasure of seeing your patient, [unfilled], in my office today. [Sincerely,] : Sincerely, [FreeTextEntry3] : Jordin Ordonez MD

## 2020-07-10 NOTE — ASSESSMENT
[FreeTextEntry1] : 70 year old male s/p LLL lobectomy 2/11/20 for stage 1 non small cell lung CA. Pt's most recent CT of the chest is without evidence of recurrence. I have asked the patient to obtain a repeat CT of the Chest in 4 months and to follow up with me in my office at that time. I have answered all of his questions and he understands the importance of follow up.

## 2020-07-10 NOTE — PHYSICAL EXAM
[Sclera] : the sclera and conjunctiva were normal [Neck Appearance] : the appearance of the neck was normal [Auscultation Breath Sounds / Voice Sounds] : lungs were clear to auscultation bilaterally [] : the neck was supple [Bowel Sounds] : normal bowel sounds [Edema] : there was no peripheral edema [Heart Rate And Rhythm] : heart rate was normal and rhythm regular [Abdomen Soft] : soft [Cervical Lymph Nodes Enlarged Posterior Bilaterally] : posterior cervical [Supraclavicular Lymph Nodes Enlarged Bilaterally] : supraclavicular [Cervical Lymph Nodes Enlarged Anterior Bilaterally] : anterior cervical [No Spinal Tenderness] : no spinal tenderness [Nail Clubbing] : no clubbing  or cyanosis of the fingernails [No CVA Tenderness] : no ~M costovertebral angle tenderness [No Focal Deficits] : no focal deficits [Skin Color & Pigmentation] : normal skin color and pigmentation [Oriented To Time, Place, And Person] : oriented to person, place, and time

## 2020-07-13 LAB
ABO + RH PNL BLD: NORMAL
ALBUMIN SERPL ELPH-MCNC: 4.8 G/DL
ALP BLD-CCNC: 67 U/L
ALT SERPL-CCNC: 49 U/L
ANION GAP SERPL CALC-SCNC: 14 MMOL/L
APTT BLD: 39 SEC
AST SERPL-CCNC: 31 U/L
BASOPHILS # BLD AUTO: 0.06 K/UL
BASOPHILS NFR BLD AUTO: 0.6 %
BILIRUB SERPL-MCNC: <0.2 MG/DL
BUN SERPL-MCNC: 22 MG/DL
CALCIUM SERPL-MCNC: 10.4 MG/DL
CHLORIDE SERPL-SCNC: 100 MMOL/L
CO2 SERPL-SCNC: 23 MMOL/L
CREAT SERPL-MCNC: 1 MG/DL
EOSINOPHIL # BLD AUTO: 0.47 K/UL
EOSINOPHIL NFR BLD AUTO: 5 %
GLUCOSE SERPL-MCNC: 217 MG/DL
HCT VFR BLD CALC: 47.4 %
HGB BLD-MCNC: 14.2 G/DL
IMM GRANULOCYTES NFR BLD AUTO: 0.5 %
INR PPP: 0.96 RATIO
LYMPHOCYTES # BLD AUTO: 2.22 K/UL
LYMPHOCYTES NFR BLD AUTO: 23.8 %
MAN DIFF?: NORMAL
MCHC RBC-ENTMCNC: 26.1 PG
MCHC RBC-ENTMCNC: 30 GM/DL
MCV RBC AUTO: 87.1 FL
MONOCYTES # BLD AUTO: 0.66 K/UL
MONOCYTES NFR BLD AUTO: 7.1 %
NEUTROPHILS # BLD AUTO: 5.86 K/UL
NEUTROPHILS NFR BLD AUTO: 63 %
PLATELET # BLD AUTO: 324 K/UL
POTASSIUM SERPL-SCNC: 4.7 MMOL/L
PROT SERPL-MCNC: 7.8 G/DL
PT BLD: 11.4 SEC
RBC # BLD: 5.44 M/UL
RBC # FLD: 14.6 %
SODIUM SERPL-SCNC: 137 MMOL/L
WBC # FLD AUTO: 9.32 K/UL

## 2020-09-14 ENCOUNTER — RX RENEWAL (OUTPATIENT)
Age: 70
End: 2020-09-14

## 2020-10-08 ENCOUNTER — APPOINTMENT (OUTPATIENT)
Dept: OTOLARYNGOLOGY | Facility: CLINIC | Age: 70
End: 2020-10-08
Payer: MEDICARE

## 2020-10-08 VITALS
DIASTOLIC BLOOD PRESSURE: 90 MMHG | WEIGHT: 200 LBS | HEIGHT: 68 IN | TEMPERATURE: 97.1 F | SYSTOLIC BLOOD PRESSURE: 200 MMHG | BODY MASS INDEX: 30.31 KG/M2

## 2020-10-08 DIAGNOSIS — J35.9 CHRONIC DISEASE OF TONSILS AND ADENOIDS, UNSPECIFIED: ICD-10-CM

## 2020-10-08 PROCEDURE — 99213 OFFICE O/P EST LOW 20 MIN: CPT

## 2020-10-08 NOTE — PHYSICAL EXAM
[de-identified] : R CEA scar [Midline] : trachea located in midline position [de-identified] : No mass on palpation [de-identified] : No mass on palpation [Normal] : no rashes

## 2020-10-08 NOTE — HISTORY OF PRESENT ILLNESS
[de-identified] : 70 y/o M with a h/o R carotid stenosis for which he underwent a R CEA 1/8/20. As pt was being cleared for the CEZ he had a CXR that showed a possible lung lesion. His subsequent w/u identified a L lung CA, H underwent a L lower lobectomy for AdenoCA. All nodes were negative. As part of his w/u for the lung CA he had a PET/CT that showed uptake in the tonsils.

## 2020-10-08 NOTE — CONSULT LETTER
[Dear  ___] : Dear  [unfilled], [Courtesy Letter:] : I had the pleasure of seeing your patient, [unfilled], in my office today. [Please see my note below.] : Please see my note below. [Consult Closing:] : Thank you very much for allowing me to participate in the care of this patient.  If you have any questions, please do not hesitate to contact me. [Sincerely,] : Sincerely, [DrBlas  ___] : Dr. TORRES [FreeTextEntry2] : Jordin Ordonez MD ([facility] physician)\par  [FreeTextEntry3] : Jose Rafael Cm MD, FACS\par Chief of Otolaryngology [facility]\par  - Dept. of Otolaryngology\par [facility] [employer]\par \par

## 2020-10-19 ENCOUNTER — APPOINTMENT (OUTPATIENT)
Dept: VASCULAR SURGERY | Facility: CLINIC | Age: 70
End: 2020-10-19
Payer: MEDICARE

## 2020-10-19 PROCEDURE — 99213 OFFICE O/P EST LOW 20 MIN: CPT

## 2020-10-19 PROCEDURE — 93882 EXTRACRANIAL UNI/LTD STUDY: CPT

## 2020-10-30 ENCOUNTER — RESULT REVIEW (OUTPATIENT)
Age: 70
End: 2020-10-30

## 2020-10-30 ENCOUNTER — APPOINTMENT (OUTPATIENT)
Dept: CT IMAGING | Facility: CLINIC | Age: 70
End: 2020-10-30
Payer: MEDICARE

## 2020-10-30 ENCOUNTER — OUTPATIENT (OUTPATIENT)
Dept: OUTPATIENT SERVICES | Facility: HOSPITAL | Age: 70
LOS: 1 days | End: 2020-10-30
Payer: MEDICARE

## 2020-10-30 DIAGNOSIS — Z98.890 OTHER SPECIFIED POSTPROCEDURAL STATES: Chronic | ICD-10-CM

## 2020-10-30 DIAGNOSIS — N43.40 SPERMATOCELE OF EPIDIDYMIS, UNSPECIFIED: Chronic | ICD-10-CM

## 2020-10-30 DIAGNOSIS — C34.90 MALIGNANT NEOPLASM OF UNSPECIFIED PART OF UNSPECIFIED BRONCHUS OR LUNG: ICD-10-CM

## 2020-10-30 PROCEDURE — 71250 CT THORAX DX C-: CPT

## 2020-10-30 PROCEDURE — 71250 CT THORAX DX C-: CPT | Mod: 26

## 2020-11-04 ENCOUNTER — APPOINTMENT (OUTPATIENT)
Dept: THORACIC SURGERY | Facility: CLINIC | Age: 70
End: 2020-11-04
Payer: MEDICARE

## 2020-11-04 VITALS
HEIGHT: 68 IN | WEIGHT: 200 LBS | BODY MASS INDEX: 30.31 KG/M2 | DIASTOLIC BLOOD PRESSURE: 76 MMHG | HEART RATE: 69 BPM | SYSTOLIC BLOOD PRESSURE: 197 MMHG | OXYGEN SATURATION: 97 %

## 2020-11-04 PROCEDURE — 99214 OFFICE O/P EST MOD 30 MIN: CPT

## 2020-11-05 NOTE — PHYSICAL EXAM
[Sclera] : the sclera and conjunctiva were normal [Neck Appearance] : the appearance of the neck was normal [] : the neck was supple [Auscultation Breath Sounds / Voice Sounds] : lungs were clear to auscultation bilaterally [Heart Rate And Rhythm] : heart rate was normal and rhythm regular [Examination Of The Chest] : the chest was normal in appearance [Edema] : there was no peripheral edema [Bowel Sounds] : normal bowel sounds [Abdomen Soft] : soft [Cervical Lymph Nodes Enlarged Posterior Bilaterally] : posterior cervical [Cervical Lymph Nodes Enlarged Anterior Bilaterally] : anterior cervical [Supraclavicular Lymph Nodes Enlarged Bilaterally] : supraclavicular [No CVA Tenderness] : no ~M costovertebral angle tenderness [No Spinal Tenderness] : no spinal tenderness [Nail Clubbing] : no clubbing  or cyanosis of the fingernails [Skin Color & Pigmentation] : normal skin color and pigmentation [No Focal Deficits] : no focal deficits [Oriented To Time, Place, And Person] : oriented to person, place, and time [Impaired Insight] : insight and judgment were intact [Affect] : the affect was normal

## 2020-11-05 NOTE — HISTORY OF PRESENT ILLNESS
[FreeTextEntry1] : The patient is a 70 year old male s/p LLL lobectomy 2/2020 who presents for follow up. The patient feels well and is without complaints

## 2020-11-05 NOTE — ASSESSMENT
[FreeTextEntry1] : 70 year old male s/p LLL lobectomy doing well. Most recent CT shows two new 4 mm nodules most likely inflammatory, however I feel they should be closely followed. I have asked the patient to obtain a CT of the chest in three months and to follow up with me at that time. I have answered all the patients questions and he understands the importance of follow up.

## 2020-11-05 NOTE — CONSULT LETTER
[Dear  ___] : Dear  [unfilled], [Courtesy Letter:] : I had the pleasure of seeing your patient, [unfilled], in my office today. [Sincerely,] : Sincerely, [FreeTextEntry3] : Jordin Ordonez

## 2020-12-13 ENCOUNTER — RX RENEWAL (OUTPATIENT)
Age: 70
End: 2020-12-13

## 2020-12-21 ENCOUNTER — TRANSCRIPTION ENCOUNTER (OUTPATIENT)
Age: 70
End: 2020-12-21

## 2020-12-23 ENCOUNTER — APPOINTMENT (OUTPATIENT)
Dept: INTERNAL MEDICINE | Facility: CLINIC | Age: 70
End: 2020-12-23

## 2021-02-05 ENCOUNTER — APPOINTMENT (OUTPATIENT)
Dept: CT IMAGING | Facility: CLINIC | Age: 71
End: 2021-02-05
Payer: MEDICARE

## 2021-02-05 ENCOUNTER — RESULT REVIEW (OUTPATIENT)
Age: 71
End: 2021-02-05

## 2021-02-05 ENCOUNTER — APPOINTMENT (OUTPATIENT)
Dept: INTERNAL MEDICINE | Facility: CLINIC | Age: 71
End: 2021-02-05
Payer: MEDICARE

## 2021-02-05 ENCOUNTER — OUTPATIENT (OUTPATIENT)
Dept: OUTPATIENT SERVICES | Facility: HOSPITAL | Age: 71
LOS: 1 days | End: 2021-02-05
Payer: MEDICARE

## 2021-02-05 VITALS
SYSTOLIC BLOOD PRESSURE: 170 MMHG | HEART RATE: 80 BPM | RESPIRATION RATE: 14 BRPM | BODY MASS INDEX: 30.31 KG/M2 | WEIGHT: 200 LBS | OXYGEN SATURATION: 94 % | HEIGHT: 68 IN | DIASTOLIC BLOOD PRESSURE: 76 MMHG | TEMPERATURE: 97.2 F

## 2021-02-05 VITALS — SYSTOLIC BLOOD PRESSURE: 164 MMHG | DIASTOLIC BLOOD PRESSURE: 78 MMHG

## 2021-02-05 DIAGNOSIS — Z98.890 OTHER SPECIFIED POSTPROCEDURAL STATES: Chronic | ICD-10-CM

## 2021-02-05 DIAGNOSIS — N43.40 SPERMATOCELE OF EPIDIDYMIS, UNSPECIFIED: Chronic | ICD-10-CM

## 2021-02-05 DIAGNOSIS — B35.6 TINEA CRURIS: ICD-10-CM

## 2021-02-05 DIAGNOSIS — C34.90 MALIGNANT NEOPLASM OF UNSPECIFIED PART OF UNSPECIFIED BRONCHUS OR LUNG: ICD-10-CM

## 2021-02-05 LAB
BILIRUB UR QL STRIP: NORMAL
CLARITY UR: CLEAR
COLLECTION METHOD: NORMAL
GLUCOSE UR-MCNC: NORMAL
HCG UR QL: 0.2 EU/DL
HGB UR QL STRIP.AUTO: NORMAL
KETONES UR-MCNC: NORMAL
LEUKOCYTE ESTERASE UR QL STRIP: NORMAL
NITRITE UR QL STRIP: NORMAL
PH UR STRIP: 5.5
PROT UR STRIP-MCNC: NORMAL
SP GR UR STRIP: 1.03

## 2021-02-05 PROCEDURE — 81003 URINALYSIS AUTO W/O SCOPE: CPT | Mod: QW

## 2021-02-05 PROCEDURE — 71250 CT THORAX DX C-: CPT

## 2021-02-05 PROCEDURE — 71250 CT THORAX DX C-: CPT | Mod: 26,MH

## 2021-02-05 PROCEDURE — 36415 COLL VENOUS BLD VENIPUNCTURE: CPT

## 2021-02-05 PROCEDURE — 99214 OFFICE O/P EST MOD 30 MIN: CPT | Mod: 25

## 2021-02-05 RX ORDER — CEFDINIR 300 MG/1
300 CAPSULE ORAL
Qty: 14 | Refills: 0 | Status: DISCONTINUED | COMMUNITY
Start: 2020-07-13 | End: 2021-02-05

## 2021-02-05 RX ORDER — CLOTRIMAZOLE AND BETAMETHASONE DIPROPIONATE 10; .5 MG/G; MG/G
1-0.05 CREAM TOPICAL TWICE DAILY
Qty: 1 | Refills: 3 | Status: ACTIVE | COMMUNITY
Start: 2021-02-05 | End: 1900-01-01

## 2021-02-05 RX ORDER — OXYCODONE AND ACETAMINOPHEN 5; 325 MG/1; MG/1
5-325 TABLET ORAL
Qty: 15 | Refills: 0 | Status: DISCONTINUED | COMMUNITY
Start: 2020-07-13 | End: 2021-02-05

## 2021-02-05 NOTE — COUNSELING
[Fall prevention counseling provided] : Fall prevention counseling provided [AUDIT-C Screening administered and reviewed] : AUDIT-C Screening administered and reviewed [Potential consequences of obesity discussed] : Potential consequences of obesity discussed [Encouraged to increase physical activity] : Encouraged to increase physical activity [Decrease Portions] : decrease portions [Good understanding] : Patient has a good understanding of disease, goals and obesity follow-up plan [FreeTextEntry2] : ADA diet, low fat and low sodium  [de-identified] : Low na diet \par Low fat diet

## 2021-02-05 NOTE — HEALTH RISK ASSESSMENT
[Yes] : Yes [Monthly or less (1 pt)] : Monthly or less (1 point) [1 or 2 (0 pts)] : 1 or 2 (0 points) [Never (0 pts)] : Never (0 points) [No] : In the past 12 months have you used drugs other than those required for medical reasons? No [No falls in past year] : Patient reported no falls in the past year [0] : 2) Feeling down, depressed, or hopeless: Not at all (0) [Very Good] : ~his/her~  mood as very good [Patient reported colonoscopy was abnormal] : Patient reported colonoscopy was abnormal [Fully functional (bathing, dressing, toileting, transferring, walking, feeding)] : Fully functional (bathing, dressing, toileting, transferring, walking, feeding) [Independent] : feeding [Fully functional (using the telephone, shopping, preparing meals, housekeeping, doing laundry, using] : Fully functional and needs no help or supervision to perform IADLs (using the telephone, shopping, preparing meals, housekeeping, doing laundry, using transportation, managing medications and managing finances) [Designated Healthcare Proxy] : Designated healthcare proxy [Name: ___] : Health Care Proxy's Name: [unfilled]  [Relationship: ___] : Relationship: [unfilled] [] : No [de-identified] : quit  [Audit-CScore] : 1 [de-identified] : ADA diet  [CPY3Dilgy] : 0 [ColonoscopyDate] : 12/19 [ColonoscopyComments] : colon polyp  repeat ? [AdvancecareDate] : 06/20

## 2021-02-05 NOTE — ASSESSMENT
[FreeTextEntry1] : Patient is a 69 year old male with a past medical history as above who presents for medication renewal  and a fasting labs

## 2021-02-05 NOTE — REVIEW OF SYSTEMS
[Negative] : Heme/Lymph [Fever] : no fever [Chills] : no chills [Night Sweats] : no night sweats [Recent Change In Weight] : ~T no recent weight change [Discharge] : no discharge [Pain] : no pain [Vision Problems] : no vision problems [Itching] : no itching [Earache] : no earache [Hoarseness] : no hoarseness [Chest Pain] : no chest pain [Claudication] : no  leg claudication [Lower Ext Edema] : no lower extremity edema [Shortness Of Breath] : no shortness of breath [Dyspnea on Exertion] : not dyspnea on exertion [Abdominal Pain] : no abdominal pain [Vomiting] : no vomiting [Dysuria] : no dysuria [Incontinence] : no incontinence [Hematuria] : no hematuria [Frequency] : no frequency [Joint Stiffness] : no joint stiffness [Back Pain] : no back pain [Skin Rash] : no skin rash [Headache] : no headache [Dizziness] : no dizziness [Memory Loss] : no memory loss [Depression] : no depression [Swollen Glands] : no swollen glands

## 2021-02-05 NOTE — PLAN
[FreeTextEntry1] : Cardio - Carotid stenosis - s/p  CEA - Abnormal EKG - Patient is currently doing well.  Has mild elevation of his systolic blood pressure.  Did not take morning medications.  Advised to go home and take his meds.  Continue all medications as discussed.    Monitor BP at home.  Follow up with vascular.  Advised side effects to the medication. Advised goal of LDL less than 70 \par Continue with statin therapy.  Check labs.  \par \par Pulm - Lung  Mass s/p resection-  Request cpy of CT scan of the chest that is schedule for today.  Follow up w  pulmonology.  \par \par Endocrinology\par NIDDM - continue Metformin HCl 500 bid . - continue low carbohydrate/low sugar diet - check hemoglobin A1C and microalbumin.  Advised to become more active.  Refilled medication and test strips to be tested bid.  \par \par hyperlipidemia/hypertriglyceridemia - continue Fenofibrate 160 mg p.o.q.d. and Lipitor ad directed - continue low cholesterol/low fat diet - check labs. refilled medication \par \par vitamin D insufficiency - continue vitamin D tablets p.o.q.d. - check vitamin D\par \par Immunization Flu and  Prevnar 13 - discussed benefits of receiving the vaccine in detail.  PT still declining vax \par \par We discussed the importance of healthy lifestyles which include exercise, weight control and good diet.\par Patient's questions were answered in full detail. Advise patient if any other concerns arise to please call office to have a discussion. \par \par Patient  education  - COVID-19   Counseling and education provided to the patient.  Advised sign and symptoms of the virus.  Advised contact precautions.  Educated patient on proper hand washing and to participate in social distancing. \par Patient is in full awareness of the plan and agrees to it.  All pt question was answered.  \par \par  Consider COVID 19 vax

## 2021-02-05 NOTE — PHYSICAL EXAM
[Well Nourished] : well nourished [Well Developed] : well developed [Well-Appearing] : well-appearing [Normal Sclera/Conjunctiva] : normal sclera/conjunctiva [PERRL] : pupils equal round and reactive to light [EOMI] : extraocular movements intact [Normal Outer Ear/Nose] : the outer ears and nose were normal in appearance [Normal Oropharynx] : the oropharynx was normal [Normal TMs] : both tympanic membranes were normal [No JVD] : no jugular venous distention [No Lymphadenopathy] : no lymphadenopathy [Supple] : supple [Thyroid Normal, No Nodules] : the thyroid was normal and there were no nodules present [No Respiratory Distress] : no respiratory distress  [No Accessory Muscle Use] : no accessory muscle use [Clear to Auscultation] : lungs were clear to auscultation bilaterally [Normal Rate] : normal rate  [Regular Rhythm] : with a regular rhythm [Normal S1, S2] : normal S1 and S2 [No Carotid Bruits] : no carotid bruits [No Abdominal Bruit] : a ~M bruit was not heard ~T in the abdomen [Pedal Pulses Present] : the pedal pulses are present [No Edema] : there was no peripheral edema [No Palpable Aorta] : no palpable aorta [No Extremity Clubbing/Cyanosis] : no extremity clubbing/cyanosis [Soft] : abdomen soft [Non Tender] : non-tender [Non-distended] : non-distended [No Masses] : no abdominal mass palpated [No HSM] : no HSM [Normal Bowel Sounds] : normal bowel sounds [Urethral Meatus] : meatus normal [Penis Abnormality] : normal circumcised penis [Urinary Bladder Findings] : the bladder was normal on palpation [Scrotum] : the scrotum was normal [Rectal Exam - Seminal Vesicles] : the seminal vesicles were normal [Epididymis] : the epididymides were normal [Testes Tenderness] : no tenderness of the testes [Rectal Exam - Rectum] : digital rectal exam was normal [Prostate Tenderness] : the prostate was not tender [Normal Posterior Cervical Nodes] : no posterior cervical lymphadenopathy [Normal Anterior Cervical Nodes] : no anterior cervical lymphadenopathy [No CVA Tenderness] : no CVA  tenderness [No Spinal Tenderness] : no spinal tenderness [No Joint Swelling] : no joint swelling [Grossly Normal Strength/Tone] : grossly normal strength/tone [No Rash] : no rash [Coordination Grossly Intact] : coordination grossly intact [No Focal Deficits] : no focal deficits [Normal Gait] : normal gait [Deep Tendon Reflexes (DTR)] : deep tendon reflexes were 2+ and symmetric [Speech Grossly Normal] : speech grossly normal [Normal Affect] : the affect was normal [Alert and Oriented x3] : oriented to person, place, and time [Normal Mood] : the mood was normal [Normal Insight/Judgement] : insight and judgment were intact [Right Foot Was Examined] : Right foot ~C was examined [Left Foot Was Examined] : left foot ~C was examined [] : both feet [Stool Occult Blood] : stool negative for occult blood [de-identified] : with murmur  2-3/6 [de-identified] : obese [FreeTextEntry1] : 1 plus enlargement  of the prostate no nodule noted  [TWNoteComboBox3] : +2 [TWNoteComboBox4] : +2

## 2021-02-05 NOTE — HISTORY OF PRESENT ILLNESS
[FreeTextEntry1] : Fasting labs \par  [de-identified] : Mr. MASON is a 70 year  male, who present to the office for fasting labs.  States he general feels well.  Denies having any chest pain, SOB, fever, chills.  States he is going for a repeat CT scan of the chest to follow up on two small lung lesions.  Has an appointment with pulmonology  next week and cardio/ vasc in a few weeks.  \par State he needs renewal on all his medications.  Denies any side effects to them  \par Still has not gotten and does not want the flu or pneumonia vax. \par \par States blood pressure readings at home have been normal.  States he did not take his medication this am secondary to having the  blood test

## 2021-02-10 ENCOUNTER — APPOINTMENT (OUTPATIENT)
Dept: THORACIC SURGERY | Facility: CLINIC | Age: 71
End: 2021-02-10
Payer: MEDICARE

## 2021-02-10 VITALS
BODY MASS INDEX: 30.31 KG/M2 | WEIGHT: 200 LBS | OXYGEN SATURATION: 97 % | HEART RATE: 88 BPM | HEIGHT: 68 IN | DIASTOLIC BLOOD PRESSURE: 96 MMHG | SYSTOLIC BLOOD PRESSURE: 172 MMHG

## 2021-02-10 LAB
ALBUMIN SERPL ELPH-MCNC: 4.5 G/DL
ALP BLD-CCNC: 70 U/L
ALT SERPL-CCNC: 38 U/L
ANION GAP SERPL CALC-SCNC: 14 MMOL/L
AST SERPL-CCNC: 28 U/L
BASOPHILS # BLD AUTO: 0.08 K/UL
BASOPHILS NFR BLD AUTO: 1 %
BILIRUB SERPL-MCNC: 0.3 MG/DL
BUN SERPL-MCNC: 24 MG/DL
CALCIUM SERPL-MCNC: 10 MG/DL
CHLORIDE SERPL-SCNC: 102 MMOL/L
CO2 SERPL-SCNC: 23 MMOL/L
CREAT SERPL-MCNC: 1.15 MG/DL
EOSINOPHIL # BLD AUTO: 0.48 K/UL
EOSINOPHIL NFR BLD AUTO: 5.7 %
GLUCOSE SERPL-MCNC: 119 MG/DL
HCT VFR BLD CALC: 46.8 %
HGB BLD-MCNC: 14.1 G/DL
IMM GRANULOCYTES NFR BLD AUTO: 0.4 %
LYMPHOCYTES # BLD AUTO: 2.45 K/UL
LYMPHOCYTES NFR BLD AUTO: 29.2 %
MAN DIFF?: NORMAL
MCHC RBC-ENTMCNC: 26.5 PG
MCHC RBC-ENTMCNC: 30.1 GM/DL
MCV RBC AUTO: 88 FL
MONOCYTES # BLD AUTO: 0.7 K/UL
MONOCYTES NFR BLD AUTO: 8.3 %
NEUTROPHILS # BLD AUTO: 4.66 K/UL
NEUTROPHILS NFR BLD AUTO: 55.4 %
PLATELET # BLD AUTO: 334 K/UL
POTASSIUM SERPL-SCNC: 4.7 MMOL/L
PROT SERPL-MCNC: 7.9 G/DL
RBC # BLD: 5.32 M/UL
RBC # FLD: 14.7 %
SODIUM SERPL-SCNC: 139 MMOL/L
WBC # FLD AUTO: 8.4 K/UL

## 2021-02-10 PROCEDURE — 99213 OFFICE O/P EST LOW 20 MIN: CPT

## 2021-02-11 LAB
25(OH)D3 SERPL-MCNC: 55.3 NG/ML
CHOLEST SERPL-MCNC: 187 MG/DL
CREAT SPEC-SCNC: 132 MG/DL
ESTIMATED AVERAGE GLUCOSE: 169 MG/DL
HBA1C MFR BLD HPLC: 7.5 %
HDLC SERPL-MCNC: 29 MG/DL
LDLC SERPL CALC-MCNC: 123 MG/DL
MICROALBUMIN 24H UR DL<=1MG/L-MCNC: 2 MG/DL
MICROALBUMIN/CREAT 24H UR-RTO: 15 MG/G
NONHDLC SERPL-MCNC: 157 MG/DL
PSA SERPL-MCNC: 0.57 NG/ML
TRIGL SERPL-MCNC: 171 MG/DL
TSH SERPL-ACNC: 0.89 UIU/ML

## 2021-02-11 NOTE — HISTORY OF PRESENT ILLNESS
[FreeTextEntry1] : The patient  is a 70 year old male with an extensive smoking history who was found to have a left lower lobe nodule concerning for carcinoma. s/p LLLobectomy 2/2020 for (pT1c) Stage IA3 adenocarcinoma. \par \par f/u CT in November showing two new 4 mm nodules most likely inflammatory, but encouraged to f/u with repeat scan in 3 months\par \par \par Patient presents to office today to discuss scan results.  PT feels well without complaints.\par \par  \par

## 2021-02-11 NOTE — ASSESSMENT
[FreeTextEntry1] : The patient  is a 70 year old male with an extensive smoking history who was found to have a left lower lobe nodule concerning for carcinoma. s/p LLLobectomy 2/2020 for (pT1c) Stage IA3 adenocarcinoma. \par \par f/u CT in November showing two new 4 mm nodules most likely inflammatory, but encouraged to f/u with repeat scan in 3 months\par \par I have reviewed the medical records and imaging with the patient at the time of this office consultation, and I've made the following recommendations:\par - Resolution of previously observed 4 mm left lower lobe nodule ; Clustered right lower lobe opacities are overall decreased compared to the previous CT. However, there  is a new subpleural 4 mm ill-defined nodule. Three-month follow-up CT recommended. Recommendations reviewed with patient during this office visit, and all questions answered; Patient instructed on the importance of follow up and verbalizes understanding.\par \par I have asked the patient to obtain a repeat CT in 4 months at to follow up with me in my office at that time.

## 2021-02-11 NOTE — PHYSICAL EXAM
[Sclera] : the sclera and conjunctiva were normal [Neck Appearance] : the appearance of the neck was normal [] : the neck was supple [Auscultation Breath Sounds / Voice Sounds] : lungs were clear to auscultation bilaterally [Heart Rate And Rhythm] : heart rate was normal and rhythm regular [Edema] : there was no peripheral edema [Examination Of The Chest] : the chest was normal in appearance [Bowel Sounds] : normal bowel sounds [Cervical Lymph Nodes Enlarged Posterior Bilaterally] : posterior cervical [Cervical Lymph Nodes Enlarged Anterior Bilaterally] : anterior cervical [Supraclavicular Lymph Nodes Enlarged Bilaterally] : supraclavicular [No CVA Tenderness] : no ~M costovertebral angle tenderness [No Spinal Tenderness] : no spinal tenderness [Nail Clubbing] : no clubbing  or cyanosis of the fingernails [Skin Color & Pigmentation] : normal skin color and pigmentation [No Focal Deficits] : no focal deficits [Oriented To Time, Place, And Person] : oriented to person, place, and time [Impaired Insight] : insight and judgment were intact [Affect] : the affect was normal

## 2021-02-11 NOTE — CONSULT LETTER
[Dear  ___] : Dear  [unfilled], [Courtesy Letter:] : I had the pleasure of seeing your patient, [unfilled], in my office today. [Sincerely,] : Sincerely, [Please see my note below.] : Please see my note below. [FreeTextEntry2] : Dr. Mcmahon (Pulm/Ref)\par Dr. Arnoldo Yoo (PCP)\par Dr. Lopez (Card) [FreeTextEntry3] : Jordin Ordonez MD\par Attending Surgeon\par Division of Thoracic Surgery\par , [facility] [employer] at [facility]\par

## 2021-05-19 NOTE — ASU PREOP CHECKLIST - AICD PRESENT
Post Biopsy Wound Care Instructions  After your biopsy, minimize activity of the affected area for the remainder of the day if possible.  Minimize straining, strenuous activity, bending, and lifting.  If you have discomfort, use Tylenol or ibuprofen every 4-6 hours.  Minor swelling after a biopsy is expected.  Use ice-packs 2-3 times a day for 10-15 minutes if it occurs.   Keep the bandage applied in the Dermatology Clinic in place for 24 hours. The surgical site needs to be kept completely DRY during this time to optimize healing.  You may begin showering or bathing normally 24 hours after your biopsy. It is okay for water and/or soap and/or shampoo to run over the wound. Dry carefully and re-apply a bandage as below.  After cleaning wound with soap and water, re-apply a thin layer of petroleum jelly, Vaseline or Aquaphor to the biopsy site and cover with a bandage.  Change the bandage and apply petroleum jelly once a day.   If BLEEDING occurs, apply continuous gentle pressure with a clean, dry washcloth or gauze for 15 minutes. DO NOT check the site during these 15 minutes.  IF there is still bleeding after that time repeat this step.  If bleeding continues, call our office immediately.  Some redness and swelling is expected.  This does not require treatment and will resolve in 1-2 weeks.  Skin biopsies rarely become infected. You do not need to apply a topical antibitoic. Topical antibiotics like neosporin frequently cause skin allergies.  If the area should become very SORE or RED, if there is DRAINAGE, or if you have a FEVER, call our office immediately.  If you have bleeding that will not stop, or are worried about infection, please call the office at [phone].   Results from a biopsy take roughly 1 week to come back, we will contact you with the results as soon as we get them.             no

## 2021-06-11 ENCOUNTER — APPOINTMENT (OUTPATIENT)
Dept: INTERNAL MEDICINE | Facility: CLINIC | Age: 71
End: 2021-06-11
Payer: MEDICARE

## 2021-06-11 ENCOUNTER — APPOINTMENT (OUTPATIENT)
Dept: CT IMAGING | Facility: CLINIC | Age: 71
End: 2021-06-11
Payer: MEDICARE

## 2021-06-11 ENCOUNTER — RESULT REVIEW (OUTPATIENT)
Age: 71
End: 2021-06-11

## 2021-06-11 ENCOUNTER — OUTPATIENT (OUTPATIENT)
Dept: OUTPATIENT SERVICES | Facility: HOSPITAL | Age: 71
LOS: 1 days | End: 2021-06-11
Payer: MEDICARE

## 2021-06-11 VITALS
TEMPERATURE: 97.9 F | SYSTOLIC BLOOD PRESSURE: 158 MMHG | HEART RATE: 70 BPM | OXYGEN SATURATION: 97 % | DIASTOLIC BLOOD PRESSURE: 80 MMHG | HEIGHT: 68 IN | WEIGHT: 197 LBS | RESPIRATION RATE: 16 BRPM | BODY MASS INDEX: 29.86 KG/M2

## 2021-06-11 DIAGNOSIS — Z98.890 OTHER SPECIFIED POSTPROCEDURAL STATES: Chronic | ICD-10-CM

## 2021-06-11 DIAGNOSIS — C34.90 MALIGNANT NEOPLASM OF UNSPECIFIED PART OF UNSPECIFIED BRONCHUS OR LUNG: ICD-10-CM

## 2021-06-11 DIAGNOSIS — N43.40 SPERMATOCELE OF EPIDIDYMIS, UNSPECIFIED: Chronic | ICD-10-CM

## 2021-06-11 DIAGNOSIS — M79.10 MYALGIA, UNSPECIFIED SITE: ICD-10-CM

## 2021-06-11 LAB
BILIRUB UR QL STRIP: NORMAL
CLARITY UR: CLEAR
COLLECTION METHOD: NORMAL
GLUCOSE UR-MCNC: NORMAL
HCG UR QL: 0.2 EU/DL
HGB UR QL STRIP.AUTO: NORMAL
KETONES UR-MCNC: NORMAL
LEUKOCYTE ESTERASE UR QL STRIP: NORMAL
NITRITE UR QL STRIP: NORMAL
PH UR STRIP: 5
PROT UR STRIP-MCNC: NORMAL
SP GR UR STRIP: 1.03

## 2021-06-11 PROCEDURE — 99214 OFFICE O/P EST MOD 30 MIN: CPT | Mod: 25

## 2021-06-11 PROCEDURE — 36415 COLL VENOUS BLD VENIPUNCTURE: CPT

## 2021-06-11 PROCEDURE — 71250 CT THORAX DX C-: CPT | Mod: 26,MH

## 2021-06-11 PROCEDURE — 81003 URINALYSIS AUTO W/O SCOPE: CPT | Mod: QW

## 2021-06-11 PROCEDURE — 71250 CT THORAX DX C-: CPT

## 2021-06-11 RX ORDER — RAMIPRIL 5 MG/1
5 CAPSULE ORAL
Qty: 90 | Refills: 2 | Status: DISCONTINUED | COMMUNITY
Start: 2020-09-14 | End: 2021-06-11

## 2021-06-11 RX ORDER — PHENAZOPYRIDINE HYDROCHLORIDE 200 MG/1
200 TABLET ORAL
Qty: 8 | Refills: 0 | Status: DISCONTINUED | COMMUNITY
Start: 2020-07-13 | End: 2021-06-11

## 2021-06-11 NOTE — PHYSICAL EXAM
[Well Nourished] : well nourished [Well Developed] : well developed [Well-Appearing] : well-appearing [Normal Sclera/Conjunctiva] : normal sclera/conjunctiva [PERRL] : pupils equal round and reactive to light [EOMI] : extraocular movements intact [Normal Outer Ear/Nose] : the outer ears and nose were normal in appearance [Normal Oropharynx] : the oropharynx was normal [Normal TMs] : both tympanic membranes were normal [No JVD] : no jugular venous distention [No Lymphadenopathy] : no lymphadenopathy [Supple] : supple [Thyroid Normal, No Nodules] : the thyroid was normal and there were no nodules present [No Respiratory Distress] : no respiratory distress  [No Accessory Muscle Use] : no accessory muscle use [Clear to Auscultation] : lungs were clear to auscultation bilaterally [Normal Rate] : normal rate  [Regular Rhythm] : with a regular rhythm [Normal S1, S2] : normal S1 and S2 [No Carotid Bruits] : no carotid bruits [No Abdominal Bruit] : a ~M bruit was not heard ~T in the abdomen [Pedal Pulses Present] : the pedal pulses are present [No Edema] : there was no peripheral edema [No Palpable Aorta] : no palpable aorta [No Extremity Clubbing/Cyanosis] : no extremity clubbing/cyanosis [Soft] : abdomen soft [Non Tender] : non-tender [Non-distended] : non-distended [No Masses] : no abdominal mass palpated [No HSM] : no HSM [Normal Bowel Sounds] : normal bowel sounds [Urethral Meatus] : meatus normal [Penis Abnormality] : normal circumcised penis [Urinary Bladder Findings] : the bladder was normal on palpation [Scrotum] : the scrotum was normal [Rectal Exam - Seminal Vesicles] : the seminal vesicles were normal [Epididymis] : the epididymides were normal [Testes Tenderness] : no tenderness of the testes [Rectal Exam - Rectum] : digital rectal exam was normal [Prostate Tenderness] : the prostate was not tender [Normal Posterior Cervical Nodes] : no posterior cervical lymphadenopathy [Normal Anterior Cervical Nodes] : no anterior cervical lymphadenopathy [No CVA Tenderness] : no CVA  tenderness [No Spinal Tenderness] : no spinal tenderness [No Joint Swelling] : no joint swelling [Grossly Normal Strength/Tone] : grossly normal strength/tone [No Rash] : no rash [Coordination Grossly Intact] : coordination grossly intact [No Focal Deficits] : no focal deficits [Normal Gait] : normal gait [Deep Tendon Reflexes (DTR)] : deep tendon reflexes were 2+ and symmetric [Speech Grossly Normal] : speech grossly normal [Normal Affect] : the affect was normal [Alert and Oriented x3] : oriented to person, place, and time [Normal Mood] : the mood was normal [Normal Insight/Judgement] : insight and judgment were intact [Stool Occult Blood] : stool negative for occult blood [de-identified] : with murmur  2-3/6 [de-identified] : obese [FreeTextEntry1] : 1 plus enlargement  of the prostate no nodule noted  [TWNoteComboBox3] : +2 [TWNoteComboBox4] : +2

## 2021-06-11 NOTE — HEALTH RISK ASSESSMENT
[Yes] : Yes [Monthly or less (1 pt)] : Monthly or less (1 point) [1 or 2 (0 pts)] : 1 or 2 (0 points) [Never (0 pts)] : Never (0 points) [No] : In the past 12 months have you used drugs other than those required for medical reasons? No [No falls in past year] : Patient reported no falls in the past year [0] : 2) Feeling down, depressed, or hopeless: Not at all (0) [Designated Healthcare Proxy] : Designated healthcare proxy [Name: ___] : Health Care Proxy's Name: [unfilled]  [Relationship: ___] : Relationship: [unfilled] [] : No [de-identified] : quit  [Audit-CScore] : 1 [de-identified] : ADA diet  [AIS4Evonc] : 0 [AdvancecareDate] : 06/21

## 2021-06-11 NOTE — REVIEW OF SYSTEMS
[Negative] : Heme/Lymph [Fever] : no fever [Chills] : no chills [Night Sweats] : no night sweats [Recent Change In Weight] : ~T no recent weight change [Discharge] : no discharge [Pain] : no pain [Vision Problems] : no vision problems [Itching] : no itching [Earache] : no earache [Nasal Discharge] : no nasal discharge [Hoarseness] : no hoarseness [Chest Pain] : no chest pain [Claudication] : no  leg claudication [Lower Ext Edema] : no lower extremity edema [Shortness Of Breath] : no shortness of breath [Dyspnea on Exertion] : not dyspnea on exertion [Abdominal Pain] : no abdominal pain [Vomiting] : no vomiting [Dysuria] : no dysuria [Incontinence] : no incontinence [Hematuria] : no hematuria [Frequency] : no frequency [Joint Stiffness] : no joint stiffness [Back Pain] : no back pain [Skin Rash] : no skin rash [Headache] : no headache [Dizziness] : no dizziness [Memory Loss] : no memory loss [Depression] : no depression [Swollen Glands] : no swollen glands

## 2021-06-11 NOTE — HISTORY OF PRESENT ILLNESS
[FreeTextEntry1] : Fasting labs, renewal of medications\par  [de-identified] : Mr. MASON is a 70 year  male, who present to the office for fasting labs and medication renewal .  States he general feels well.  Denies having any chest pain, SOB, fever, chills.  States he is going for a repeat CT scan of the chest today.  Also schedule for a carotid sonogram on Monday.\par Did complete the covid vax \par Did have yearly eye exam \par \par Still has not gotten and does not want the flu or pneumonia vax. \par \par States blood pressure readings at home have been normal.  States he did not take his medication this am secondary to having the  blood test

## 2021-06-11 NOTE — DATA REVIEWED
[No studies available for review at this time.] : No studies available for review at this time. [FreeTextEntry1] : \par reviewed CT scan   going for repeat today\par Reviewed labs \par

## 2021-06-11 NOTE — PLAN
[FreeTextEntry1] : Cardio - Carotid stenosis - s/p  CEA -  Patient is currently doing well.  Has mild elevation of his systolic blood pressure.  Did not take morning medication this am.  Advised to go home and take his Altace.  Continue all medications as discussed.    Monitor BP at home.  Follow up with vascular.  Advised side effects to the medication. Advised goal of LDL less than 70 \par Continue with statin therapy.  Check labs.  \par Check cpk\par Call to get recent retinal exam consult \par \par Pulm - Lung  Mass s/p resection-  Request cpy of CT scan of the chest that is schedule for today.  Follow up w  pulmonology.  Advised to get pneumonia vax 23.  Advised to discuss with his pulmonologist \par \par Endocrinology\par NIDDM - continue Metformin HCl 500 bid . - continue low carbohydrate/low sugar diet - check hemoglobin A1C and microalbumin.  Advised to become more active.  Refilled medication and test strips to be tested bid.  \par \par hyperlipidemia/hypertriglyceridemia - continue Fenofibrate 160 mg p.o.q.d. and Lipitor ad directed - continue low cholesterol/low fat diet - check labs. refilled medication \par \par vitamin D insufficiency - continue vitamin D tablets p.o.q.d. - check vitamin D level today \par \par Immunization Flu and  Prevnar 13 - discussed benefits of receiving the vaccine in detail.  PT still declining vax \par \par We discussed the importance of healthy lifestyles which include exercise, weight control and good diet.\par Patient's questions were answered in full detail. Advise patient if any other concerns arise to please call office to have a discussion. \par \par Patient  education  - COVID-19   Counseling and education provided to the patient.  Advised sign and symptoms of the virus.  Advised contact precautions.  Educated patient on proper hand washing and to participate in social distancing.  completed covid vax \par Patient is in full awareness of the plan and agrees to it.  All pt question was answered.  \par

## 2021-06-14 ENCOUNTER — APPOINTMENT (OUTPATIENT)
Dept: VASCULAR SURGERY | Facility: CLINIC | Age: 71
End: 2021-06-14
Payer: MEDICARE

## 2021-06-14 PROCEDURE — 93880 EXTRACRANIAL BILAT STUDY: CPT

## 2021-06-14 PROCEDURE — 99213 OFFICE O/P EST LOW 20 MIN: CPT

## 2021-06-14 NOTE — PHYSICAL EXAM
[JVD] : no jugular venous distention  [Normal Breath Sounds] : Normal breath sounds [Normal Heart Sounds] : normal heart sounds [Right Carotid Bruit] : right carotid bruit heard [2+] : left 2+ [Ankle Swelling (On Exam)] : not present [Varicose Veins Of Lower Extremities] : not present [] : not present [Abdomen Masses] : No abdominal masses [No Rash or Lesion] : No rash or lesion [Skin Ulcer] : no ulcer [Oriented to Person] : oriented to person [Oriented to Place] : oriented to place [Calm] : calm [de-identified] : appears well  [de-identified] : no facial asymmetry

## 2021-06-14 NOTE — ASSESSMENT
[FreeTextEntry1] : Patient with carotid disease.  Widely patent right carotid.  Continue aspirin.  Follow-up in 1 year.

## 2021-06-16 ENCOUNTER — APPOINTMENT (OUTPATIENT)
Dept: THORACIC SURGERY | Facility: CLINIC | Age: 71
End: 2021-06-16
Payer: MEDICARE

## 2021-06-16 VITALS
HEIGHT: 68 IN | OXYGEN SATURATION: 98 % | WEIGHT: 198 LBS | BODY MASS INDEX: 30.01 KG/M2 | SYSTOLIC BLOOD PRESSURE: 183 MMHG | HEART RATE: 71 BPM | DIASTOLIC BLOOD PRESSURE: 105 MMHG

## 2021-06-16 PROCEDURE — 99213 OFFICE O/P EST LOW 20 MIN: CPT

## 2021-06-16 NOTE — DATA REVIEWED
[FreeTextEntry1] : Independently reviewed the following imaging: \par - CT Chest 2/2021\par - CT Chest 6/2021

## 2021-06-16 NOTE — ASSESSMENT
[FreeTextEntry1] : The patient is a 70 year old male with an extensive smoking history who is s/p LLL Lobectomy 2/2020 for (pT1c) Stage IA3 adenocarcinoma. Most recent CT of the chest shows no evidence of recurrent or metastatic disease. It does have increased tree in but opacities in the RUL c/w inflammation. I have asked the patient to follow up with his pulmonologist at his earliest convenience. I have asked the patient to obtain a repeat CT of the chest in 4. months and to follow up with me at that time. I have answered all of the patients questions and he understands the importance of follow up.\par

## 2021-06-16 NOTE — CONSULT LETTER
[Dear  ___] : Dear  [unfilled], [Courtesy Letter:] : I had the pleasure of seeing your patient, [unfilled], in my office today. [Please see my note below.] : Please see my note below. [Sincerely,] : Sincerely, [FreeTextEntry2] : Dr. Mcmahon (Pulm/Ref)\par Dr. Arnoldo Yoo (PCP)\par Dr. Lopez (Card)  [FreeTextEntry3] : Jordin Ordonez MD\par Attending Surgeon\par Division of Thoracic Surgery\par , [facility] [employer] [facility] at [facility]\par

## 2021-06-16 NOTE — PHYSICAL EXAM
[Sclera] : the sclera and conjunctiva were normal [Auscultation Breath Sounds / Voice Sounds] : lungs were clear to auscultation bilaterally [Neck Appearance] : the appearance of the neck was normal [Heart Rate And Rhythm] : heart rate was normal and rhythm regular [Examination Of The Chest] : the chest was normal in appearance [Chest Visual Inspection Thoracic Asymmetry] : no chest asymmetry [Edema] : there was no peripheral edema [Breast Palpation Mass] : no palpable masses [Bowel Sounds] : normal bowel sounds [Cervical Lymph Nodes Enlarged Posterior Bilaterally] : posterior cervical [Cervical Lymph Nodes Enlarged Anterior Bilaterally] : anterior cervical [No Spinal Tenderness] : no spinal tenderness [Supraclavicular Lymph Nodes Enlarged Bilaterally] : supraclavicular [Nail Clubbing] : no clubbing  or cyanosis of the fingernails [Skin Color & Pigmentation] : normal skin color and pigmentation [Oriented To Time, Place, And Person] : oriented to person, place, and time [Impaired Insight] : insight and judgment were intact [Affect] : the affect was normal

## 2021-06-16 NOTE — HISTORY OF PRESENT ILLNESS
[FreeTextEntry1] : The patient is a 70 year old male with an extensive smoking history who was found to have a left lower lobe nodule concerning for carcinoma. \par \par Patient is s/p Left VATS, LLLobectomy on 02/11/2020 for (pT1cN0) Stage IA3 adenocarcinoma. \par \par F/u CT in November, 2020 demonstrating  two new 4 mm nodules, which had been noted to resolve on the follow up imaging in February 2021.However, found to have a new subpleural 4 mm ill-defined nodule. Advised to follow up in 4 months with repeat CT chest.\par \par CT chest on 06/11/2021:\par - post-op changes\par -  Since 2/5/2021, the centrilobular nodules have increased. These findings can occur in the clinical setting of inflammation or alternatively infection such as atypical causes of infection.\par \par Patient is here today for a follow up. Pt feels well, without new complaints\par \par

## 2021-06-17 LAB
25(OH)D3 SERPL-MCNC: 60.3 NG/ML
ALBUMIN SERPL ELPH-MCNC: 4.5 G/DL
ALP BLD-CCNC: 58 U/L
ALT SERPL-CCNC: 27 U/L
ANION GAP SERPL CALC-SCNC: 14 MMOL/L
AST SERPL-CCNC: 19 U/L
BASOPHILS # BLD AUTO: 0.07 K/UL
BASOPHILS NFR BLD AUTO: 0.7 %
BILIRUB SERPL-MCNC: 0.2 MG/DL
BUN SERPL-MCNC: 21 MG/DL
CALCIUM SERPL-MCNC: 10.2 MG/DL
CHLORIDE SERPL-SCNC: 104 MMOL/L
CHOLEST SERPL-MCNC: 172 MG/DL
CK SERPL-CCNC: 116 U/L
CO2 SERPL-SCNC: 23 MMOL/L
CREAT SERPL-MCNC: 0.96 MG/DL
CREAT SPEC-SCNC: 142 MG/DL
EOSINOPHIL # BLD AUTO: 0.5 K/UL
EOSINOPHIL NFR BLD AUTO: 5.3 %
ESTIMATED AVERAGE GLUCOSE: 143 MG/DL
GLUCOSE SERPL-MCNC: 100 MG/DL
HBA1C MFR BLD HPLC: 6.6 %
HCT VFR BLD CALC: 47.8 %
HDLC SERPL-MCNC: 33 MG/DL
HGB BLD-MCNC: 13.8 G/DL
IMM GRANULOCYTES NFR BLD AUTO: 0.2 %
LDLC SERPL CALC-MCNC: 114 MG/DL
LYMPHOCYTES # BLD AUTO: 2.53 K/UL
LYMPHOCYTES NFR BLD AUTO: 26.9 %
MAN DIFF?: NORMAL
MCHC RBC-ENTMCNC: 26 PG
MCHC RBC-ENTMCNC: 28.9 GM/DL
MCV RBC AUTO: 90 FL
MICROALBUMIN 24H UR DL<=1MG/L-MCNC: 2.2 MG/DL
MICROALBUMIN/CREAT 24H UR-RTO: 16 MG/G
MONOCYTES # BLD AUTO: 0.72 K/UL
MONOCYTES NFR BLD AUTO: 7.7 %
NEUTROPHILS # BLD AUTO: 5.57 K/UL
NEUTROPHILS NFR BLD AUTO: 59.2 %
NONHDLC SERPL-MCNC: 139 MG/DL
PLATELET # BLD AUTO: 350 K/UL
POTASSIUM SERPL-SCNC: 5.4 MMOL/L
PROT SERPL-MCNC: 7.7 G/DL
RBC # BLD: 5.31 M/UL
RBC # FLD: 14.6 %
SODIUM SERPL-SCNC: 141 MMOL/L
TRIGL SERPL-MCNC: 124 MG/DL
TSH SERPL-ACNC: 0.88 UIU/ML
WBC # FLD AUTO: 9.41 K/UL

## 2021-09-03 ENCOUNTER — APPOINTMENT (OUTPATIENT)
Dept: PULMONOLOGY | Facility: CLINIC | Age: 71
End: 2021-09-03
Payer: MEDICARE

## 2021-09-03 VITALS
BODY MASS INDEX: 29.55 KG/M2 | HEIGHT: 68 IN | SYSTOLIC BLOOD PRESSURE: 175 MMHG | OXYGEN SATURATION: 96 % | HEART RATE: 70 BPM | DIASTOLIC BLOOD PRESSURE: 78 MMHG | WEIGHT: 195 LBS

## 2021-09-03 DIAGNOSIS — R93.89 ABNORMAL FINDINGS ON DIAGNOSTIC IMAGING OF OTHER SPECIFIED BODY STRUCTURES: ICD-10-CM

## 2021-09-03 PROCEDURE — 99214 OFFICE O/P EST MOD 30 MIN: CPT

## 2021-09-03 NOTE — PROCEDURE
[FreeTextEntry1] : \par EXAM: CT CHEST\par \par \par PROCEDURE DATE: 06/11/2021\par \par \par \par INTERPRETATION: CT CHEST WITHOUT CONTRAST\par \par INDICATION: Adenocarcinoma of lung.\par \par TECHNIQUE: Unenhanced helical images were obtained of the chest. Coronal and sagittal images were reconstructed. Maximum intensity projection images were generated.\par \par COMPARISON: CT chest 2/5/2021, 10/30/2020, 6/24/2020.\par \par FINDINGS:\par \par Tubes/Lines: None.\par \par Lungs, airways and pleura: Since 2/5/2021, the centrilobular nodules have increased.\par \par The biapical opacities and emphysema are unchanged. Left lower lobe lobectomy.\par \par No pleural effusion or pneumothorax.\par \par Mediastinum: The visualized thyroid gland is unremarkable. The chest lymph nodes measure less than 10 mm in the short axis. The esophagus is unremarkable.\par \par The heart is normal in size. No pericardial effusion. Coronary artery calcifications. Left-sided aortic arch and left-sided descending thoracic aorta. Aortic calcifications. The aorta is tortuous and ectatic.\par \par Upper Abdomen: The upper abdomen is unremarkable.\par \par Bones And Soft Tissues: The bones are unremarkable except for degenerative change. The soft tissues are unremarkable.\par \par \par IMPRESSION:\par \par 1. Since 2/5/2021, the centrilobular nodules have increased. These findings can occur in the clinical setting of inflammation or alternatively infection such as atypical causes of infection.\par 2. Left lower lobe lobectomy\par \par \par LEON HILLS MD; Attending Radiologist\par This document has been electronically signed. Binh 15 2021 1:06PM\par \par \par \par Prior exams reviewed:\par PFT: Normal spirometry without a significant bronchodilator response.  Lung volumes normal. DLCO normal.  Slight reduction in FVC and FEV1 from prior to VATS.\par \par \par Pathology --2/2020-adenoca of lung\par \par EXAM: CT CHEST \par \par \par PROCEDURE DATE: 06/24/2020 \par \par INTERPRETATION: EXAMINATION: CT CHEST \par \par CLINICAL INDICATION: Lung cancer. \par \par TECHNIQUE: Noncontrast CT of the chest was obtained. \par \par COMPARISON: 1/2/2020. \par \par FINDINGS: \par \par AIRWAYS AND LUNGS: Left lower lobe wedge resection with associated \par postsurgical changes. Emphysema. Scattered 2 mm nodules bilaterally are \par unchanged. \par \par MEDIASTINUM AND PLEURA: There are no enlarged mediastinal, hilar or axillary \par lymph nodes. The visualized portion of the thyroid gland is unremarkable. \par There is no pleural effusion. There is no pneumothorax. \par \par HEART AND VESSELS: The heart is normal in size. There are atherosclerotic \par calcifications of the aorta and coronary arteries. There is no pericardial \par effusion. \par \par UPPER ABDOMEN: Images of the upper abdomen demonstrate no abnormality. \par \par BONES AND SOFT TISSUES: The bones are unremarkable. The soft tissues are \par unremarkable. \par \par TUBES/LINES: None. \par \par IMPRESSION: \par Left lower lobe wedge resection with associated postsurgical changes. \par Emphysema. Scattered 2 mm nodules bilaterally are unchanged. \par \par

## 2021-09-03 NOTE — ASSESSMENT
[FreeTextEntry1] : suggest mucinex bid followed by acapella use for at least next one month until has repeat scan, pt reluctant as he is feeling fine.  Encouraged to do this airway clearance so there is no confusion about whether or not these new nodules are cancer related.\par

## 2021-09-03 NOTE — HISTORY OF PRESENT ILLNESS
[Former] : former [TextBox_4] : doing well\par no resp complaints\par had repeat ct with , has some new tree in bud opacities

## 2021-09-10 ENCOUNTER — RX RENEWAL (OUTPATIENT)
Age: 71
End: 2021-09-10

## 2021-10-15 ENCOUNTER — OUTPATIENT (OUTPATIENT)
Dept: OUTPATIENT SERVICES | Facility: HOSPITAL | Age: 71
LOS: 1 days | End: 2021-10-15
Payer: MEDICARE

## 2021-10-15 ENCOUNTER — APPOINTMENT (OUTPATIENT)
Dept: CT IMAGING | Facility: CLINIC | Age: 71
End: 2021-10-15
Payer: MEDICARE

## 2021-10-15 DIAGNOSIS — C34.90 MALIGNANT NEOPLASM OF UNSPECIFIED PART OF UNSPECIFIED BRONCHUS OR LUNG: ICD-10-CM

## 2021-10-15 DIAGNOSIS — Z98.890 OTHER SPECIFIED POSTPROCEDURAL STATES: Chronic | ICD-10-CM

## 2021-10-15 DIAGNOSIS — N43.40 SPERMATOCELE OF EPIDIDYMIS, UNSPECIFIED: Chronic | ICD-10-CM

## 2021-10-15 PROCEDURE — 71250 CT THORAX DX C-: CPT

## 2021-10-15 PROCEDURE — 71250 CT THORAX DX C-: CPT | Mod: 26,MH

## 2021-10-20 ENCOUNTER — APPOINTMENT (OUTPATIENT)
Dept: THORACIC SURGERY | Facility: CLINIC | Age: 71
End: 2021-10-20
Payer: MEDICARE

## 2021-10-20 VITALS
OXYGEN SATURATION: 99 % | WEIGHT: 195 LBS | BODY MASS INDEX: 29.55 KG/M2 | DIASTOLIC BLOOD PRESSURE: 98 MMHG | HEART RATE: 70 BPM | SYSTOLIC BLOOD PRESSURE: 200 MMHG | HEIGHT: 68 IN

## 2021-10-20 PROCEDURE — 99213 OFFICE O/P EST LOW 20 MIN: CPT

## 2021-10-21 ENCOUNTER — RESULT CHARGE (OUTPATIENT)
Age: 71
End: 2021-10-21

## 2021-10-22 ENCOUNTER — RX RENEWAL (OUTPATIENT)
Age: 71
End: 2021-10-22

## 2021-10-22 ENCOUNTER — APPOINTMENT (OUTPATIENT)
Dept: INTERNAL MEDICINE | Facility: CLINIC | Age: 71
End: 2021-10-22
Payer: MEDICARE

## 2021-10-22 VITALS
OXYGEN SATURATION: 99 % | HEART RATE: 70 BPM | RESPIRATION RATE: 16 BRPM | WEIGHT: 195 LBS | TEMPERATURE: 98.1 F | DIASTOLIC BLOOD PRESSURE: 82 MMHG | BODY MASS INDEX: 29.65 KG/M2 | SYSTOLIC BLOOD PRESSURE: 142 MMHG

## 2021-10-22 DIAGNOSIS — R31.9 HEMATURIA, UNSPECIFIED: ICD-10-CM

## 2021-10-22 DIAGNOSIS — K21.9 GASTRO-ESOPHAGEAL REFLUX DISEASE W/OUT ESOPHAGITIS: ICD-10-CM

## 2021-10-22 DIAGNOSIS — Z00.00 ENCOUNTER FOR GENERAL ADULT MEDICAL EXAMINATION W/OUT ABNORMAL FINDINGS: ICD-10-CM

## 2021-10-22 DIAGNOSIS — Z01.84 ENCOUNTER FOR ANTIBODY RESPONSE EXAMINATION: ICD-10-CM

## 2021-10-22 LAB
BILIRUB UR QL STRIP: NORMAL
CLARITY UR: CLEAR
COLLECTION METHOD: NORMAL
GLUCOSE UR-MCNC: NORMAL
HCG UR QL: 0.2 EU/DL
HGB UR QL STRIP.AUTO: ABNORMAL
KETONES UR-MCNC: NORMAL
LEUKOCYTE ESTERASE UR QL STRIP: NORMAL
NITRITE UR QL STRIP: NORMAL
PH UR STRIP: 5
PROT UR STRIP-MCNC: NORMAL
SP GR UR STRIP: 1.02

## 2021-10-22 PROCEDURE — 81003 URINALYSIS AUTO W/O SCOPE: CPT | Mod: QW

## 2021-10-22 PROCEDURE — 93000 ELECTROCARDIOGRAM COMPLETE: CPT

## 2021-10-22 PROCEDURE — G0439: CPT

## 2021-10-22 PROCEDURE — 36415 COLL VENOUS BLD VENIPUNCTURE: CPT

## 2021-10-22 PROCEDURE — 82270 OCCULT BLOOD FECES: CPT

## 2021-10-22 RX ORDER — ATORVASTATIN CALCIUM 20 MG/1
20 TABLET, FILM COATED ORAL
Qty: 90 | Refills: 0 | Status: DISCONTINUED | COMMUNITY
Start: 2020-06-23 | End: 2021-10-22

## 2021-10-22 RX ORDER — FENOFIBRATE 160 MG/1
160 TABLET ORAL
Qty: 90 | Refills: 2 | Status: ACTIVE | COMMUNITY
Start: 2019-05-06 | End: 1900-01-01

## 2021-10-22 NOTE — ASSESSMENT
[FreeTextEntry1] : The patient is a 70 year old male with an extensive smoking history who s/p Left VATS, LLLobectomy on 02/11/2020 for (pT1cN0) Stage IA3 adenocarcinoma. CT chest on 10/15 improvement of previous acute small airways inflammation. no evidence of new or metastatic disease. I have asked the patient to obtain a repeat CT of his chest in 6 months and to follow up with me in my office at that time. I have answered all of the patients questions and he understands the importance of follow up. \par \par

## 2021-10-22 NOTE — CONSULT LETTER
[Dear  ___] : Dear  [unfilled], [Courtesy Letter:] : I had the pleasure of seeing your patient, [unfilled], in my office today. [Please see my note below.] : Please see my note below. [Sincerely,] : Sincerely, [FreeTextEntry2] : Dr. Mcmahon (Pulm/Ref)\par Dr. Arnoldo Yoo (PCP)\par Dr. Lopez (Card)  [FreeTextEntry3] : Jordin Ordonez MD\par Attending Surgeon\par Division of Thoracic Surgery\par , [facility] [employer] [facility] at hospitals/[facility]\par

## 2021-10-22 NOTE — ASSESSMENT
[FreeTextEntry1] : Patient is a 71 year old male with a past medical history as above who presents for CPX, medication renewal

## 2021-10-22 NOTE — DATA REVIEWED
[No studies available for review at this time.] : No studies available for review at this time. [FreeTextEntry1] : Reviewed prior labs \par Revived Dr. Ordonez and Lester note\par Reviewed immunizations

## 2021-10-22 NOTE — PHYSICAL EXAM
[Well Nourished] : well nourished [Well Developed] : well developed [Well-Appearing] : well-appearing [Normal Sclera/Conjunctiva] : normal sclera/conjunctiva [PERRL] : pupils equal round and reactive to light [EOMI] : extraocular movements intact [Normal Outer Ear/Nose] : the outer ears and nose were normal in appearance [Normal Oropharynx] : the oropharynx was normal [Normal TMs] : both tympanic membranes were normal [No JVD] : no jugular venous distention [No Lymphadenopathy] : no lymphadenopathy [Supple] : supple [Thyroid Normal, No Nodules] : the thyroid was normal and there were no nodules present [No Respiratory Distress] : no respiratory distress  [No Accessory Muscle Use] : no accessory muscle use [Clear to Auscultation] : lungs were clear to auscultation bilaterally [Normal Rate] : normal rate  [Regular Rhythm] : with a regular rhythm [Normal S1, S2] : normal S1 and S2 [No Carotid Bruits] : no carotid bruits [No Abdominal Bruit] : a ~M bruit was not heard ~T in the abdomen [Pedal Pulses Present] : the pedal pulses are present [No Edema] : there was no peripheral edema [No Palpable Aorta] : no palpable aorta [No Extremity Clubbing/Cyanosis] : no extremity clubbing/cyanosis [Soft] : abdomen soft [Non Tender] : non-tender [Non-distended] : non-distended [No Masses] : no abdominal mass palpated [No HSM] : no HSM [Normal Bowel Sounds] : normal bowel sounds [Normal Posterior Cervical Nodes] : no posterior cervical lymphadenopathy [Normal Anterior Cervical Nodes] : no anterior cervical lymphadenopathy [No CVA Tenderness] : no CVA  tenderness [No Spinal Tenderness] : no spinal tenderness [No Joint Swelling] : no joint swelling [Grossly Normal Strength/Tone] : grossly normal strength/tone [No Rash] : no rash [Coordination Grossly Intact] : coordination grossly intact [No Focal Deficits] : no focal deficits [Normal Gait] : normal gait [Deep Tendon Reflexes (DTR)] : deep tendon reflexes were 2+ and symmetric [Speech Grossly Normal] : speech grossly normal [Normal Affect] : the affect was normal [Alert and Oriented x3] : oriented to person, place, and time [Normal Mood] : the mood was normal [Normal Insight/Judgement] : insight and judgment were intact [Right Foot Was Examined] : Right foot ~C was examined [Left Foot Was Examined] : left foot ~C was examined [] : both feet [No Acute Distress] : no acute distress [Normal Sphincter Tone] : normal sphincter tone [No Mass] : no mass [Stool Occult Blood] : stool negative for occult blood [Urethral Meatus] : meatus normal [Urinary Bladder Findings] : the bladder was normal on palpation [Scrotum] : the scrotum was normal [Rectal Exam - Seminal Vesicles] : the seminal vesicles were normal [Epididymis] : the epididymides were normal [Testes Tenderness] : no tenderness of the testes [Prostate Tenderness] : the prostate was not tender [de-identified] : with murmur  2-3/6 [de-identified] : obese [FreeTextEntry1] : slight prostate enlargement  [TWNoteComboBox3] : +2 [TWNoteComboBox4] : +2

## 2021-10-22 NOTE — PLAN
[FreeTextEntry1] : Cardio - Carotid stenosis - s/p  CEA -  DM-  Abnormal EKG- EKG reviewed no change -  Patient is currently doing well.  Has mild elevation of his systolic blood pressure. States BP at home is normal.   Continue all medications as discussed.    Monitor BP at home.  Follow up with vascular.  Advised side effects to the medication. Advised goal of LDL less than 70 \par Continue with statin therapy.  Check labs.  \par \par Pulm - Lung  Mass s/p resection-   Follow up w  pulmonology and CTSX. Repeat Ct scan in 4 months.\par Advised to conside pneumonia vax and flu shot - Pt declined today \par \par Endocrinology\par NIDDM - continue Metformin HCl 500 bid . - continue low carbohydrate/low sugar diet - check hemoglobin A1C and microalbumin.  Advised to become more active.  Refilled medication and test strips to be tested bid.  \par \par hyperlipidemia/hypertriglyceridemia - continue with lipitor and  Fenofibrate 160 mg p.o.q.d. and Lipitor ad directed - continue low cholesterol/low fat diet - check labs. refilled medication \par \par vitamin D insufficiency - continue vitamin D tablets p.o.q.d. - check vitamin D level today \par \par GI- ? GERD - halitosis - Trial of omeprazole \par -  Screening for prostates CA - Check  PSA \par Hematuria - Check urine culture and cytology\par \par Immunization Flu and  Prevnar 13 - discussed benefits of receiving the vaccine in detail.  PT still declining vax \par \par We discussed the importance of healthy lifestyles which include exercise, weight control and good diet.\par Patient's questions were answered in full detail. Advise patient if any other concerns arise to please call office to have a discussion. \par \par Patient  education  - COVID-19   Counseling and education provided to the patient.  Advised sign and symptoms of the virus.  Advised contact precautions.  Educated patient on proper hand washing and to participate in social distancing.  completed covid vax  Check spike ab - Consider covid booster \par Patient is in full awareness of the plan and agrees to it.  All pt question was answered.  \par \par Shingrix - discussed, patient to determine if vaccine is covered under medical benefits of current insurance plan and follow up for 1st dose if interested; otherwise, instructed to follow up at the pharmacy for vaccine

## 2021-10-22 NOTE — PHYSICAL EXAM
[Sclera] : the sclera and conjunctiva were normal [Neck Appearance] : the appearance of the neck was normal [] : the neck was supple [Auscultation Breath Sounds / Voice Sounds] : lungs were clear to auscultation bilaterally [Heart Rate And Rhythm] : heart rate was normal and rhythm regular [Examination Of The Chest] : the chest was normal in appearance [Chest Visual Inspection Thoracic Asymmetry] : no chest asymmetry [Edema] : there was no peripheral edema [Bowel Sounds] : normal bowel sounds [Cervical Lymph Nodes Enlarged Posterior Bilaterally] : posterior cervical [Cervical Lymph Nodes Enlarged Anterior Bilaterally] : anterior cervical [No CVA Tenderness] : no ~M costovertebral angle tenderness [No Spinal Tenderness] : no spinal tenderness [Nail Clubbing] : no clubbing  or cyanosis of the fingernails [Skin Color & Pigmentation] : normal skin color and pigmentation [Oriented To Time, Place, And Person] : oriented to person, place, and time [Impaired Insight] : insight and judgment were intact [Affect] : the affect was normal

## 2021-10-22 NOTE — HISTORY OF PRESENT ILLNESS
[FreeTextEntry1] : The patient is a 70 year old male who is s/p Left VATS, LLLobectomy on 02/11/2020 for (pT1cN0) Stage IA3 adenocarcinoma. \par \par F/u CT in November, 2020 demonstrating  two new 4 mm nodules, which had been noted to resolve on the follow up imaging in February 2021.However, found to have a new subpleural 4 mm ill-defined nodule. Advised to follow up in 4 months with repeat CT chest.\par \par CT chest on 10/15 improvement of previous acute small airways inflammation. no evidence of new or metastatic disease.\par - \par \par Patient is here today for a 4 months follow up. pt feels well and is without new complaints\par \par

## 2021-10-22 NOTE — REVIEW OF SYSTEMS
[Negative] : Heme/Lymph [Paroxysmal Nocturnal Dyspnea] : paroxysmal nocturnal dyspnea [Fever] : no fever [Chills] : no chills [Night Sweats] : no night sweats [Recent Change In Weight] : ~T no recent weight change [Discharge] : no discharge [Hearing Loss] : no hearing loss [Nasal Discharge] : no nasal discharge [Chest Pain] : no chest pain [Orthopena] : no orthopnea [Shortness Of Breath] : no shortness of breath [Wheezing] : no wheezing [Dyspnea on Exertion] : not dyspnea on exertion [Dysuria] : no dysuria [Hesitancy] : no hesitancy [Nocturia] : no nocturia [Hematuria] : no hematuria [Frequency] : no frequency [Joint Stiffness] : no joint stiffness [Joint Swelling] : no joint swelling [Dizziness] : no dizziness [Unsteady Walk] : no ataxia [Insomnia] : no insomnia [Anxiety] : no anxiety [Easy Bleeding] : no easy bleeding [Swollen Glands] : no swollen glands [FreeTextEntry4] : foul breath  [FreeTextEntry6] : mucus

## 2021-10-22 NOTE — COUNSELING
[Fall prevention counseling provided] : Fall prevention counseling provided [AUDIT-C Screening administered and reviewed] : AUDIT-C Screening administered and reviewed [Potential consequences of obesity discussed] : Potential consequences of obesity discussed [Encouraged to increase physical activity] : Encouraged to increase physical activity [Decrease Portions] : decrease portions [Good understanding] : Patient has a good understanding of disease, goals and obesity follow-up plan [FreeTextEntry2] : ADA diet, low fat and low sodium  [de-identified] : Low na diet \par Low fat diet

## 2021-10-22 NOTE — DATA REVIEWED
[FreeTextEntry1] : CT chest on 10/15 reviewed by me. improvement of previous acute small airways inflammation. no evidence of new or metastatic disease.

## 2021-10-22 NOTE — HEALTH RISK ASSESSMENT
[Very Good] : ~his/her~  mood as very good [Yes] : Yes [Monthly or less (1 pt)] : Monthly or less (1 point) [1 or 2 (0 pts)] : 1 or 2 (0 points) [Never (0 pts)] : Never (0 points) [No] : In the past 12 months have you used drugs other than those required for medical reasons? No [No falls in past year] : Patient reported no falls in the past year [0] : 2) Feeling down, depressed, or hopeless: Not at all (0) [Patient reported colonoscopy was abnormal] : Patient reported colonoscopy was abnormal [Patient/Caregiver unclear of wishes] : , patient/caregiver unclear of wishes [] : No [de-identified] : quit  [Audit-CScore] : 1 [de-identified] : ADA diet  [[id number]] : 0 [ColonoscopyDate] : 12/19 [ColonoscopyComments] : colon polyp  repeat ? [AdvancecareDate] : 10/21

## 2021-10-22 NOTE — HISTORY OF PRESENT ILLNESS
[FreeTextEntry1] : Yearly physical  [de-identified] : Mr. MASON is a 71 year  male, who present to the office for CPX.   States he feels well overall.  \par Saw Dr. Ordonez 2 days ago- Was advise to continue with Mucinex for the phlegm.   repeat CT scan 6 months \par Denies chest pain, SOB, JAMES, nausea or vomiting\par Denies getting the pneumonia vax or flu shot.  Did get the covid vax \par States BP and BG levels at home have been good - Denies hypo or hyperglycemic events\par Needs renewal on all medications

## 2021-10-24 ENCOUNTER — RX RENEWAL (OUTPATIENT)
Age: 71
End: 2021-10-24

## 2021-10-28 ENCOUNTER — NON-APPOINTMENT (OUTPATIENT)
Age: 71
End: 2021-10-28

## 2021-10-28 LAB
25(OH)D3 SERPL-MCNC: 60.9 NG/ML
ALBUMIN SERPL ELPH-MCNC: 4.9 G/DL
ALP BLD-CCNC: 58 U/L
ALT SERPL-CCNC: 19 U/L
ANION GAP SERPL CALC-SCNC: 15 MMOL/L
AST SERPL-CCNC: 17 U/L
BACTERIA UR CULT: NORMAL
BASOPHILS # BLD AUTO: 0.08 K/UL
BASOPHILS NFR BLD AUTO: 0.9 %
BILIRUB SERPL-MCNC: 0.2 MG/DL
BUN SERPL-MCNC: 25 MG/DL
CALCIUM SERPL-MCNC: 10.5 MG/DL
CHLORIDE SERPL-SCNC: 102 MMOL/L
CHOLEST SERPL-MCNC: 147 MG/DL
CO2 SERPL-SCNC: 23 MMOL/L
COVID-19 SPIKE DOMAIN ANTIBODY INTERPRETATION: POSITIVE
CREAT SERPL-MCNC: 0.98 MG/DL
EOSINOPHIL # BLD AUTO: 0.38 K/UL
EOSINOPHIL NFR BLD AUTO: 4.1 %
ESTIMATED AVERAGE GLUCOSE: 140 MG/DL
GLUCOSE SERPL-MCNC: 109 MG/DL
HBA1C MFR BLD HPLC: 6.5 %
HCT VFR BLD CALC: 47.2 %
HDLC SERPL-MCNC: 35 MG/DL
HGB BLD-MCNC: 13.8 G/DL
IMM GRANULOCYTES NFR BLD AUTO: 0.2 %
LDLC SERPL CALC-MCNC: 89 MG/DL
LYMPHOCYTES # BLD AUTO: 2.69 K/UL
LYMPHOCYTES NFR BLD AUTO: 29.2 %
MAN DIFF?: NORMAL
MCHC RBC-ENTMCNC: 26.4 PG
MCHC RBC-ENTMCNC: 29.2 GM/DL
MCV RBC AUTO: 90.4 FL
MONOCYTES # BLD AUTO: 0.73 K/UL
MONOCYTES NFR BLD AUTO: 7.9 %
NEUTROPHILS # BLD AUTO: 5.32 K/UL
NEUTROPHILS NFR BLD AUTO: 57.7 %
NONHDLC SERPL-MCNC: 112 MG/DL
PLATELET # BLD AUTO: 361 K/UL
POTASSIUM SERPL-SCNC: 4.9 MMOL/L
PROT SERPL-MCNC: 7.8 G/DL
PSA SERPL-MCNC: 0.57 NG/ML
RBC # BLD: 5.22 M/UL
RBC # FLD: 14.5 %
SARS-COV-2 AB SERPL IA-ACNC: >250 U/ML
SODIUM SERPL-SCNC: 140 MMOL/L
TRIGL SERPL-MCNC: 114 MG/DL
TSH SERPL-ACNC: 1.3 UIU/ML
URINE CYTOLOGY: NORMAL
WBC # FLD AUTO: 9.22 K/UL

## 2021-11-30 ENCOUNTER — RX RENEWAL (OUTPATIENT)
Age: 71
End: 2021-11-30

## 2021-12-27 RX ORDER — BLOOD SUGAR DIAGNOSTIC
STRIP MISCELLANEOUS
Qty: 100 | Refills: 3 | Status: ACTIVE | COMMUNITY
Start: 2019-05-01 | End: 1900-01-01

## 2022-02-02 NOTE — COUNSELING
[Fall prevention counseling provided] : Fall prevention counseling provided [AUDIT-C Screening administered and reviewed] : AUDIT-C Screening administered and reviewed [Potential consequences of obesity discussed] : Potential consequences of obesity discussed [Encouraged to increase physical activity] : Encouraged to increase physical activity [Decrease Portions] : decrease portions [Good understanding] : Patient has a good understanding of disease, goals and obesity follow-up plan [FreeTextEntry2] : ADA diet, low fat and low sodium  [de-identified] : Low na diet \par Low fat diet  Retention Suture Text: Retention sutures were placed to support the closure and prevent dehiscence.

## 2022-04-04 NOTE — H&P PST ADULT - GASTROINTESTINAL
Returned pt call. Pt is having cardiovascular study done at [facility] on 04/21/22. I gave her the fax number [phone]. And ask her to give it to the person doing her scan. Also please tell them she is having a procedure done at [facility] on 04/26/22, and they need the results of the scan BEFORE she has her procedure done at [facility]. Pt verbalized understanding of these instructions.  Thank you details… detailed exam

## 2022-04-06 ENCOUNTER — NON-APPOINTMENT (OUTPATIENT)
Age: 72
End: 2022-04-06

## 2022-04-09 ENCOUNTER — OUTPATIENT (OUTPATIENT)
Dept: OUTPATIENT SERVICES | Facility: HOSPITAL | Age: 72
LOS: 1 days | End: 2022-04-09
Payer: MEDICARE

## 2022-04-09 ENCOUNTER — APPOINTMENT (OUTPATIENT)
Dept: CT IMAGING | Facility: CLINIC | Age: 72
End: 2022-04-09
Payer: MEDICARE

## 2022-04-09 DIAGNOSIS — N43.40 SPERMATOCELE OF EPIDIDYMIS, UNSPECIFIED: Chronic | ICD-10-CM

## 2022-04-09 DIAGNOSIS — Z98.890 OTHER SPECIFIED POSTPROCEDURAL STATES: Chronic | ICD-10-CM

## 2022-04-09 DIAGNOSIS — Z00.8 ENCOUNTER FOR OTHER GENERAL EXAMINATION: ICD-10-CM

## 2022-04-09 PROCEDURE — G1004: CPT

## 2022-04-09 PROCEDURE — 71250 CT THORAX DX C-: CPT | Mod: MG

## 2022-04-09 PROCEDURE — 71250 CT THORAX DX C-: CPT | Mod: 26,MG

## 2022-04-11 ENCOUNTER — APPOINTMENT (OUTPATIENT)
Dept: VASCULAR SURGERY | Facility: CLINIC | Age: 72
End: 2022-04-11
Payer: MEDICARE

## 2022-04-11 ENCOUNTER — APPOINTMENT (OUTPATIENT)
Dept: THORACIC SURGERY | Facility: HOSPITAL | Age: 72
End: 2022-04-11
Payer: MEDICARE

## 2022-04-11 VITALS
HEIGHT: 68 IN | WEIGHT: 195 LBS | SYSTOLIC BLOOD PRESSURE: 183 MMHG | DIASTOLIC BLOOD PRESSURE: 85 MMHG | HEART RATE: 92 BPM | BODY MASS INDEX: 29.55 KG/M2

## 2022-04-11 PROCEDURE — 99214 OFFICE O/P EST MOD 30 MIN: CPT

## 2022-04-11 PROCEDURE — 93880 EXTRACRANIAL BILAT STUDY: CPT

## 2022-04-11 NOTE — HISTORY OF PRESENT ILLNESS
[FreeTextEntry1] : Patient is a 70-year-old, status post right carotid endarterectomy.  Patient currently on aspirin.  No CVA or TIA.

## 2022-04-11 NOTE — ASSESSMENT
[FreeTextEntry1] : Patient with carotid disease.  Widely patent right carotid.  Continue aspirin and atorvastatin.  Follow-up in 1 year.

## 2022-04-15 ENCOUNTER — APPOINTMENT (OUTPATIENT)
Dept: THORACIC SURGERY | Facility: CLINIC | Age: 72
End: 2022-04-15
Payer: MEDICARE

## 2022-04-15 VITALS
HEIGHT: 68 IN | DIASTOLIC BLOOD PRESSURE: 94 MMHG | BODY MASS INDEX: 29.55 KG/M2 | WEIGHT: 195 LBS | SYSTOLIC BLOOD PRESSURE: 174 MMHG | HEART RATE: 74 BPM | OXYGEN SATURATION: 98 %

## 2022-04-15 PROCEDURE — 99214 OFFICE O/P EST MOD 30 MIN: CPT

## 2022-04-15 NOTE — CONSULT LETTER
[FreeTextEntry2] : Dr. Mcmahon (Pulm/Ref)\par Dr. Arnoldo Yoo (PCP)\par Dr. Lopez (Card)  [FreeTextEntry3] : Jordin Ordonez MD\par Attending Surgeon\par Division of Thoracic Surgery\par , [employer] [facility] [employer] [facility] [place]\par

## 2022-04-15 NOTE — HISTORY OF PRESENT ILLNESS
[FreeTextEntry1] : The patient is a 71 year old male who is s/p Left VATS, LLLobectomy on 02/11/2020 for (pT1cN0) Stage IA3 adenocarcinoma. \par \par F/u CT in November, 2020 demonstrating  two new 4 mm nodules, which had been noted to resolve on the follow up imaging in February 2021.However, found to have a new subpleural 4 mm ill-defined nodule.\par \par CT chest on 06/11/2021:\par - post-op changes\par - Since 2/5/2021, the centrilobular nodules have increased. These findings can occur in the clinical setting of inflammation or alternatively infection such as atypical causes of infection.\par \par Patient had COVID -19 three months ago, also was given a course of ABX by Dr. Louie Hutchinson for a possible groin infection. \par \par CT chest on 04/09/2022:\par - Emphysema. The bilateral an lobar branching opacities are unchanged.\par - Within the right lower lobe, centered in the superior segment, are patchy groundglass opacities and a focus of consolidation (series 3 image 72).\par \par Patient is here today for a follow up. Patient c/o SOB on exertion, denies cough, chest pain, fever, chills.

## 2022-04-15 NOTE — ASSESSMENT
[FreeTextEntry1] : The patient is a 71 year old male who is s/p Left VATS, LLLobectomy on 02/11/2020 for (pT1cN0) Stage IA3 adenocarcinoma. \par \par I have reviewed the patient's medical records and diagnostic images at time of this office consultation and have made the following recommendation:\par 1. CT chest reviewed and explained to patient, New right lower lobe superior segment groundglass opacities and patchy focus of consolidation. Could be related recent COVID-19 infection, I recommended patient to return to office in 8 weeks with CT Chest without contrast. \par 2. Complete the course of Cefuroxime. \par \par I personally performed the services described in the documentation, reviewed the documentation recorded by the scribe in my presence and it accurately and completely records my words and actions.\par \par I, Jaqui Jones NP, am scribing for and the presence of SANDRA Gupta, the following sections HISTORY OF PRESENT ILLNESS, PAST MEDICAL/FAMILY/SOCIAL HISTORY; REVIEW OF SYSTEMS; VITAL SIGNS; PHYSICAL EXAM; DISPOSITION.\par

## 2022-06-08 ENCOUNTER — APPOINTMENT (OUTPATIENT)
Dept: CT IMAGING | Facility: CLINIC | Age: 72
End: 2022-06-08
Payer: MEDICARE

## 2022-06-08 ENCOUNTER — OUTPATIENT (OUTPATIENT)
Dept: OUTPATIENT SERVICES | Facility: HOSPITAL | Age: 72
LOS: 1 days | End: 2022-06-08
Payer: MEDICARE

## 2022-06-08 DIAGNOSIS — N43.40 SPERMATOCELE OF EPIDIDYMIS, UNSPECIFIED: Chronic | ICD-10-CM

## 2022-06-08 DIAGNOSIS — Z98.890 OTHER SPECIFIED POSTPROCEDURAL STATES: Chronic | ICD-10-CM

## 2022-06-08 DIAGNOSIS — C34.90 MALIGNANT NEOPLASM OF UNSPECIFIED PART OF UNSPECIFIED BRONCHUS OR LUNG: ICD-10-CM

## 2022-06-08 DIAGNOSIS — R91.1 SOLITARY PULMONARY NODULE: ICD-10-CM

## 2022-06-08 PROCEDURE — G1004: CPT

## 2022-06-08 PROCEDURE — 71250 CT THORAX DX C-: CPT | Mod: MG

## 2022-06-08 PROCEDURE — 71250 CT THORAX DX C-: CPT | Mod: 26,MG

## 2022-06-08 RX ORDER — ATORVASTATIN CALCIUM 40 MG/1
40 TABLET, FILM COATED ORAL
Qty: 90 | Refills: 0 | Status: ACTIVE | COMMUNITY
Start: 2019-12-27 | End: 1900-01-01

## 2022-06-08 RX ORDER — METFORMIN HYDROCHLORIDE 500 MG/1
500 TABLET, COATED ORAL
Qty: 360 | Refills: 0 | Status: ACTIVE | COMMUNITY
Start: 2019-11-04 | End: 1900-01-01

## 2022-06-10 PROBLEM — R91.1 LUNG NODULE: Status: ACTIVE | Noted: 2020-01-02

## 2022-06-13 RX ORDER — EZETIMIBE 10 MG/1
10 TABLET ORAL
Qty: 90 | Refills: 0 | Status: ACTIVE | COMMUNITY
Start: 2021-06-17 | End: 1900-01-01

## 2022-06-14 ENCOUNTER — APPOINTMENT (OUTPATIENT)
Dept: INTERNAL MEDICINE | Facility: CLINIC | Age: 72
End: 2022-06-14

## 2022-06-15 ENCOUNTER — APPOINTMENT (OUTPATIENT)
Dept: THORACIC SURGERY | Facility: CLINIC | Age: 72
End: 2022-06-15
Payer: MEDICARE

## 2022-06-15 DIAGNOSIS — R91.1 SOLITARY PULMONARY NODULE: ICD-10-CM

## 2022-06-15 PROCEDURE — 99213 OFFICE O/P EST LOW 20 MIN: CPT

## 2022-06-20 ENCOUNTER — APPOINTMENT (OUTPATIENT)
Dept: VASCULAR SURGERY | Facility: CLINIC | Age: 72
End: 2022-06-20

## 2022-06-23 NOTE — PHYSICAL EXAM
[Sclera] : the sclera and conjunctiva were normal [Neck Appearance] : the appearance of the neck was normal [Auscultation Breath Sounds / Voice Sounds] : lungs were clear to auscultation bilaterally [Heart Rate And Rhythm] : heart rate was normal and rhythm regular [Examination Of The Chest] : the chest was normal in appearance [Edema] : there was no peripheral edema [Bowel Sounds] : normal bowel sounds [Abdomen Soft] : soft [No CVA Tenderness] : no ~M costovertebral angle tenderness [No Spinal Tenderness] : no spinal tenderness [Nail Clubbing] : no clubbing  or cyanosis of the fingernails [Skin Color & Pigmentation] : normal skin color and pigmentation [No Focal Deficits] : no focal deficits [Oriented To Time, Place, And Person] : oriented to person, place, and time [Impaired Insight] : insight and judgment were intact [Affect] : the affect was normal

## 2022-06-23 NOTE — CONSULT LETTER
[Dear  ___] : Dear  [unfilled], [Courtesy Letter:] : I had the pleasure of seeing your patient, [unfilled], in my office today. [Please see my note below.] : Please see my note below. [Sincerely,] : Sincerely, [FreeTextEntry2] : Dr. Mcmahon (Pulm/Ref)\par Dr. Arnoldo Yoo (PCP)\par Dr. Lopez (Card)  [FreeTextEntry3] : Jordin Ordonez MD\par Attending Surgeon\par Division of Thoracic Surgery\par , [facility] [employer] [facility] at [facility]\par

## 2022-06-23 NOTE — ASSESSMENT
[FreeTextEntry1] : The patient is a 71 year old male who is s/p Left VATS, left lower lobe Lobectomy on 02/11/2020 for (pT1cN0) Stage IA3 adenocarcinoma. Most recent CT of the chest shows resolution of the previously new RLL opacity and diffuse tree-in-bud opacities. No evidence of new or recurrent disease. I have asked the patient to obtain a repeat CT of the chest in 6 months and to follow up with me in my office at that time. I have answered all of his questions and he understands the importance of follow up. \par \par

## 2022-06-23 NOTE — HISTORY OF PRESENT ILLNESS
[FreeTextEntry1] : The patient is a 71 year old male who is s/p Left VATS, LLLobectomy on 02/11/2020 for (pT1cN0) Stage IA3 adenocarcinoma. \par \par CT chest on 06/08/2022:\par - Interval resolution of the previously new irregular right lower lobe nodular opacity.\par - Persistent diffuse tree-in-bud nodules throughout both lungs related to small airways disease.\par - Status post left lower lobectomy. No recurrent disease.\par \par Patient is here today for a follow up. Pt feels well without new complaints.

## 2022-09-13 ENCOUNTER — RX RENEWAL (OUTPATIENT)
Age: 72
End: 2022-09-13

## 2022-09-13 RX ORDER — OMEPRAZOLE 20 MG/1
20 CAPSULE, DELAYED RELEASE ORAL
Qty: 30 | Refills: 0 | Status: ACTIVE | COMMUNITY
Start: 2021-10-22 | End: 1900-01-01

## 2022-12-14 ENCOUNTER — RESULT REVIEW (OUTPATIENT)
Age: 72
End: 2022-12-14

## 2022-12-14 ENCOUNTER — OUTPATIENT (OUTPATIENT)
Dept: OUTPATIENT SERVICES | Facility: HOSPITAL | Age: 72
LOS: 1 days | End: 2022-12-14
Payer: MEDICARE

## 2022-12-14 ENCOUNTER — APPOINTMENT (OUTPATIENT)
Dept: CT IMAGING | Facility: CLINIC | Age: 72
End: 2022-12-14

## 2022-12-14 DIAGNOSIS — Z98.890 OTHER SPECIFIED POSTPROCEDURAL STATES: Chronic | ICD-10-CM

## 2022-12-14 DIAGNOSIS — N43.40 SPERMATOCELE OF EPIDIDYMIS, UNSPECIFIED: Chronic | ICD-10-CM

## 2022-12-14 DIAGNOSIS — C34.90 MALIGNANT NEOPLASM OF UNSPECIFIED PART OF UNSPECIFIED BRONCHUS OR LUNG: ICD-10-CM

## 2022-12-14 PROCEDURE — 71250 CT THORAX DX C-: CPT | Mod: 26,MH

## 2022-12-14 PROCEDURE — 71250 CT THORAX DX C-: CPT

## 2022-12-21 ENCOUNTER — APPOINTMENT (OUTPATIENT)
Dept: THORACIC SURGERY | Facility: CLINIC | Age: 72
End: 2022-12-21

## 2022-12-21 VITALS
SYSTOLIC BLOOD PRESSURE: 185 MMHG | BODY MASS INDEX: 29.55 KG/M2 | HEART RATE: 77 BPM | OXYGEN SATURATION: 96 % | DIASTOLIC BLOOD PRESSURE: 85 MMHG | WEIGHT: 195 LBS | TEMPERATURE: 98 F | HEIGHT: 68 IN

## 2022-12-21 PROCEDURE — 99213 OFFICE O/P EST LOW 20 MIN: CPT

## 2022-12-21 NOTE — PHYSICAL EXAM
[Sclera] : the sclera and conjunctiva were normal [Neck Appearance] : the appearance of the neck was normal [] : the neck was supple [Auscultation Breath Sounds / Voice Sounds] : lungs were clear to auscultation bilaterally [Heart Rate And Rhythm] : heart rate was normal and rhythm regular [Murmurs] : no murmurs [Examination Of The Chest] : the chest was normal in appearance [Edema] : there was no peripheral edema [Bowel Sounds] : normal bowel sounds [No CVA Tenderness] : no ~M costovertebral angle tenderness [No Spinal Tenderness] : no spinal tenderness [Nail Clubbing] : no clubbing  or cyanosis of the fingernails [Skin Color & Pigmentation] : normal skin color and pigmentation [Oriented To Time, Place, And Person] : oriented to person, place, and time [Impaired Insight] : insight and judgment were intact [Affect] : the affect was normal

## 2022-12-22 NOTE — ASSESSMENT
[FreeTextEntry1] : The patient is a 71 year old male who is s/p Left VATS, left lower lobe Lobectomy on 02/11/2020 for (pT1cN0) Stage IA3 adenocarcinoma. Currently doing well. The most recent CT of the chest shows no evidence of new or recurrent disease. I have asked the patient to obtain a repeat CT of the chest in 9 months and to follow up with me in my office at that time.  I have answered all of his questions and he understands the importance of follow up. \par

## 2022-12-22 NOTE — CONSULT LETTER
[Dear  ___] : Dear  [unfilled], [Courtesy Letter:] : I had the pleasure of seeing your patient, [unfilled], in my office today. [Please see my note below.] : Please see my note below. [Sincerely,] : Sincerely, [FreeTextEntry2] : Dr. Mcmahon (Pulm/Ref)\par Dr. Arnoldo Yoo (PCP)\par Dr. Lopez (Card)  [FreeTextEntry3] : Jordin Ordonez MD\par Attending Surgeon\par Division of Thoracic Surgery\par , [facility] [employer] [facility] at [facility]\par

## 2022-12-22 NOTE — HISTORY OF PRESENT ILLNESS
[FreeTextEntry1] : The patient is a 72 year old male who is s/p Left VATS, LLL Lobectomy on 02/11/2020 for (pT1cN0) Stage IA3 adenocarcinoma. \par \par Patient is here today for a 6 month follow up. Pt feels well without complaints\par

## 2023-01-01 NOTE — ASU PREOP CHECKLIST - ASSESSMENT, HISTORY & PHYSICAL COMPLETED AND ON MEDICAL RECORD
Plan:   - routine  care, strict I and O, daily weights  - bilirubin prior to discharge   - hearing screen  - CCHD,  screen  - parental education and anticipatory guidance done

## 2023-03-08 NOTE — H&P PST ADULT - TEACHING/LEARNING LEARNING PREFERENCES
Cardiology Follow Up    Lakia Alvares  [id number]  1942  [facility] [address] [address]      1  HFrEF (heart failure with reduced ejection fraction) [facility])  Ambulatory Referral to Cardiology      2  Coronary artery disease involving native coronary artery of native heart, unspecified whether angina present        3  Primary hypertension        4  Hyperlipidemia, unspecified hyperlipidemia type            Discussion/Summary:  1  Perioperative restratification  2  Dyspnea on exertion-patient notes baseline if not somewhat improved since last hospitalization  3  Recent iatrogenic pneumothorax s/p chest tube placement now removed  4  Ischemic cardiomyopathy LVEF 40% on recent transthoracic echocardiogram  5  Hypertension  6  Hyperlipidemia  7  COPD now on 3 L nasal cannula oxygen  8  Aortic stenosis  9  Mitral regurgitation    -Transthoracic echocardiogram 2/3/2023 showing left-ventricular systolic function moderately reduced estimated LVEF 40% with global hypokinesis mild to moderately dilated left atrium with mild aortic regurgitation and mild aortic stenosis, moderate mitral regurgitation and mild tricuspid regurgitation with IVC  normal in size  -After discussion with patient and family members present patient understands he is high risk for operative procedures at this time he understands and is accepting of these risks and wishes to proceed if possible with surgery for treatment for his prostate cancer    Patient should be seen and evaluated by pulmonology and if from their standpoint is able to undergo procedure then will be acceptable risk from cardiac standpoint as well  -Patient will continue metoprolol succinate 12 5 mg twice daily  -We will give patient referral to heart failure team for additional assistance with initiation/up titration of medical therapy  -Patient has documentation of swelling and cough with lisinopril and while cough is potential side effect swelling if true would preclude him from ACE inhibitors or angiotensin receptor blockers  -We will see patient in 1 month or sooner if necessary  -Patient counseled if he were to have any warning or alarm type symptoms he is to seek emergency medical care immediately  -Patient will continue furosemide 40 mg daily and Crestor 40 mg daily along with aspirin 81 mg daily  -Patient counseled on following fluid and salt restricted diet to less than 1800 mg of sodium daily less than 2000 mL of fluid daily with monitoring daily weights  History of Present Illness:  -Patient is a 82-year-old male with hypertension, hyperlipidemia, obesity, COPD with chronic nasal cannula oxygen recently increased from 2 L nasal cannula to 3 L nasal cannula along with chronic antibiotic therapy as well as obstructive sleep apnea compliant with CPAP therapy however this is on hold recently due to iatrogenic pneumothorax for which patient was recently hospitalized in February 2023 along with known coronary artery disease history of MI in 1995 with PCI to RCA, stenting in 1999 and 2001 involving RCA then again in 2003 with stenting to the LAD with cardiac catheterization in March 2021 showing nonobstructive coronary artery disease with no need for revascularization at that time along with asymptomatic PVCs who was also hospitalized in January 2023 for COVID-19 and chest pain    During hospitalization metoprolol was discontinued however then later he presented for chest discomfort and while troponins were trended and negative x3 and ECG had baseline nonspecific ST abnormalities patient was able to be discharged to outpatient follow-up   -Unfortunately this is caused issue during multiple hospitalizations with potential spacer placement for radiation therapy and patient presents to the office today for perioperative restratification   -Patient denies any chest pain recurrence apart from some right rib discomfort where chest tube was placed  He is at his chronic baseline shortness of breath but denies orthopnea or bendopnea or lower extremity edema    He states that on exertion while he does have some shortness of breath this is improved with nasal cannula oxygen and steroid therapy and denies any active chest discomfort similar to previous need for cardiac catheterization in the past   Patient notes blood pressures at home are very well controlled even potentially on the low side and this is limited up titration of medical therapy in the past     Patient Active Problem List   Diagnosis   • Hyperlipidemia   • Coronary artery disease involving native coronary artery of native heart without angina pectoris   • Left carotid artery occlusion   • Hypertension   • COPD with exacerbation ([facility] [address] )   • Oxygen dependent   • Depression, recurrent ([facility] [address] )   • S/P carotid endarterectomy   • Stented coronary artery   • Vertigo   • Anisometropia   • Osteoarthritis of spinal facet joint   • Chronic ischemic heart disease   • Chronic diastolic (congestive) heart failure (HCC)   • Diverticular disease of colon   • Dry eye syndrome   • Gastroesophageal reflux disease   • Hearing loss   • Elevated PSA   • Hypoxia   • Lens replaced by other means   • Lumbar radiculopathy   • Multinodular goiter   • Nuclear senile cataract   • Obesity   • Occlusion and stenosis of carotid artery   • Osteoarthritis of hip   • Prediabetes   • Psychosexual dysfunction with inhibited sexual excitement   • LAMBERTO on CPAP   • Solitary pulmonary nodule   • Thyroid nodule   • Guyon syndrome, left   • Costochondral chest pain   • Chest pain   • Abnormal nuclear stress test   • Right hip pain   • Primary insomnia   • Chronic right-sided thoracic back pain   • Stage 3a chronic kidney disease (HCC)   • Hoarseness or changing voice   • Chronic bilateral low back pain with right-sided sciatica   • Mid back pain   • Thoracic radiculopathy   • Chronic pain syndrome   • Urinary frequency   • Incomplete bladder emptying   • Intercostal neuralgia   • Cubital tunnel syndrome on left   • Carpal tunnel syndrome on left   • Elevated MCV   • Prostate cancer (HCC)   • Acute on chronic respiratory failure with hypoxia (HCC)   • Obstructive sleep apnea syndrome in adult   • Sensorineural hearing loss, bilateral   • RSV (respiratory syncytial virus infection)   • Right lower quadrant abdominal pain   • COVID   • Ambulatory dysfunction   • COPD (chronic obstructive pulmonary disease) (HCC)   • Hypokalemia   • Left leg pain   • Transient right leg weakness   • Pneumothorax on right   • Leukocytosis     Past Medical History:   Diagnosis Date   • Bilateral carotid artery stenosis    • Cancer (HCC)     skin   • Chronic diastolic heart failure (HCC)    • Chronic ischemic heart disease    • Chronic kidney disease     stage 3   • Colon polyp    • COPD (chronic obstructive pulmonary disease) (HCC)    • Coronary artery disease     hx stents, MI, PCI   • COVID 11/2021   • CPAP (continuous positive airway pressure) dependence    • Emphysema of lung ([facility] [address] ) 1995    started   • Hearing loss    • History of transfusion 1995   • Hyperlipidemia    • Hypertension    • MI (myocardial infarction) ([facility] [address] ) 1995   • Myocardial infarction ([facility] [address] ) 1995   • Pneumonia    • Pro[facility] cancer ([facility] )    • RSV (respiratory syncytial virus infection) 10/2022   • S/P carotid endarterectomy    • Shortness of breath     O2 2 l/nc PRN   • Sleep apnea    • Sleep apnea, obstructive    • Stented coronary artery      Social History     Socioeconomic History   • Marital status: /Civil Union     Spouse name: Not on file   • Number of children: Not on file   • Years of education: Not on file   • Highest education level: Not on file   Occupational History   • Not on file   Tobacco Use   • Smoking status: Former     Packs/day: 2 00     Years: 35 00     Pack years: 70 00     Types: Cigarettes     Start date: 1960     Quit date: 1995     Years since quittin 6   • Smokeless tobacco: Never   • Tobacco comments:     stopped    Vaping Use   • Vaping Use: Never used   Substance and Sexual Activity   • Alcohol use: Never   • Drug use: Never   • Sexual activity: Yes     Partners: Female   Other Topics Concern   • Not on file   Social History Narrative   • Not on file     Social Determinants of Health     Financial Resource Strain: Not on file   Food Insecurity: No Food Insecurity   • Worried About [address] in the Last Year: Never true   • Ran Out of Food in the Last Year: Never true   Transportation Needs: No Transportation Needs   • Lack of Transportation (Medical): No   • Lack of Transportation (Non-Medical): No   Physical Activity: Not on file   Stress: Not on file   Social Connections: Not on file   Intimate Partner Violence: Not on file   Housing Stability: Low Risk    • Unable to Pay for Housing in the Last Year: No   • Number of Places Lived in the Last Year: 1   • Unstable Housing in the Last Year: No      Family History   Problem Relation Age of Onset   • Heart attack Mother    • Dementia Mother    • Heart failure Mother             • No Known Problems Father      Past Surgical History:   Procedure Laterality Date   • APPENDECTOMY     • CARDIAC CATHETERIZATION  2021    left main with no significant disease, proximal LAD with 10% stenosis at the site of prior stent, left circumflex artery with minimal luminal irregularities mid RCA with 50% stenosis at site of prior stent and PL segment with distal disease supplied by collaterals from the distal circumflex with no significant CAD requiring revascularization at that time     • CATARACT EXTRACTION, BILATERAL Bilateral    • COLONOSCOPY     • CORONARY ANGIOPLASTY WITH STENT PLACEMENT      RCA   • CORONARY ANGIOPLASTY WITH STENT PLACEMENT      RCA   • CORONARY ANGIOPLASTY WITH STENT PLACEMENT  2003    LAD   • EYE SURGERY     • WY BX PROSTATE STRTCTC SATURATION SAMPLING IMG GID N/A 09/13/2022    Procedure: TRANSPERINEAL MRI FUSION  BIOPSY PROSTATE;  Surgeon: Sharyn Omer MD;  Location: [facility];  Service: Urology   • WY NEUROPLASTY &/TRANSPOS MEDIAN NRV Zoë Gusman Left 07/22/2022    Procedure: RELEASE CARPAL TUNNEL;  Surgeon: Giorgio Carias MD;  Location: [facility];  Service: Orthopedics   • WY NEUROPLASTY &/TRANSPOSITION ULNAR NERVE ELBOW Left 07/22/2022    Procedure: RELEASE CUBITAL TUNNEL;  Surgeon: Giorgio Carias MD;  Location[facility];  Service: Orthopedics   • WY NEUROPLASTY &/TRANSPOSITION ULNAR NERVE WRIST Left 07/22/2022    Procedure: Janora Seats RELEASE;  Surgeon: Giorgio Carias MD;  Location: [facility];  Service: Orthopedics   • SKIN CANCER EXCISION  2012    chin-per pt, basal cell  also right ankle       Current Outpatient Medications:   •  albuterol (2 5 mg/3 mL) 0 083 % nebulizer solution, Take 3 mL (2 5 mg total) by nebulization every 6 (six) hours as needed for wheezing or shortness of breath, Disp: 60 mL, Rfl: 5  •  aspirin 81 mg chewable tablet, CHEW ONE TABLET BY MOUTH EVERY DAY, Disp: , Rfl:   •  azithromycin (ZITHROMAX) 500 MG tablet, Take 1 tablet (500 mg total) by mouth 3 (three) times a week, Disp: 12 tablet, Rfl: 2  •  budesonide (PULMICORT) 0 5 mg/2 mL nebulizer solution, Take 2 mL (0 5 mg total) by nebulization every 12 (twelve) hours Rinse mouth after use , Disp: 120 mL, Rfl: 0  •  dextromethorphan-guaiFENesin (ROBITUSSIN DM)  mg/5 mL syrup, Take 5 mL by mouth 3 (three) times a day as needed for cough, Disp: 354 mL, Rfl: 0  •  famotidine (PEPCID) 20 mg tablet, Take 1 tablet (20 mg total) by mouth daily at bedtime, Disp: 100 tablet, Rfl: 1  •  fluticasone (FLONASE) 50 mcg/act nasal spray, into each nostril as needed , Disp: , Rfl:   •  furosemide (LASIX) 40 mg tablet, Take 1 tablet (40 mg total) by mouth daily, Disp: 100 tablet, Rfl: 3  •  gabapentin (NEURONTIN) 300 mg capsule, Take 1 capsule (300 mg total) by mouth 2 (two) times a day, Disp: , Rfl:   •  metoprolol succinate (TOPROL-XL) 25 mg 24 hr tablet, Take 0 5 tablets (12 5 mg total) by mouth 2 (two) times a day, Disp: 90 tablet, Rfl: 0  •  oxygen gas, Inhale 2 L/min continuous   2LPM at rest and 3LPM with activity per D Cedric FANG notes  Indications: SOB, pulmonary edema suspected, Disp: , Rfl:   •  potassium chloride (Klor-Con) 10 mEq tablet, Take 2 tablets (20 mEq total) by mouth daily, Disp: 200 tablet, Rfl: 3  •  rosuvastatin (CRESTOR) 40 MG tablet, TAKE 1 TABLET DAILY (Patient taking differently: Take 40 mg by mouth daily at bedtime), Disp: 100 tablet, Rfl: 3  •  Tiotropium Bromide Monohydrate (SPIRIVA RESPIMAT IN), Inhale every morning, Disp: , Rfl:   •  Blood Pressure Monitor KIT, Use daily (Patient taking differently: Use 40 mg daily), Disp: 1 kit, Rfl: 0  Allergies   Allergen Reactions   • Lisinopril Swelling and Cough   • Tetanus Antitoxin Anaphylaxis   • Tetanus Toxoid Anaphylaxis and Swelling         Labs:  Admission on 02/20/2023, Discharged on 02/23/2023   Component Date Value   • Ventricular Rate 02/20/2023 91    • Atrial Rate 02/20/2023 91    • NV Interval 02/20/2023 156    • QRSD Interval 02/20/2023 92    • QT Interval 02/20/2023 372    • QTC Interval 02/20/2023 457    • P Axis 02/20/2023 87    • QRS Axis 02/20/2023 74    • T Wave Axis 02/20/2023 53    • WBC 02/20/2023 28 19 (H)    • RBC 02/20/2023 4 86    • Hemoglobin 02/20/2023 15 9    • Hematocrit 02/20/2023 50 7 (H)    • MCV 02/20/2023 104 (H)    • MCH 02/20/2023 32 7    • MCHC 02/20/2023 31 4    • RDW 02/20/2023 13 7    • MPV 02/20/2023 11 9    • Platelets 78/36/2825 210    • Sodium 02/20/2023 140    • Potassium 02/20/2023 3 6    • Chloride 02/20/2023 96    • CO2 02/20/2023 39 (H)    • ANION GAP 02/20/2023 5    • BUN 02/20/2023 26 (H)    • Creatinine 02/20/2023 1 29    • Glucose 02/20/2023 196 (H)    • Calcium 02/20/2023 9 3    • eGFR 02/20/2023 52    • Ventricular Rate 02/20/2023 87    • Atrial Rate 02/20/2023 87    • PA Interval 02/20/2023 152    • QRSD Interval 02/20/2023 94    • QT Interval 02/20/2023 368    • QTC Interval 02/20/2023 442    • P Axis 02/20/2023 78    • QRS Axis 02/20/2023 69    • T Wave Axis 02/20/2023 84    • Segmented % 02/20/2023 89 (H)    • Bands % 02/20/2023 3    • Lymphocytes % 02/20/2023 2 (L)    • Monocytes % 02/20/2023 6    • Eosinophils, % 02/20/2023 0    • Basophils % 02/20/2023 0    • Absolute Neutrophils 02/20/2023 25 93 (H)    • Lymphocytes Absolute 02/20/2023 0 56 (L)    • Monocytes Absolute 02/20/2023 1 69 (H)    • Eosinophils Absolute 02/20/2023 0 00    • Basophils Absolute 02/20/2023 0 00    • RBC Morphology 02/20/2023 Present    • Anisocytosis 02/20/2023 Present    • [place] Cells 02/20/2023 Present    • Tear Drop Cells 02/20/2023 Present    • Platelet Estimate 48/21/6120 Adequate    • Large Platelet 19/94/7510 Present    • POC Glucose 02/21/2023 142 (H)    • Sodium 02/21/2023 139    • Potassium 02/21/2023 4 0    • Chloride 02/21/2023 101    • CO2 02/21/2023 36 (H)    • ANION GAP 02/21/2023 2 (L)    • BUN 02/21/2023 28 (H)    • Creatinine 02/21/2023 1 12    • Glucose 02/21/2023 161 (H)    • Calcium 02/21/2023 8 4    • eGFR 02/21/2023 62    • WBC 02/21/2023 24 89 (H)    • RBC 02/21/2023 4 42    • Hemoglobin 02/21/2023 14 4    • Hematocrit 02/21/2023 45 6    • MCV 02/21/2023 103 (H)    • MCH 02/21/2023 32 6    • MCHC 02/21/2023 31 6    • RDW 02/21/2023 13 8    • MPV 02/21/2023 10 8    • Platelets 89/12/4531 172    • nRBC 02/21/2023 0    • Neutrophils Relative 02/21/2023 88 (H)    • Immat GRANS % 02/21/2023 0    • Lymphocytes Relative 02/21/2023 3 (L)    • Monocytes Relative 02/21/2023 9    • Eosinophils Relative 02/21/2023 0    • Basophils Relative 02/21/2023 0    • Neutrophils Absolute 02/21/2023 21 94 (H)    • Immature Grans Absolute 02/21/2023 0 11    • Lymphocytes Absolute 02/21/2023 0 64    • Monocytes Absolute 02/21/2023 2 17 (H)    • Eosinophils Absolute 02/21/2023 0 00    • Basophils Absolute 02/21/2023 0 03    • Magnesium 02/21/2023 2 2    • Phosphorus 02/21/2023 2 9    • POC Glucose 02/21/2023 144 (H)    • POC Glucose 02/21/2023 203 (H)    • POC Glucose 02/21/2023 128    • POC Glucose 02/21/2023 149 (H)    • WBC 02/22/2023 22 26 (H)    • RBC 02/22/2023 4 53    • Hemoglobin 02/22/2023 15 0    • Hematocrit 02/22/2023 46 9    • MCV 02/22/2023 104 (H)    • MCH 02/22/2023 33 1    • MCHC 02/22/2023 32 0    • RDW 02/22/2023 13 6    • Platelets 81/84/3898 180    • MPV 02/22/2023 11 7    • Sodium 02/22/2023 140    • Potassium 02/22/2023 4 0    • Chloride 02/22/2023 99    • CO2 02/22/2023 40 (H)    • ANION GAP 02/22/2023 1 (L)    • BUN 02/22/2023 23    • Creatinine 02/22/2023 1 07    • Glucose 02/22/2023 117    • Calcium 02/22/2023 9 0    • eGFR 02/22/2023 66    • POC Glucose 02/22/2023 117    • POC Glucose 02/22/2023 237 (H)    • POC Glucose 02/22/2023 138    • POC Glucose 02/22/2023 142 (H)    • WBC 02/23/2023 20 29 (H)    • RBC 02/23/2023 4 53    • Hemoglobin 02/23/2023 14 7    • Hematocrit 02/23/2023 46 7    • MCV 02/23/2023 103 (H)    • MCH 02/23/2023 32 5    • MCHC 02/23/2023 31 5    • RDW 02/23/2023 13 6    • MPV 02/23/2023 11 3    • Platelets 95/08/7177 190    • nRBC 02/23/2023 0    • Neutrophils Relative 02/23/2023 87 (H)    • Immat GRANS % 02/23/2023 1    • Lymphocytes Relative 02/23/2023 3 (L)    • Monocytes Relative 02/23/2023 9    • Eosinophils Relative 02/23/2023 0    • Basophils Relative 02/23/2023 0    • Neutrophils Absolute 02/23/2023 17 81 (H)    • Immature Grans Absolute 02/23/2023 0 17    • Lymphocytes Absolute 02/23/2023 0 54 (L)    • Monocytes Absolute 02/23/2023 1 74 (H)    • Eosinophils Absolute 02/23/2023 0 00    • Basophils Absolute 02/23/2023 0 03    • Sodium 02/23/2023 139    • Potassium 02/23/2023 4 1    • Chloride 02/23/2023 98    • CO2 02/23/2023 39 (H)    • ANION GAP 02/23/2023 2 (L)    • BUN 02/23/2023 24    • Creatinine 02/23/2023 0 99    • Glucose 02/23/2023 125    • Calcium 02/23/2023 8 7    • eGFR 02/23/2023 72    • Magnesium 02/23/2023 2 3    • Phosphorus 02/23/2023 4 0    • POC Glucose 02/23/2023 121    • POC Glucose 02/23/2023 152 (H)    Hospital Outpatient Visit on 02/03/2023   Component Date Value   • A4C EF 02/03/2023 48    • LVOT stroke volume 02/03/2023 81 84    • LVOT stroke volume index 02/03/2023 66 11    • LV Diastolic Volume (BP) 56/45/6641 888    • LV Systolic Volume (BP) 35/04/8734 79    • EF 02/03/2023 47    • LVIDd 02/03/2023 5 50    • LVIDS 02/03/2023 5 00    • IVSd 02/03/2023 0 90    • LVPWd 02/03/2023 0 90    • FS 02/03/2023 9    • MV E' Tissue Velocity Se* 02/03/2023 5    • AV LVOT peak gradient 02/03/2023 3    • LVOT peak VTI 02/03/2023 21 54    • LVOT peak vega 02/03/2023 0 8    • RVOT peak VTI 02/03/2023 15 46    • RVID d 02/03/2023 2 5    • Tricuspid annular plane * 02/03/2023 1 90    • RVOT VMEAN 02/03/2023 0 53    • RVOT PMEAN 02/03/2023 1    • RVOT PMAX 02/03/2023 2    • LA size 02/03/2023 5 2    • LA length (A2C) 02/03/2023 4 80    • LVOT diameter 02/03/2023 2 2    • Aortic valve peak veloci* 02/03/2023 2 16    • Ao VTI 02/03/2023 52 28    • AV mean gradient 02/03/2023 13    • LVOT mn grad 02/03/2023 2 0    • AV peak gradient 02/03/2023 19    • AV area by cont VTI 02/03/2023 1 6    • AV area peak vega 02/03/2023 1 4    • MV VTI RETROGRADE 02/03/2023 192 8    • MV mean gradient retrogr* 02/03/2023 86    • MR PG 02/03/2023 144    • TR Peak Vega 02/03/2023 2 9    • Triscuspid Valve Regurgi* 02/03/2023 33 0    • RVOT peak vega 02/03/2023 0 77    • Ao root 02/03/2023 3 60    • Aortic valve mean veloci* 02/03/2023 17 20    • Mitral regurgitation pea* 02/03/2023 6 00    • Mitral valve mean inflow* 02/03/2023 4 33    • Tricuspid valve peak reg* 02/03/2023 2 87    • Left ventricular stroke * 02/03/2023 53 00    • IVS 02/03/2023 0 9    • LEFT VENTRICLE SYSTOLIC * 29/96/5308 596    • LV DIASTOLIC VOLUME (MOD* 80/05/4533 173    • Left Atrium Area-systoli* 02/03/2023 26 4    • Left Atrium Area-systoli* 02/03/2023 15 6    • LVSV, 2D 02/03/2023 53    • LV EF 02/03/2023 40    • LVOT area 02/03/2023 3 80    • DVI 02/03/2023 0 37    • AV valve area 02/03/2023 1 57    Appointment on 01/27/2023   Component Date Value   • Vitamin B-12 01/27/2023 543    • Sodium 01/27/2023 142    • Potassium 01/27/2023 3 8    • Chloride 01/27/2023 102    • CO2 01/27/2023 35 (H)    • ANION GAP 01/27/2023 5    • BUN 01/27/2023 15    • Creatinine 01/27/2023 1 11    • Glucose, Fasting 01/27/2023 103 (H)    • Calcium 01/27/2023 9 8    • AST 01/27/2023 27    • ALT 01/27/2023 31    • Alkaline Phosphatase 01/27/2023 77    • Total Protein 01/27/2023 6 8    • Albumin 01/27/2023 3 8    • Total Bilirubin 01/27/2023 0 78    • eGFR 01/27/2023 63    • WBC 01/27/2023 6 79    • RBC 01/27/2023 5 07    • Hemoglobin 01/27/2023 16 4    • Hematocrit 01/27/2023 50 5 (H)    • MCV 01/27/2023 100 (H)    • MCH 01/27/2023 32 3    • MCHC 01/27/2023 32 5    • RDW 01/27/2023 14 8    • MPV 01/27/2023 10 9    • Platelets 99/05/5172 164    • nRBC 01/27/2023 0    • Neutrophils Relative 01/27/2023 69    • Immat GRANS % 01/27/2023 0    • Lymphocytes Relative 01/27/2023 14    • Monocytes Relative 01/27/2023 12    • Eosinophils Relative 01/27/2023 4    • Basophils Relative 01/27/2023 1    • Neutrophils Absolute 01/27/2023 4 69    • Immature Grans Absolute 01/27/2023 0 02    • Lymphocytes Absolute 01/27/2023 0 98    • Monocytes Absolute 01/27/2023 0 79    • Eosinophils Absolute 01/27/2023 0 24    • Basophils Absolute 01/27/2023 0 07    • TSH 3RD GENERATON 01/27/2023 0 860    • Hemoglobin A1C 01/27/2023 5 9 (H)    • EAG 01/27/2023 123    • Cholesterol 01/27/2023 132    • Triglycerides 01/27/2023 90    • HDL, Direct 01/27/2023 69    • LDL Calculated 01/27/2023 45    Admission on 01/18/2023, Discharged on 01/19/2023 Component Date Value   • WBC 01/18/2023 9 82    • RBC 01/18/2023 4 81    • Hemoglobin 01/18/2023 15 5    • Hematocrit 01/18/2023 49 1    • MCV 01/18/2023 102 (H)    • MCH 01/18/2023 32 2    • MCHC 01/18/2023 31 6    • RDW 01/18/2023 14 8    • MPV 01/18/2023 10 8    • Platelets 39/01/4020 212    • nRBC 01/18/2023 0    • Neutrophils Relative 01/18/2023 63    • Immat GRANS % 01/18/2023 1    • Lymphocytes Relative 01/18/2023 18    • Monocytes Relative 01/18/2023 13 (H)    • Eosinophils Relative 01/18/2023 4    • Basophils Relative 01/18/2023 1    • Neutrophils Absolute 01/18/2023 6 33    • Immature Grans Absolute 01/18/2023 0 07    • Lymphocytes Absolute 01/18/2023 1 73    • Monocytes Absolute 01/18/2023 1 27 (H)    • Eosinophils Absolute 01/18/2023 0 36    • Basophils Absolute 01/18/2023 0 06    • Sodium 01/18/2023 141    • Potassium 01/18/2023 3 4 (L)    • Chloride 01/18/2023 99    • CO2 01/18/2023 37 (H)    • ANION GAP 01/18/2023 5    • BUN 01/18/2023 15    • Creatinine 01/18/2023 1 35 (H)    • Glucose 01/18/2023 111    • Calcium 01/18/2023 8 9    • AST 01/18/2023 23    • ALT 01/18/2023 17    • Alkaline Phosphatase 01/18/2023 73    • Total Protein 01/18/2023 6 0 (L)    • Albumin 01/18/2023 3 8    • Total Bilirubin 01/18/2023 0 87    • eGFR 01/18/2023 49    • Magnesium 01/18/2023 2 2    • BNP 01/18/2023 41    • hs TnI 0hr 01/18/2023 27    • hs TnI 2hr 01/18/2023 21    • Delta 2hr hsTnI 01/18/2023 -6    • hs TnI 4hr 01/18/2023 20    • Delta 4hr hsTnI 01/18/2023 -7    • Ventricular Rate 01/18/2023 84    • Atrial Rate 01/18/2023 84    • CO Interval 01/18/2023 162    • QRSD Interval 01/18/2023 88    • QT Interval 01/18/2023 402    • QTC Interval 01/18/2023 475    • P Axis 01/18/2023 75    • QRS Axis 01/18/2023 68    • T Wave Axis 01/18/2023 55    • Ventricular Rate 01/18/2023 87    • Atrial Rate 01/18/2023 87    • CO Interval 01/18/2023 162    • QRSD Interval 01/18/2023 82    • QT Interval 01/18/2023 380    • QTC Interval 01/18/2023 457    • P Axis 01/18/2023 73    • QRS Axis 01/18/2023 75    • T Wave Axis 01/18/2023 47    • Procalcitonin 01/18/2023 0 10    • Platelets 23/71/9160 188    • MPV 01/18/2023 10 2    • Sodium 01/19/2023 141    • Potassium 01/19/2023 3 7    • Chloride 01/19/2023 100    • CO2 01/19/2023 38 (H)    • ANION GAP 01/19/2023 3 (L)    • BUN 01/19/2023 13    • Creatinine 01/19/2023 1 20    • Glucose 01/19/2023 104    • Calcium 01/19/2023 8 5    • eGFR 01/19/2023 57    • WBC 01/19/2023 6 55    • RBC 01/19/2023 4 50    • Hemoglobin 01/19/2023 14 6    • Hematocrit 01/19/2023 46 0    • MCV 01/19/2023 102 (H)    • MCH 01/19/2023 32 4    • MCHC 01/19/2023 31 7    • RDW 01/19/2023 14 6    • Platelets 33/85/0963 163    • MPV 01/19/2023 10 1    • Magnesium 01/19/2023 2 1    • Procalcitonin 01/18/2023 0 13    • Ventricular Rate 01/18/2023 93    • Atrial Rate 01/18/2023 93    • NM Interval 01/18/2023 160    • QRSD Interval 01/18/2023 92    • QT Interval 01/18/2023 376    • QTC Interval 01/18/2023 467    • P Axis 01/18/2023 77    • QRS Axis 01/18/2023 66    • T Wave [place] 01/18/2023 80    Admission on 01/09/2023, Discharged on 01/11/2023   Component Date Value   • Ventricular Rate 01/09/2023 65    • Atrial Rate 01/09/2023 65    • NM Interval 01/09/2023 176    • QRSD Interval 01/09/2023 100    • QT Interval 01/09/2023 452    • QTC Interval 01/09/2023 470    • P Axis 01/09/2023 80    • QRS Axis 01/09/2023 69    • T Wave Axis 01/09/2023 89    • WBC 01/09/2023 5 92    • RBC 01/09/2023 4 60    • Hemoglobin 01/09/2023 14 9    • Hematocrit 01/09/2023 45 9    • MCV 01/09/2023 100 (H)    • MCH 01/09/2023 32 4    • MCHC 01/09/2023 32 5    • RDW 01/09/2023 14 8    • MPV 01/09/2023 10 2    • Platelets 39/18/5287 186    • nRBC 01/09/2023 0    • Neutrophils Relative 01/09/2023 61    • Immat GRANS % 01/09/2023 0    • Lymphocytes Relative 01/09/2023 17    • Monocytes Relative 01/09/2023 15 (H)    • Eosinophils Relative 01/09/2023 6 • Basophils Relative 01/09/2023 1    • Neutrophils Absolute 01/09/2023 3 62    • Immature Grans Absolute 01/09/2023 0 02    • Lymphocytes Absolute 01/09/2023 1 02    • Monocytes Absolute 01/09/2023 0 86    • Eosinophils Absolute 01/09/2023 0 35    • Basophils Absolute 01/09/2023 0 05    • Sodium 01/09/2023 141    • Potassium 01/09/2023 2 9 (L)    • Chloride 01/09/2023 96    • CO2 01/09/2023 39 (H)    • ANION GAP 01/09/2023 6    • BUN 01/09/2023 16    • Creatinine 01/09/2023 1 18    • Glucose 01/09/2023 100    • Calcium 01/09/2023 8 7    • AST 01/09/2023 18    • ALT 01/09/2023 12    • Alkaline Phosphatase 01/09/2023 58    • Total Protein 01/09/2023 5 7 (L)    • Albumin 01/09/2023 3 7    • Total Bilirubin 01/09/2023 0 78    • eGFR 01/09/2023 58    • SARS-CoV-2 01/09/2023 Positive (A)    • INFLUENZA A PCR 01/09/2023 Negative    • INFLUENZA B PCR 01/09/2023 Negative    • RSV PCR 01/09/2023 Negative    • D-Dimer, Quant 01/09/2023 0 45    • hs TnI 0hr 01/09/2023 14    • BNP 01/09/2023 61    • hs TnI 2hr 01/09/2023 13    • Delta 2hr hsTnI 01/09/2023 -1    • Procalcitonin 01/09/2023 0 12    • Sodium 01/10/2023 139    • Potassium 01/10/2023 3 6    • Chloride 01/10/2023 100    • CO2 01/10/2023 31    • ANION GAP 01/10/2023 8    • BUN 01/10/2023 19    • Creatinine 01/10/2023 1 02    • Glucose 01/10/2023 187 (H)    • Calcium 01/10/2023 8 3 (L)    • AST 01/10/2023 17    • ALT 01/10/2023 11    • Alkaline Phosphatase 01/10/2023 55    • Total Protein 01/10/2023 5 5 (L)    • Albumin 01/10/2023 3 6    • Total Bilirubin 01/10/2023 0 71    • eGFR 01/10/2023 70    • WBC 01/10/2023 15 44 (H)    • RBC 01/10/2023 4 58    • Hemoglobin 01/10/2023 14 6    • Hematocrit 01/10/2023 46 1    • MCV 01/10/2023 101 (H)    • MCH 01/10/2023 31 9    • MCHC 01/10/2023 31 7    • RDW 01/10/2023 14 7    • MPV 01/10/2023 10 0    • Platelets 03/17/4193 206    • Protime 01/10/2023 14 0    • INR 01/10/2023 1 08    • Segmented % 01/10/2023 96 (H)    • Bands % 01/10/2023 1    • Lymphocytes % 01/10/2023 1 (L)    • Monocytes % 01/10/2023 2 (L)    • Eosinophils, % 01/10/2023 0    • Basophils % 01/10/2023 0    • Absolute Neutrophils 01/10/2023 14 98 (H)    • Lymphocytes Absolute 01/10/2023 0 15 (L)    • Monocytes Absolute 01/10/2023 0 31    • Eosinophils Absolute 01/10/2023 0 00    • Basophils Absolute 01/10/2023 0 00    • RBC Morphology 01/10/2023 Normal    • Platelet Estimate 59/84/9304 Adequate    • Large Platelet 93/79/8223 Present    • WBC 01/11/2023 14 25 (H)    • RBC 01/11/2023 4 46    • Hemoglobin 01/11/2023 14 4    • Hematocrit 01/11/2023 44 4    • MCV 01/11/2023 100 (H)    • MCH 01/11/2023 32 3    • MCHC 01/11/2023 32 4    • RDW 01/11/2023 14 8    • Platelets 32/93/2564 200    • MPV 01/11/2023 10 2    • Sodium 01/11/2023 139    • Potassium 01/11/2023 3 9    • Chloride 01/11/2023 101    • CO2 01/11/2023 32    • ANION GAP 01/11/2023 6    • BUN 01/11/2023 19    • Creatinine 01/11/2023 1 05    • Glucose 01/11/2023 151 (H)    • Calcium 01/11/2023 8 3 (L)    • AST 01/11/2023 18    • ALT 01/11/2023 12    • Alkaline Phosphatase 01/11/2023 54    • Total Protein 01/11/2023 5 3 (L)    • Albumin 01/11/2023 3 5    • Total Bilirubin 01/11/2023 0 48    • eGFR 01/11/2023 67    • Magnesium 01/11/2023 2 3    • CRP 01/11/2023 1 4         Imaging: CTA head and neck w wo contrast    Result Date: 2/15/2023  Narrative: CTA NECK AND BRAIN WITH AND WITHOUT CONTRAST INDICATION: I65 23: Occlusion and stenosis of bilateral carotid arteries   Absent left intra-cranial carotid flow noted on MRI, please evaluate; absent left intra-cranial carotid flow noted on MRI, please evaluate COMPARISON:   MRI brain without contrast January 30, 2023  MRI brain IAC with and without contrast September 1, 2021  TECHNIQUE:  Routine CT imaging of the Brain without contrast   Post contrast imaging was performed after administration of iodinated contrast through the neck and brain   Post contrast axial 0 625 mm images timed to opacify the arterial system  3D rendering was performed on an independent workstation  MIP reconstructions performed  Coronal reconstructions were performed of the noncontrast portion of the brain  Radiation dose length product (DLP) for this visit:  1432 mGy-cm   This examination, like all CT scans performed in the [facility], was performed utilizing techniques to minimize radiation dose exposure, including the use of iterative reconstruction and automated exposure control  IV Contrast:  80 mL of iohexol (OMNIPAQUE)  IMAGE QUALITY:   Diagnostic FINDINGS: NONCONTRAST BRAIN PARENCHYMA: Decreased attenuation is noted in periventricular and subcortical white matter demonstrating an appearance that is statistically most likely to represent mild microangiopathic change  No CT signs of acute infarction  No intracranial mass, mass effect or midline shift  No acute parenchymal hemorrhage  VENTRICLES AND EXTRA-AXIAL SPACES:  Normal for the patient's age  VISUALIZED ORBITS: Similar bilateral globe proptosis (right worse than left)  Sequela of bilateral cataract surgery  PARANASAL SINUSES: Mild mucosal thickening in left frontal, bilateral ethmoid, and bilateral maxillary sinuses  CERVICAL VASCULATURE AORTIC ARCH AND GREAT VESSELS:  Mild atherosclerotic disease of the arch, proximal great vessels and visualized subclavian vessels  No significant stenosis  RIGHT VERTEBRAL ARTERY CERVICAL SEGMENT:  Mild stenosis at the origin  The vessel is normal in caliber throughout the neck  Mild scattered calcified atherosclerotic disease in V2 segment without significant stenosis  LEFT VERTEBRAL ARTERY CERVICAL SEGMENT:  Mild stenosis at the origin  The vessel is normal in caliber throughout the neck  Mild scattered calcified atherosclerotic disease in V2 segment without significant stenosis   RIGHT EXTRACRANIAL CAROTID SEGMENT:  Mild calcified atherosclerotic disease of the distal common carotid artery and proximal cervical internal carotid artery without significant stenosis compared to the more distal ICA  Less than 50% stenosis  No dissection  LEFT EXTRACRANIAL CAROTID SEGMENT:  Severe calcified atherosclerotic disease abruptly tapering in the origin of left ICA proximal cervical segment  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  Markedly diminutive vessel in left ICA distal cervical segment extending to the skull base, may be receiving retrograde flow  Patent left external carotid artery and its branches  NASCET criteria was used to determine the degree of internal carotid artery diameter stenosis  INTRACRANIAL VASCULATURE INTERNAL CAROTID ARTERIES: Markedly diminutive left ICA proximal petrous segment (vertical and proximal horizontal) with asymmetrically smaller caliber of remainder of the left ICA intracranial segments, which may be receiving retrograde flow through patent anterior [place] of [place]  Normal enhancement of intracranial portions of right internal carotid carotid artery  Normal ophthalmic artery origins  Normal ICA terminus  ANTERIOR CIRCULATION:  Symmetric A1 segments  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 (6:71)  Anterior anterior cerebral arteries with normal enhancement  Normal anterior communicating artery  MIDDLE CEREBRAL ARTERY CIRCULATION:  M1 segment and middle cerebral artery branches demonstrate normal enhancement bilaterally  DISTAL VERTEBRAL ARTERIES:  Patent distal vertebral arteries with mild calcified atherosclerotic disease bilaterally  Mild stenosis in right vertebral artery V4 segment  Posterior inferior cerebellar artery origins are normal  Normal vertebral basilar junction  BASILAR ARTERY:  Basilar artery is normal in caliber  Normal superior cerebellar arteries  POSTERIOR CEREBRAL ARTERIES: Both posterior cerebral arteries arises from the basilar tip  Both arteries demonstrate normal enhancement    VENOUS STRUCTURES:  Normal  NON VASCULAR ANATOMY BONY STRUCTURES:  No acute osseous abnormality  Multilevel degenerative changes of cervical and visualized upper thoracic spine  Prominent anterior bridging osteophytes throughout the cervical and visualized upper thoracic spine, worse from C2 to C6, likely due to diffuse idiopathic skeletal hyperostosis (DISH)  SOFT TISSUES OF THE NECK: Multinodular thyroid gland with largest nodule in left thyroid lobe measuring beam partially calcified and measuring 1 4 cm (5:240)  Incidental discovery of one or more thyroid nodule(s) measuring less than 1 5 cm and without suspicious features is noted in this patient who is above 28years old; according to guidelines published in the February 2015 white paper on incidental thyroid nodules in the Journal of the [employer] of Radiology [facility]), no further evaluation is recommended  THORACIC INLET:  Emphysema  No focal consolidation in visualized upper lung zones  Impression: No acute intracranial abnormality  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  Markedly diminutive vessel in left ICA distal cervical segment extending to the skull base, may be receiving retrograde flow  Markedly diminutive left ICA proximal petrous segment (vertical and proximal horizontal) with asymmetrically smaller caliber of remainder of the left ICA intracranial segments, which may be receiving retrograde flow through patent anterior [place] of [place]  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  Recommend consultation with the [facility], a division of [address] [place] [address] Neuroscience at [phone]  Additional chronic/incidental findings as detailed above  The study was marked in EPIC for significant notification   Workstation performed: [id number]     XR chest portable    Result Date: 2/24/2023  Narrative: CHEST INDICATION:   s/p chest tube removal  COMPARISON:  Chest x-ray 2/23/2023 EXAM PERFORMED/VIEWS: XR CHEST PORTABLE FINDINGS:  Right chest tube has been removed  Subcutaneous emphysema at the right chest wall  Cardiomediastinal silhouette appears unremarkable  Bibasilar discoid atelectasis  No definite pneumothorax  No pleural effusion  Osseous structures appear within normal limits for patient age  Impression: Removal of the right chest tube  No definite pneumothorax  Workstation performed: [id number]     XR chest portable    Result Date: 2/23/2023  Narrative: CHEST INDICATION:   pneumothorax  COMPARISON:  Chest x-ray performed the previous day  EXAM PERFORMED/VIEWS:  XR CHEST PORTABLE FINDINGS:  Right chest tube is demonstrated  Cardiomediastinal silhouette appears unremarkable  Subcutaneous emphysema is seen in the right chest wall  There is no pneumothorax  Bilateral lower lobe opacity is larger at the right lung base are again seen  Osseous structures appear within normal limits for patient age  Impression: No pneumothorax  Workstation performed: [id number]     XR chest portable    Result Date: 2/21/2023  Narrative: CHEST INDICATION:   follow up pneumothorax  COMPARISON:  Chest x-ray 2/21/2023 0030 hours EXAM PERFORMED/VIEWS:  XR CHEST PORTABLE  2/21/2023 0642 hours FINDINGS:  Right chest tube tip overlies the mid to lower lung zone, stable  Cardiomediastinal silhouette appears unremarkable  No definite pneumothorax  Patchy bibasilar atelectasis  Emphysematous changes of the lung fields  Decreased subcutaneous emphysema at the right chest wall  Osseous structures appear within normal limits for patient age  Impression: Right chest tube tip overlies the mid to lower lung zone, stable  No definite pneumothorax  Patchy bibasilar atelectasis  Workstation performed: [id number]     XR chest portable    Result Date: 2/21/2023  Narrative: CHEST INDICATION:   increased work of breathing   COMPARISON:  Chest x-ray 2/20/2023 2235 hours and additional priors EXAM PERFORMED/VIEWS:  XR CHEST PORTABLE  2/20/2023 2347 hours FINDINGS:  Right chest tube tip overlies the right midlung  Cardiomediastinal silhouette appears unremarkable  Patchy bibasilar atelectasis  Trace right apical and lateral pneumothorax, not definitely seen on most recent prior exam but still improved from the initial chest x-rays  Emphysematous changes of the lung fields  Subcutaneous emphysema at the right chest wall  Osseous structures appear within normal limits for patient age  Impression: Right chest tube again noted  Trace right apical and lateral pneumothorax, not definitely seen on most recent prior exam but still improved from the initial chest x-rays  Patchy bibasilar atelectasis, stable  Workstation performed: [id number]     XR chest 1 view portable    Result Date: 2/21/2023  Narrative: CHEST INDICATION:   s/p CHEST TUBE INSERTION  COMPARISON:  2/20/2023 EXAM PERFORMED/VIEWS:  XR CHEST PORTABLE FINDINGS: Cardiomediastinal silhouette appears unremarkable  Right chest tube is noted  Interval resolution of right pneumothorax is noted  Minimal left lung base atelectasis is present  Osseous structures appear within normal limits for patient age  Impression: Interval placement of right chest tube with resolution of right pneumothorax  Workstation performed: [id number]     XR chest 1 view portable    Result Date: 2/21/2023  Narrative: CHEST INDICATION:   S/P intercoastal nerve block on R side today, SOB  COMPARISON:  Chest radiograph 1/18/2023  EXAM PERFORMED/VIEWS:  XR CHEST PORTABLE FINDINGS: Cardiomediastinal silhouette appears unremarkable  Small to moderate right pneumothorax  Bibasilar atelectasis  Osseous structures appear within normal limits for patient age  Impression: Small moderate right pneumothorax  No evidence of tension   Workstation performed: [id number]     CT chest wo contrast    Result Date: 2/23/2023  Narrative: CT CHEST WITHOUT IV CONTRAST INDICATION:   Pneumothorax evaluate lung parenchyma- has emphysema with pneumothorax  Now with significant subcutaenous air  COMPARISON:  Multiple prior chest radiographs  Chest CT 2/28/2022  TECHNIQUE: CT examination of the chest was performed without intravenous contrast  Axial, sagittal, and coronal 2D reformatted images were created from the source data and submitted for interpretation  Radiation dose length product (DLP) for this visit:  576 68 mGy-cm   This examination, like all CT scans performed in the [facility], was performed utilizing techniques to minimize radiation dose exposure, including the use of iterative  reconstruction and automated exposure control  FINDINGS: LUNGS:  Severe emphysema, worse in the upper lobes  There is a 5 mm left lower lobe solid nodule (series 3 image 140), new since 2/28/2022  Multiple bilateral punctate calcified granulomas  Bilateral lower lobe dense consolidation  There is no tracheal or endobronchial lesion  PLEURA:  Minimal residual right pneumothorax along the anterior long and medial lung superiorly with a right lateral chest tube in place  HEART/GREAT VESSELS: Atherosclerotic aortic and coronary artery calcification is noted  Heart is otherwise unremarkable  No thoracic aortic aneurysm  MEDIASTINUM AND ZION:  Unremarkable  CHEST WALL AND LOWER NECK: Significant right anterior and lateral chest wall subcutaneous emphysema  Incidental discovery of one or more thyroid nodule(s) measuring less than 1 5 cm and without suspicious features is noted in this patient who is above 28years old; according to guidelines published in the February 2015 white paper on incidental thyroid nodules in the Journal of the [employer] of Radiology [facility]azan Donnelly), no further evaluation is recommended  VISUALIZED STRUCTURES IN THE UPPER ABDOMEN:  Scattered hepatic subcentimeter hypodensities, too small to definitively characterize, but statistically likely benign    Bilateral simple renal cysts and subcentimeter hypodensities, too small to definitively characterize, but statistically likely benign  Diverticulosis  OSSEOUS STRUCTURES:  Spinal degenerative changes are noted  No acute fracture or destructive osseous lesion  Impression: 1  Minimal residual right pneumothorax with chest tube in place  Right anterior and lateral chest wall subcutaneous emphysema  2   Bilateral lower lobe dense consolidation, which may represent atelectasis or pneumonia  3   Left lower lobe solid nodule measuring 5 mm, new since 2/28/2022  Based on current Fleischner Society 2017 Guidelines on incidental pulmonary nodule, patients with a known malignancy are at increased risk of metastasis and should receive initial three month follow-up chest CT  The study was marked in [facility] for immediate notification  Workstation performed: [id number]     XR chest portable ICU    Result Date: 2/23/2023  Narrative: CHEST INDICATION:   eval PTX  COMPARISON:  Chest x-ray performed the previous day  EXAM PERFORMED/VIEWS:  XR CHEST PORTABLE ICU FINDINGS:  Right chest tube is seen  Cardiomediastinal silhouette appears unremarkable  No pneumothorax is visualized  Subcutaneous emphysema has slightly decreased  Patchy opacities in the lung bases are noted, slightly improved on the right  Bony structures are unchanged  There are surgical clips in the right neck  Impression: No pneumothorax  Patchy basilar opacities slightly improved  Workstation performed: [id number]     XR chest portable ICU    Result Date: 2/21/2023  Narrative: CHEST INDICATION:   s/p RCT repositioning  COMPARISON:  Chest x-ray 2/20/2023 EXAM PERFORMED/VIEWS:  XR CHEST PORTABLE ICU FINDINGS:  Stable positioning of the right chest tube at the mid to lower lung zone  Cardiomediastinal silhouette appears unremarkable  No definite pneumothorax  No pleural effusion  Patchy bibasilar atelectasis  Osseous structures appear within normal limits for patient age       Impression: Stable positioning of the right chest tube at the mid to lower lung zone  No definite pneumothorax  Workstation performed: [id number]       Review of Systems:  Review of Systems   Constitutional: Negative for chills, diaphoresis, fatigue and fever  HENT: Negative for trouble swallowing and voice change  Respiratory: Positive for shortness of breath (chronic baseline)  Cardiovascular: Negative for chest pain, palpitations and leg swelling  Gastrointestinal: Negative for abdominal pain, constipation, diarrhea, nausea and vomiting  Genitourinary: Negative for dysuria  Musculoskeletal: Positive for arthralgias  Skin: Negative for rash  Neurological: Negative for dizziness, syncope, light-headedness and headaches  Psychiatric/Behavioral: Negative for agitation and confusion  All other systems reviewed and are negative  Vitals:    03/08/23 1418   BP: 120/74   Pulse: 95   SpO2: 93%   Weight: 93 kg (205 lb)   Height: 5' 8" (1 727 m)     Vitals:    03/08/23 1418   Weight: 93 kg (205 lb)     Height: 5' 8" (172 7 cm)     Physical Exam:  Physical Exam  Vitals reviewed  Constitutional:       General: He is not in acute distress  Appearance: He is obese  He is not diaphoretic  HENT:      Head: Normocephalic and atraumatic  Comments: Nasal cannula oxygen in place   Eyes:      General:         Right eye: No discharge  Left eye: No discharge  Neck:      Comments: Trachea midline, no JVD present  Cardiovascular:      Rate and Rhythm: Normal rate and regular rhythm  Heart sounds: Murmur (BRENDEN) heard  No friction rub  Pulmonary:      Effort: No respiratory distress  Breath sounds: No wheezing  Chest:      Chest wall: Tenderness (over right rib area) present  Abdominal:      General: Bowel sounds are normal       Palpations: Abdomen is soft  Tenderness: There is no abdominal tenderness  There is no rebound  Musculoskeletal:      Right lower leg: No edema  Left lower leg: No edema  Skin:     General: Skin is warm and dry  Neurological:      Mental Status: He is alert  Comments: Awake, alert, able to answer questions appropriately, able to move extremities bilaterally     Psychiatric:         Mood and Affect: Mood normal          Behavior: Behavior normal  written material/verbal instruction/skill demonstration

## 2023-04-10 ENCOUNTER — APPOINTMENT (OUTPATIENT)
Dept: VASCULAR SURGERY | Facility: CLINIC | Age: 73
End: 2023-04-10
Payer: MEDICARE

## 2023-04-10 PROCEDURE — 93880 EXTRACRANIAL BILAT STUDY: CPT

## 2023-04-10 PROCEDURE — 99213 OFFICE O/P EST LOW 20 MIN: CPT

## 2023-04-10 NOTE — ASSESSMENT
[FreeTextEntry1] : 71 year old male with history of carotid artery stenosis status post right carotid endarterectomy \par \par In the office today, patient underwent a duplex study which demonstrated a  Widely patent right carotid and < 50% stenosis on the left.\par \par Continue aspirin and atorvastatin.  Follow-up in 1 year.

## 2023-04-10 NOTE — PHYSICAL EXAM
[Normal Breath Sounds] : Normal breath sounds [Normal Heart Sounds] : normal heart sounds [Right Carotid Bruit] : right carotid bruit heard [2+] : left 2+ [No Rash or Lesion] : No rash or lesion [Alert] : alert [Oriented to Person] : oriented to person [Oriented to Place] : oriented to place [Calm] : calm [JVD] : no jugular venous distention  [Ankle Swelling (On Exam)] : not present [Varicose Veins Of Lower Extremities] : not present [] : not present [Abdomen Masses] : No abdominal masses [Skin Ulcer] : no ulcer [de-identified] : appears well  [de-identified] : no facial asymmetry
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
## 2023-09-15 ENCOUNTER — APPOINTMENT (OUTPATIENT)
Dept: CT IMAGING | Facility: CLINIC | Age: 73
End: 2023-09-15
Payer: MEDICARE

## 2023-09-15 ENCOUNTER — OUTPATIENT (OUTPATIENT)
Dept: OUTPATIENT SERVICES | Facility: HOSPITAL | Age: 73
LOS: 1 days | End: 2023-09-15
Payer: MEDICARE

## 2023-09-15 DIAGNOSIS — Z98.890 OTHER SPECIFIED POSTPROCEDURAL STATES: Chronic | ICD-10-CM

## 2023-09-15 DIAGNOSIS — N43.40 SPERMATOCELE OF EPIDIDYMIS, UNSPECIFIED: Chronic | ICD-10-CM

## 2023-09-15 DIAGNOSIS — C34.90 MALIGNANT NEOPLASM OF UNSPECIFIED PART OF UNSPECIFIED BRONCHUS OR LUNG: ICD-10-CM

## 2023-09-15 PROCEDURE — 71250 CT THORAX DX C-: CPT

## 2023-09-15 PROCEDURE — 71250 CT THORAX DX C-: CPT | Mod: 26,MH

## 2023-09-19 NOTE — DISCHARGE NOTE NURSING/CASE MANAGEMENT/SOCIAL WORK - NSDCPEHOTLINE_GEN_ALL_CORE
9/19 2nd attempt.  LVM for patient to schedule a visit with Dr. Redmond on 11/13 at .       Please transfer the call to me at #[phone] when they call back.     Thank you,     Amanda De Leon  Pediatric Specialty   [facility] [place]    [facility] Smokers Quitline [phone] ([phone])

## 2023-09-20 ENCOUNTER — APPOINTMENT (OUTPATIENT)
Dept: THORACIC SURGERY | Facility: CLINIC | Age: 73
End: 2023-09-20
Payer: MEDICARE

## 2023-09-20 VITALS
HEART RATE: 95 BPM | SYSTOLIC BLOOD PRESSURE: 195 MMHG | DIASTOLIC BLOOD PRESSURE: 84 MMHG | WEIGHT: 195 LBS | OXYGEN SATURATION: 96 % | BODY MASS INDEX: 29.55 KG/M2 | HEIGHT: 68 IN

## 2023-09-20 PROCEDURE — 99214 OFFICE O/P EST MOD 30 MIN: CPT

## 2023-12-04 NOTE — H&P PST ADULT - NEGATIVE GASTROINTESTINAL SYMPTOMS
no diarrhea/no vomiting/no constipation/no nausea/no abdominal pain/no melena maximum assist (25% patients effort)

## 2023-12-21 ENCOUNTER — EMERGENCY (EMERGENCY)
Facility: HOSPITAL | Age: 73
LOS: 1 days | Discharge: HOME | End: 2023-12-21
Attending: EMERGENCY MEDICINE | Admitting: EMERGENCY MEDICINE
Payer: MEDICARE

## 2023-12-21 VITALS
TEMPERATURE: 98 F | HEART RATE: 78 BPM | OXYGEN SATURATION: 97 % | HEIGHT: 68 IN | SYSTOLIC BLOOD PRESSURE: 227 MMHG | DIASTOLIC BLOOD PRESSURE: 97 MMHG | WEIGHT: 199.74 LBS | RESPIRATION RATE: 18 BRPM

## 2023-12-21 VITALS
OXYGEN SATURATION: 99 % | RESPIRATION RATE: 12 BRPM | SYSTOLIC BLOOD PRESSURE: 177 MMHG | DIASTOLIC BLOOD PRESSURE: 79 MMHG | HEART RATE: 65 BPM | TEMPERATURE: 98 F

## 2023-12-21 DIAGNOSIS — Z98.890 OTHER SPECIFIED POSTPROCEDURAL STATES: Chronic | ICD-10-CM

## 2023-12-21 DIAGNOSIS — N43.40 SPERMATOCELE OF EPIDIDYMIS, UNSPECIFIED: Chronic | ICD-10-CM

## 2023-12-21 LAB
ALBUMIN SERPL ELPH-MCNC: 3.5 G/DL — SIGNIFICANT CHANGE UP (ref 3.3–5)
ALBUMIN SERPL ELPH-MCNC: 3.5 G/DL — SIGNIFICANT CHANGE UP (ref 3.3–5)
ALP SERPL-CCNC: 48 U/L — SIGNIFICANT CHANGE UP (ref 30–120)
ALP SERPL-CCNC: 48 U/L — SIGNIFICANT CHANGE UP (ref 30–120)
ALT FLD-CCNC: 53 U/L — SIGNIFICANT CHANGE UP (ref 10–60)
ALT FLD-CCNC: 53 U/L — SIGNIFICANT CHANGE UP (ref 10–60)
ANION GAP SERPL CALC-SCNC: 9 MMOL/L — SIGNIFICANT CHANGE UP (ref 5–17)
ANION GAP SERPL CALC-SCNC: 9 MMOL/L — SIGNIFICANT CHANGE UP (ref 5–17)
APTT BLD: 31.8 SEC — SIGNIFICANT CHANGE UP (ref 24.5–35.6)
APTT BLD: 31.8 SEC — SIGNIFICANT CHANGE UP (ref 24.5–35.6)
AST SERPL-CCNC: 26 U/L — SIGNIFICANT CHANGE UP (ref 10–40)
AST SERPL-CCNC: 26 U/L — SIGNIFICANT CHANGE UP (ref 10–40)
BASOPHILS # BLD AUTO: 0.08 K/UL — SIGNIFICANT CHANGE UP (ref 0–0.2)
BASOPHILS # BLD AUTO: 0.08 K/UL — SIGNIFICANT CHANGE UP (ref 0–0.2)
BASOPHILS NFR BLD AUTO: 0.9 % — SIGNIFICANT CHANGE UP (ref 0–2)
BASOPHILS NFR BLD AUTO: 0.9 % — SIGNIFICANT CHANGE UP (ref 0–2)
BILIRUB SERPL-MCNC: 0.2 MG/DL — SIGNIFICANT CHANGE UP (ref 0.2–1.2)
BILIRUB SERPL-MCNC: 0.2 MG/DL — SIGNIFICANT CHANGE UP (ref 0.2–1.2)
BUN SERPL-MCNC: 24 MG/DL — HIGH (ref 7–23)
BUN SERPL-MCNC: 24 MG/DL — HIGH (ref 7–23)
CALCIUM SERPL-MCNC: 9.9 MG/DL — SIGNIFICANT CHANGE UP (ref 8.4–10.5)
CALCIUM SERPL-MCNC: 9.9 MG/DL — SIGNIFICANT CHANGE UP (ref 8.4–10.5)
CHLORIDE SERPL-SCNC: 104 MMOL/L — SIGNIFICANT CHANGE UP (ref 96–108)
CHLORIDE SERPL-SCNC: 104 MMOL/L — SIGNIFICANT CHANGE UP (ref 96–108)
CO2 SERPL-SCNC: 29 MMOL/L — SIGNIFICANT CHANGE UP (ref 22–31)
CO2 SERPL-SCNC: 29 MMOL/L — SIGNIFICANT CHANGE UP (ref 22–31)
CREAT SERPL-MCNC: 1.08 MG/DL — SIGNIFICANT CHANGE UP (ref 0.5–1.3)
CREAT SERPL-MCNC: 1.08 MG/DL — SIGNIFICANT CHANGE UP (ref 0.5–1.3)
EGFR: 73 ML/MIN/1.73M2 — SIGNIFICANT CHANGE UP
EGFR: 73 ML/MIN/1.73M2 — SIGNIFICANT CHANGE UP
EOSINOPHIL # BLD AUTO: 0.58 K/UL — HIGH (ref 0–0.5)
EOSINOPHIL # BLD AUTO: 0.58 K/UL — HIGH (ref 0–0.5)
EOSINOPHIL NFR BLD AUTO: 6.8 % — HIGH (ref 0–6)
EOSINOPHIL NFR BLD AUTO: 6.8 % — HIGH (ref 0–6)
GLUCOSE SERPL-MCNC: 190 MG/DL — HIGH (ref 70–99)
GLUCOSE SERPL-MCNC: 190 MG/DL — HIGH (ref 70–99)
HCT VFR BLD CALC: 42.5 % — SIGNIFICANT CHANGE UP (ref 39–50)
HCT VFR BLD CALC: 42.5 % — SIGNIFICANT CHANGE UP (ref 39–50)
HGB BLD-MCNC: 13.2 G/DL — SIGNIFICANT CHANGE UP (ref 13–17)
HGB BLD-MCNC: 13.2 G/DL — SIGNIFICANT CHANGE UP (ref 13–17)
IMM GRANULOCYTES NFR BLD AUTO: 0.6 % — SIGNIFICANT CHANGE UP (ref 0–0.9)
IMM GRANULOCYTES NFR BLD AUTO: 0.6 % — SIGNIFICANT CHANGE UP (ref 0–0.9)
INR BLD: 0.96 RATIO — SIGNIFICANT CHANGE UP (ref 0.85–1.18)
INR BLD: 0.96 RATIO — SIGNIFICANT CHANGE UP (ref 0.85–1.18)
LYMPHOCYTES # BLD AUTO: 2.12 K/UL — SIGNIFICANT CHANGE UP (ref 1–3.3)
LYMPHOCYTES # BLD AUTO: 2.12 K/UL — SIGNIFICANT CHANGE UP (ref 1–3.3)
LYMPHOCYTES # BLD AUTO: 24.9 % — SIGNIFICANT CHANGE UP (ref 13–44)
LYMPHOCYTES # BLD AUTO: 24.9 % — SIGNIFICANT CHANGE UP (ref 13–44)
MCHC RBC-ENTMCNC: 26.3 PG — LOW (ref 27–34)
MCHC RBC-ENTMCNC: 26.3 PG — LOW (ref 27–34)
MCHC RBC-ENTMCNC: 31.1 GM/DL — LOW (ref 32–36)
MCHC RBC-ENTMCNC: 31.1 GM/DL — LOW (ref 32–36)
MCV RBC AUTO: 84.7 FL — SIGNIFICANT CHANGE UP (ref 80–100)
MCV RBC AUTO: 84.7 FL — SIGNIFICANT CHANGE UP (ref 80–100)
MONOCYTES # BLD AUTO: 0.85 K/UL — SIGNIFICANT CHANGE UP (ref 0–0.9)
MONOCYTES # BLD AUTO: 0.85 K/UL — SIGNIFICANT CHANGE UP (ref 0–0.9)
MONOCYTES NFR BLD AUTO: 10 % — SIGNIFICANT CHANGE UP (ref 2–14)
MONOCYTES NFR BLD AUTO: 10 % — SIGNIFICANT CHANGE UP (ref 2–14)
NEUTROPHILS # BLD AUTO: 4.82 K/UL — SIGNIFICANT CHANGE UP (ref 1.8–7.4)
NEUTROPHILS # BLD AUTO: 4.82 K/UL — SIGNIFICANT CHANGE UP (ref 1.8–7.4)
NEUTROPHILS NFR BLD AUTO: 56.8 % — SIGNIFICANT CHANGE UP (ref 43–77)
NEUTROPHILS NFR BLD AUTO: 56.8 % — SIGNIFICANT CHANGE UP (ref 43–77)
NRBC # BLD: 0 /100 WBCS — SIGNIFICANT CHANGE UP (ref 0–0)
NRBC # BLD: 0 /100 WBCS — SIGNIFICANT CHANGE UP (ref 0–0)
PLATELET # BLD AUTO: 297 K/UL — SIGNIFICANT CHANGE UP (ref 150–400)
PLATELET # BLD AUTO: 297 K/UL — SIGNIFICANT CHANGE UP (ref 150–400)
POTASSIUM SERPL-MCNC: 4.2 MMOL/L — SIGNIFICANT CHANGE UP (ref 3.5–5.3)
POTASSIUM SERPL-MCNC: 4.2 MMOL/L — SIGNIFICANT CHANGE UP (ref 3.5–5.3)
POTASSIUM SERPL-SCNC: 4.2 MMOL/L — SIGNIFICANT CHANGE UP (ref 3.5–5.3)
POTASSIUM SERPL-SCNC: 4.2 MMOL/L — SIGNIFICANT CHANGE UP (ref 3.5–5.3)
PROT SERPL-MCNC: 7.6 G/DL — SIGNIFICANT CHANGE UP (ref 6–8.3)
PROT SERPL-MCNC: 7.6 G/DL — SIGNIFICANT CHANGE UP (ref 6–8.3)
PROTHROM AB SERPL-ACNC: 10.5 SEC — SIGNIFICANT CHANGE UP (ref 9.5–13)
PROTHROM AB SERPL-ACNC: 10.5 SEC — SIGNIFICANT CHANGE UP (ref 9.5–13)
RBC # BLD: 5.02 M/UL — SIGNIFICANT CHANGE UP (ref 4.2–5.8)
RBC # BLD: 5.02 M/UL — SIGNIFICANT CHANGE UP (ref 4.2–5.8)
RBC # FLD: 13.7 % — SIGNIFICANT CHANGE UP (ref 10.3–14.5)
RBC # FLD: 13.7 % — SIGNIFICANT CHANGE UP (ref 10.3–14.5)
SODIUM SERPL-SCNC: 142 MMOL/L — SIGNIFICANT CHANGE UP (ref 135–145)
SODIUM SERPL-SCNC: 142 MMOL/L — SIGNIFICANT CHANGE UP (ref 135–145)
TROPONIN I, HIGH SENSITIVITY RESULT: 34.7 NG/L — SIGNIFICANT CHANGE UP
TROPONIN I, HIGH SENSITIVITY RESULT: 34.7 NG/L — SIGNIFICANT CHANGE UP
WBC # BLD: 8.5 K/UL — SIGNIFICANT CHANGE UP (ref 3.8–10.5)
WBC # BLD: 8.5 K/UL — SIGNIFICANT CHANGE UP (ref 3.8–10.5)
WBC # FLD AUTO: 8.5 K/UL — SIGNIFICANT CHANGE UP (ref 3.8–10.5)
WBC # FLD AUTO: 8.5 K/UL — SIGNIFICANT CHANGE UP (ref 3.8–10.5)

## 2023-12-21 PROCEDURE — 99285 EMERGENCY DEPT VISIT HI MDM: CPT | Mod: 25

## 2023-12-21 PROCEDURE — 36415 COLL VENOUS BLD VENIPUNCTURE: CPT

## 2023-12-21 PROCEDURE — 96375 TX/PRO/DX INJ NEW DRUG ADDON: CPT

## 2023-12-21 PROCEDURE — 82962 GLUCOSE BLOOD TEST: CPT

## 2023-12-21 PROCEDURE — 85730 THROMBOPLASTIN TIME PARTIAL: CPT

## 2023-12-21 PROCEDURE — 85025 COMPLETE CBC W/AUTO DIFF WBC: CPT

## 2023-12-21 PROCEDURE — 71045 X-RAY EXAM CHEST 1 VIEW: CPT

## 2023-12-21 PROCEDURE — 93005 ELECTROCARDIOGRAM TRACING: CPT

## 2023-12-21 PROCEDURE — 99285 EMERGENCY DEPT VISIT HI MDM: CPT | Mod: FS

## 2023-12-21 PROCEDURE — 96374 THER/PROPH/DIAG INJ IV PUSH: CPT

## 2023-12-21 PROCEDURE — 85610 PROTHROMBIN TIME: CPT

## 2023-12-21 PROCEDURE — 84484 ASSAY OF TROPONIN QUANT: CPT

## 2023-12-21 PROCEDURE — 93010 ELECTROCARDIOGRAM REPORT: CPT

## 2023-12-21 PROCEDURE — 71045 X-RAY EXAM CHEST 1 VIEW: CPT | Mod: 26

## 2023-12-21 PROCEDURE — 80053 COMPREHEN METABOLIC PANEL: CPT

## 2023-12-21 RX ORDER — ACETAMINOPHEN 500 MG
650 TABLET ORAL ONCE
Refills: 0 | Status: COMPLETED | OUTPATIENT
Start: 2023-12-21 | End: 2023-12-21

## 2023-12-21 RX ORDER — LABETALOL HCL 100 MG
10 TABLET ORAL ONCE
Refills: 0 | Status: COMPLETED | OUTPATIENT
Start: 2023-12-21 | End: 2023-12-21

## 2023-12-21 RX ORDER — METOCLOPRAMIDE HCL 10 MG
10 TABLET ORAL ONCE
Refills: 0 | Status: COMPLETED | OUTPATIENT
Start: 2023-12-21 | End: 2023-12-21

## 2023-12-21 RX ADMIN — Medication 10 MILLIGRAM(S): at 21:30

## 2023-12-21 RX ADMIN — Medication 10 MILLIGRAM(S): at 21:31

## 2023-12-21 RX ADMIN — Medication 650 MILLIGRAM(S): at 22:34

## 2023-12-21 NOTE — ED PROVIDER NOTE - CARE PROVIDERS DIRECT ADDRESSES
[email or web address]

## 2023-12-21 NOTE — ED PROVIDER NOTE - PHYSICAL EXAMINATION
Constitutional: Awake, Alert, non-toxic. NAD  HEAD: Normocephalic, atraumatic.   EYES: PERRL, EOM intact, conjunctiva and sclera are clear bilaterally. No raccoon eyes.   ENT: No rhinorrhea, normal pharynx, patent, no tonsillar exudate or enlargement, mucous membranes pink/moist, no erythema, no drooling or stridor.   NECK: Supple, non-tender  CARDIOVASCULAR: Normal S1, S2; regular rate and rhythm.  RESPIRATORY: Normal respiratory effort; breath sounds CTAB, no wheezes, rhonchi, or rales. Speaking in full sentences. No accessory muscle use.   ABDOMEN: Soft; non-tender, non-distended  EXTREMITIES: Full passive and active ROM in all extremities; non-tender to palpation; distal pulses palpable and symmetric, no edema, no crepitus or step off  SKIN: Warm, dry; good skin turgor, no apparent lesions or rashes, no ecchymosis, brisk capillary refill.  NEURO: A&O x3. Sensory and motor functions are grossly intact. Speech is normal. Appearance and judgement seem appropriate for gender and age. No neurological deficits. Neurovascular sensation intact motor function 5/5 of upper and lower extremities, CN II-XII grossly intact, no ataxia, absent pronator drift, intact cerebellar function. Speech clear, without articulation or word-finding difficulties. Eyes- PERRL bilaterally. EOMs in tact. No nystagmus. No facial droop.

## 2023-12-21 NOTE — ED PROVIDER NOTE - CARE PROVIDER_API CALL
Arnoldo Yoo  Internal Medicine  [address] [place] [address], [address]  [place] [address]  Phone: [phone]  Fax: [phone]  Follow Up Time: 1-3 Days   Arnoldo Yoo  Internal Medicine  [address] [place] [address], [address]  [place] [address]  Phone: [phone]  Fax: [phone]  Follow Up Time: 1-3 Days

## 2023-12-21 NOTE — ED ADULT NURSE NOTE - NSFALLUNIVINTERV_ED_ALL_ED
Bed/Stretcher in lowest position, wheels locked, appropriate side rails in place/Call bell, personal items and telephone in reach/Instruct patient to call for assistance before getting out of bed/chair/stretcher/Non-slip footwear applied when patient is off stretcher/[place] to call system/Physically safe environment - no spills, clutter or unnecessary equipment/Purposeful proactive rounding/Room/bathroom lighting operational, light cord in reach Bed/Stretcher in lowest position, wheels locked, appropriate side rails in place/Call bell, personal items and telephone in reach/Instruct patient to call for assistance before getting out of bed/chair/stretcher/Non-slip footwear applied when patient is off stretcher/[place] to call system/Physically safe environment - no spills, clutter or unnecessary equipment/Purposeful proactive rounding/Room/bathroom lighting operational, light cord in reach

## 2023-12-21 NOTE — ED ADULT NURSE NOTE - NURSING MUSC ROM
Encounter Date: 2019       History     Chief Complaint   Patient presents with    Abdominal Pain     pt here for RUQ abd pain. pain for a few months; worse today after eating. reports nausea; denies V/D.      39 y.o. female Past Medical History:  No date: GERD (gastroesophageal reflux disease)     Presents for evaluation of abd pain, pt notes that the pain comes on/off, started suprapubic today and moved towards RLQ, did have some ruq discomfort as well.  Denies f/c, n/v, diarrhea/dysuria.        Review of patient's allergies indicates:  No Known Allergies  Past Medical History:   Diagnosis Date    GERD (gastroesophageal reflux disease)      Past Surgical History:   Procedure Laterality Date     SECTION, LOW TRANSVERSE       Family History   Problem Relation Age of Onset    No Known Problems Mother     No Known Problems Father      Social History     Tobacco Use    Smoking status: Never Smoker    Smokeless tobacco: Never Used   Substance Use Topics    Alcohol use: Yes    Drug use: No     Review of Systems   Constitutional: Negative for fever.   HENT: Negative for sore throat.    Respiratory: Negative for shortness of breath.    Cardiovascular: Negative for chest pain.   Gastrointestinal: Negative for nausea.   Genitourinary: Negative for dysuria.   Musculoskeletal: Negative for back pain.   Skin: Negative for rash.   Neurological: Negative for weakness.   Hematological: Does not bruise/bleed easily.   All other systems reviewed and are negative.      Physical Exam     Initial Vitals [19 1801]   BP Pulse Resp Temp SpO2   110/64 96 18 98.7 °F (37.1 °C) 100 %      MAP       --         Physical Exam    Nursing note and vitals reviewed.  Constitutional: She appears well-developed and well-nourished.   HENT:   Head: Normocephalic and atraumatic.   Eyes: Conjunctivae and EOM are normal. Pupils are equal, round, and reactive to light.   Neck: Normal range of motion.   Cardiovascular: Normal rate  and regular rhythm.   Pulmonary/Chest: Breath sounds normal. No respiratory distress. She has no wheezes. She has no rales.   Abdominal: She exhibits no distension.   Musculoskeletal: Normal range of motion.   Neurological: She is alert. No cranial nerve deficit. GCS score is 15. GCS eye subscore is 4. GCS verbal subscore is 5. GCS motor subscore is 6.   Skin: Skin is warm and dry.   Psychiatric: She has a normal mood and affect. Thought content normal.     minimal suprapubic ttp  +rlq ttp at [facility]    ED Course   Procedures  Labs Reviewed   URINALYSIS, REFLEX TO URINE CULTURE   CBC W/ AUTO DIFFERENTIAL   COMPREHENSIVE METABOLIC PANEL   LIPASE   HEPATITIS PANEL, ACUTE   POCT URINE PREGNANCY          Imaging Results    None                          will do screening labs, ua, us     Clinical Impression:       ICD-10-CM ICD-9-CM   1. Abdominal pain, unspecified abdominal location R10.9 789.00   2. Constipation, unspecified constipation type K59.00 564.00                                Vargas Morales MD  08/22/19 1449     full range of motion in all extremities

## 2023-12-21 NOTE — ED ADULT TRIAGE NOTE - CHIEF COMPLAINT QUOTE
I took my BP which is what I usually do and it was pretty high 215/113; I get headache side effects so my MD told me to switch back to the old mediation that I was taking; I took the other BP med tonight because my BP was high. I took Ramipril 20mg tonight and hydrochlorothiazide 25mg (which I usually only take in the am)

## 2023-12-21 NOTE — ED PROVIDER NOTE - ATTENDING APP SHARED VISIT CONTRIBUTION OF CARE
Guille Gupta MD: I have personally performed a face to face diagnostic evaluation on this patient.  I have reviewed the PA note and agree with the history, exam, and plan of care, except as noted.  History and Exam by me shows same findings as documented

## 2023-12-21 NOTE — ED PROVIDER NOTE - OBJECTIVE STATEMENT
73-year-old male with past medical history of hypertension, diabetes, hyperlipidemia presents today due to an elevated blood pressure.  Patient reports that he recently had a change in his medications.  Patient admits to having a headache. Patient reports not having chest pain but a discomfort to his chest.  Patient denies fever, cough, leg swelling, visual changes, slurring speech, diaphoresis, nausea, or any other complaints.

## 2023-12-21 NOTE — ED ADULT NURSE NOTE - OBJECTIVE STATEMENT
73-year-old male with past medical history of hypertension, diabetes, hyperlipidemia presents today due to an elevated blood pressure.  Patient reports that he recently had a change in his medications.  Patient admits to having a headache. Patient reports not having chest pain but a discomfort to his chest.  Patient denies fever, cough, leg swelling, visual changes, slurring speech, diaphoresis, nausea, or any other complaints

## 2023-12-21 NOTE — ED ADULT NURSE NOTE - BIRTH SEX
PT DAILY TREATMENT NOTE     Patient Name: Cassy Bloom  Date:10/4/2021  : 1957  [x]  Patient  Verified  Payor: Yessenia Sinha / [place] / Product Type: Managed Care Medicaid /    In time:900  Out time:932  Total Treatment Time (min): 32  Visit #: 7 of 12    Treatment Area: Low back pain [M54.5]  Cervicalgia [M54.2]    SUBJECTIVE  Pain Level (0-10 scale): 0/10  Any medication changes, allergies to medications, adverse drug reactions, diagnosis change, or new procedure performed?: [x] No    [] Yes (see summary sheet for update)  Subjective functional status/changes:   [] No changes reported  Pt stated that she is doing great and wants to be done next visit    OBJECTIVE    12 min Therapeutic Exercise:  [x] See flow sheet :   Rationale: increase ROM and increase strength to improve the patients ability to increase ease with ADLs    10 min Therapeutic Activity:  [x]  See flow sheet :   Rationale: increase strength, improve coordination and increase proprioception  to improve the patients ability to increase ease with functional tasks     10 min Neuromuscular Re-education:  [x]  See flow sheet :   Rationale: increase strength, improve coordination, improve balance and increase proprioception  to improve the patients ability to increase ease with functional activities    With   [x] TE   [] TA   [] neuro   [] other: Patient Education: [x] Review HEP    [] Progressed/Changed HEP based on:   [] positioning   [] body mechanics   [] transfers   [] heat/ice application    [] other:      Other Objective/Functional Measures:   Increased weight with rows and ext to 7#  No difficulty with exercises  No complaint of pain during session   Declined MH     Pain Level (0-10 scale) post treatment: 0/10    ASSESSMENT/Changes in Function:   Pt is progressing well toward goals. Pt cont to report improvement in overall sx's since eval. Right cervical rotation has greatly improved.  Pt no longer has difficulty with lifting 15# from the floor. Pt to be discharged with updated HEP next visit    Patient will continue to benefit from skilled PT services to modify and progress therapeutic interventions, address functional mobility deficits, address ROM deficits, address strength deficits, analyze and cue movement patterns, analyze and modify body mechanics/ergonomics, assess and modify postural abnormalities, address imbalance/dizziness and instruct in home and community integration to attain remaining goals. [x]  See Plan of Care  []  See progress note/recertification  []  See Discharge Summary         Progress towards goals / Updated goals:  Short Term Goals: To be accomplished in 1 weeks:  1.  Pt will be compliant with initial HEP in order to optimize therapy outcomes. Goal met. 9/30/21  Long Term Goals: To be accomplished in 4 weeks:  1.  Pt will improve FOTO by 17 points in order to demonstrate functional improvement. 2.  Pt will report 50% improvement since start of care in order to improve QOL. Goal met. Pt reports 80% improvement 9/30/21  3.  Pt will improve right cervical rotation AROM to > 60* in order to improve ease of checking blind spots with driving. Goal met. 70* 9/30/21    4.  Pt will perform 15# box lift floor<>waist with correct ergonomics without pain increase in order to demonstrate improved safety/ability with lifting with job tasks. Goal met.  9/30/21   5.  Pt will report a little difficulty with performing usual work/housework in order to demonstrate improved ability with job tasks and household management.     PLAN  []  Upgrade activities as tolerated     [x]  Continue plan of care  []  Update interventions per flow sheet       []  Discharge due to:_  []  Other:_      Wyatt Balderrama PTA 10/4/2021  6:37 AM    Future Appointments   Date Time Provider [place]   10/4/2021  9:00 AM Mojgan Abdullahi PTA [facility]   10/6/2021  9:00 AM Mojgan Abdullahi PTA [facility] 10/11/2021  9:00 AM Renetta Umana, PTA [facility]   10/13/2021  9:00 AM Renetta Umana, PTA [facility]   10/18/2021  9:00 AM Renetta Umana, PTA [facility]   10/21/2021  8:15 AM Gavino Villalobos, PT [facility]   10/25/2021  9:00 AM Renetta Umana, PTA [facility]   10/27/2021  9:00 AM Renetta Umana, PTA [facility] Male

## 2023-12-21 NOTE — ED PROVIDER NOTE - CLINICAL SUMMARY MEDICAL DECISION MAKING FREE TEXT BOX
Patient presents to emergency department with complaints of elevated blood pressure.  Patient has a history of high blood pressure.  Complains of a headache which is typical when his blood pressure is elevated patient also having some minimal chest discomfort at this time.  Exam unremarkable other than elevated blood pressure.  Will check labs.  EKG done in emergency department showing lateral T wave inversions.  Lateral T wave inversions were documented on an EKG from 3 years ago.  Patient states that he always has them.

## 2023-12-22 ENCOUNTER — NON-APPOINTMENT (OUTPATIENT)
Age: 73
End: 2023-12-22

## 2023-12-22 ENCOUNTER — APPOINTMENT (OUTPATIENT)
Dept: CARDIOLOGY | Facility: CLINIC | Age: 73
End: 2023-12-22
Payer: MEDICARE

## 2023-12-22 VITALS
WEIGHT: 195 LBS | OXYGEN SATURATION: 97 % | BODY MASS INDEX: 29.55 KG/M2 | HEIGHT: 68 IN | HEART RATE: 103 BPM | SYSTOLIC BLOOD PRESSURE: 185 MMHG | DIASTOLIC BLOOD PRESSURE: 101 MMHG

## 2023-12-22 VITALS — DIASTOLIC BLOOD PRESSURE: 80 MMHG | SYSTOLIC BLOOD PRESSURE: 180 MMHG

## 2023-12-22 PROCEDURE — 99205 OFFICE O/P NEW HI 60 MIN: CPT

## 2023-12-22 PROCEDURE — 93000 ELECTROCARDIOGRAM COMPLETE: CPT

## 2023-12-22 RX ORDER — RAMIPRIL 10 MG/1
10 CAPSULE ORAL
Qty: 180 | Refills: 3 | Status: ACTIVE | COMMUNITY
Start: 2020-01-13

## 2023-12-22 RX ORDER — AMLODIPINE BESYLATE 5 MG/1
5 TABLET ORAL
Qty: 90 | Refills: 3 | Status: ACTIVE | COMMUNITY
Start: 2023-12-22 | End: 1900-01-01

## 2023-12-22 NOTE — HISTORY OF PRESENT ILLNESS
[FreeTextEntry1] : Mr Jackson is a 73-year-old white male who has a history of a probable TIA, DM, HTN, Lung adenocarcinoma-s/p VATS-LLL lobectomy 2020 (no chemo/no radiation) Carotid atherosclerosis s/p RT CEA 2020,Carotid Doppler showed significant disease. An MRA scan showed a 90% plaque in the right coronary artery with mild plaque on the left side.  He stopped smoking approximately 10 years ago. he has hyperlipidemia. He has a family history of heart disease with his mother having a myocardial infarction at 70.   He was a former patient of Dr Lopez and was last seen in the office in 2020.  He arrives today for post hospitalization followup. He presented to [facility] yesterday 12/21/23 with elevated B/P. B/P on arrival to the ED was 227/97.  Hospital B/W was unrevealing, negative troponin. CXR without an acute process.  ECG with non specific TWI, which is unchanged from his prior ECGs.  He reports 2 days prior to the ED, he developed cold symptoms and was taking mucinex-DM along with aleve twice a day for headache.  He feels after takine the mucinex, his blood pressure became a problem. Prior to his cold symptoms, he was feeling well.  He reports overall good state of health. He is retired so he has not been engaged in regular regimented exercise.  He has a home in [place] and usually goes back and forth.  He denies chest pains or pressure. He  denies dizzy spells, feeling faint or fainting, He   denies palpitations or difficulty breathing while laying flat. He denies lower extremity swelling.

## 2023-12-22 NOTE — REASON FOR VISIT
[Symptom and Test Evaluation] : symptom and test evaluation [Hypertension] : hypertension [Other: ____] : [unfilled] [Initial Evaluation] : an initial evaluation of [Abnormal ECG] : an abnormal ECG [Carotid Artery Stenosis] : carotid stenosis [Hyperlipidemia] : hyperlipidemia [FreeTextEntry1] : Diabetes, TIA

## 2023-12-22 NOTE — PHYSICAL EXAM
[General Appearance - Well Developed] : well developed [Normal Appearance] : normal appearance [Well Groomed] : well groomed [General Appearance - Well Nourished] : well nourished [No Deformities] : no deformities [General Appearance - In No Acute Distress] : no acute distress [Normal Oral Mucosa] : normal oral mucosa [Normal Jugular Venous A Waves Present] : normal jugular venous A waves present [Normal Jugular Venous V Waves Present] : normal jugular venous V waves present [No Jugular Venous Anthony A Waves] : no jugular venous anthony A waves [Normal Rate] : normal [2+] : left 2+ [1+] : left 1+ [No Abnormalities] : the abdominal aorta was not enlarged and no bruit was heard [Respiration, Rhythm And Depth] : normal respiratory rhythm and effort [Exaggerated Use Of Accessory Muscles For Inspiration] : no accessory muscle use [Auscultation Breath Sounds / Voice Sounds] : lungs were clear to auscultation bilaterally [Bowel Sounds] : normal bowel sounds [Abdomen Soft] : soft [Abdomen Tenderness] : non-tender [Abnormal Walk] : normal gait [Gait - Sufficient For Exercise Testing] : the gait was sufficient for exercise testing [Nail Clubbing] : no clubbing of the fingernails [Cyanosis, Localized] : no localized cyanosis [Skin Color & Pigmentation] : normal skin color and pigmentation [Skin Turgor] : normal skin turgor [] : no rash [Oriented To Time, Place, And Person] : oriented to person, place, and time [Impaired Insight] : insight and judgment were intact [No Anxiety] : not feeling anxious [S3] : no S3 [S4] : no S4 [Right Femoral Bruit] : no bruit heard over the right femoral artery [Left Femoral Bruit] : no bruit heard over the left femoral artery [Well Developed] : well developed [Well Nourished] : well nourished [No Acute Distress] : no acute distress [Normal Conjunctiva] : normal conjunctiva [Normal Venous Pressure] : normal venous pressure [No Carotid Bruit] : no carotid bruit [Normal S1, S2] : normal S1, S2 [No Rub] : no rub [No Gallop] : no gallop [Rhythm Regular] : regular [Normal S1] : normal S1 [Normal S2] : normal S2 [No Murmur] : no murmurs heard [No Pitting Edema] : no pitting edema present [Right Carotid Bruit] : no bruit heard over the right carotid [Left Carotid Bruit] : no bruit heard over the left carotid [Clear Lung Fields] : clear lung fields [Good Air Entry] : good air entry [No Respiratory Distress] : no respiratory distress  [Soft] : abdomen soft [Non Tender] : non-tender [No Masses/organomegaly] : no masses/organomegaly [Normal Bowel Sounds] : normal bowel sounds [Normal Gait] : normal gait [No Edema] : no edema [No Cyanosis] : no cyanosis [No Clubbing] : no clubbing [No Varicosities] : no varicosities [No Rash] : no rash [No Skin Lesions] : no skin lesions [Moves all extremities] : moves all extremities [No Focal Deficits] : no focal deficits [Normal Speech] : normal speech [Alert and Oriented] : alert and oriented [Normal memory] : normal memory

## 2023-12-22 NOTE — CARDIOLOGY SUMMARY
[___] : [unfilled] [de-identified] : sinus tachycardia  [de-identified] : 2020 excercaquiles, (mets [de-identified] : 2019 62% NML LV/RV

## 2023-12-22 NOTE — DISCUSSION/SUMMARY
[FreeTextEntry1] : This is a 73-y/o male who presents for post hospitalization followup.  Prior visit history as noted above. He presented to [facility] with elevated B/P readings and HA.  I have reviewed all of the medical records available to me at this time from his admission at length, including consultations, laboratory records, radiologic records, medication administration records and discharge summary. He arrives in no acute distress.  He is euvolemic on exam. He does not present with symptoms c/w angina, HF or unstable arrhythmia.   His blood pressure however is still elevated despite resting a while in the office. It is likely his blood pressure is elevated more recently in the setting of cough and cold OTC medication and repeated anti-inflammatory use. First, I have advise he discontinue all OTC cold regimen. We will add amlodipine 5mg daily to his regimen.  (of note, losartan gave him headache in the past).  He will continue ramipril 20mg daily and HCTZ 25mg daily for now.  He will follow-up with his cardiologist in [place] in 2 weeks that has been pre scheduled.  He will continue atorvastatin for lipid management, goal LDL <70 and ASA for secondary prevention.  We will repeat and echocardiogram for surveillance on his return from [place]. NO need for ischemic work up at this time.    He will Followup in march on his return. [EKG obtained to assist in diagnosis and management of assessed problem(s)] : EKG obtained to assist in diagnosis and management of assessed problem(s)

## 2023-12-27 ENCOUNTER — APPOINTMENT (OUTPATIENT)
Dept: INTERNAL MEDICINE | Facility: CLINIC | Age: 73
End: 2023-12-27
Payer: MEDICARE

## 2023-12-27 VITALS
DIASTOLIC BLOOD PRESSURE: 82 MMHG | BODY MASS INDEX: 32.14 KG/M2 | WEIGHT: 200 LBS | SYSTOLIC BLOOD PRESSURE: 160 MMHG | HEART RATE: 88 BPM | TEMPERATURE: 96.2 F | HEIGHT: 66 IN | RESPIRATION RATE: 16 BRPM | OXYGEN SATURATION: 99 %

## 2023-12-27 DIAGNOSIS — R03.0 ELEVATED BLOOD-PRESSURE READING, W/OUT DIAGNOSIS OF HYPERTENSION: ICD-10-CM

## 2023-12-27 PROCEDURE — 99214 OFFICE O/P EST MOD 30 MIN: CPT

## 2023-12-27 RX ORDER — LANCETS 28 GAUGE
EACH MISCELLANEOUS
Qty: 100 | Refills: 0 | Status: ACTIVE | COMMUNITY
Start: 2023-11-15

## 2023-12-27 RX ORDER — HYDROCHLOROTHIAZIDE 25 MG/1
25 TABLET ORAL DAILY
Refills: 0 | Status: ACTIVE | COMMUNITY

## 2023-12-27 RX ORDER — PANTOPRAZOLE 40 MG/1
40 TABLET, DELAYED RELEASE ORAL
Qty: 90 | Refills: 0 | Status: ACTIVE | COMMUNITY
Start: 2023-11-13

## 2023-12-27 NOTE — REVIEW OF SYSTEMS
[Paroxysmal Nocturnal Dyspnea] : paroxysmal nocturnal dyspnea [Fever] : no fever [Chills] : no chills [Night Sweats] : no night sweats [Recent Change In Weight] : ~T no recent weight change [Discharge] : no discharge [Earache] : no earache [Hearing Loss] : no hearing loss [Nasal Discharge] : no nasal discharge [Chest Pain] : no chest pain [Orthopena] : no orthopnea [Shortness Of Breath] : no shortness of breath [Wheezing] : no wheezing [Cough] : no cough [Dyspnea on Exertion] : not dyspnea on exertion [Dysuria] : no dysuria [Hesitancy] : no hesitancy [Nocturia] : no nocturia [Hematuria] : no hematuria [Frequency] : no frequency [Joint Pain] : no joint pain [Joint Stiffness] : no joint stiffness [Back Pain] : no back pain [Joint Swelling] : no joint swelling [Itching] : no itching [Skin Rash] : no skin rash [Headache] : no headache [Dizziness] : no dizziness [Unsteady Walk] : no ataxia [Insomnia] : no insomnia [Anxiety] : no anxiety [Easy Bleeding] : no easy bleeding [Swollen Glands] : no swollen glands [Negative] : Respiratory [FreeTextEntry6] : mucus

## 2023-12-27 NOTE — HISTORY OF PRESENT ILLNESS
[FreeTextEntry1] : Blood pressure check  [de-identified] : Mr. MASON is a 73 year- male, with a past medical history as noted below, who present to the office today for blood pressure check. Patient states his blood pressure has been elevated.  Recently took Mucinex DM for for cough and blood pressure shot up..   Ended up going to the emergency room had a chest x-ray and blood work done which was normal.  Advised to follow-up with cardiology as well as primary care provider.  Denies any change in his medication at that time.  Did follow-up with cardiology who started on Norvasc 5 mg daily. Patient states she sees vascular once a year to monitor the carotid stenosis. Patient states he mostly lives in [place] has physicians in [place] for PCP and cardiology.  Comes up to [place] once or twice a year. Patient states he sees vascular once a year for the carotid sonogram.

## 2023-12-27 NOTE — COUNSELING
[Fall prevention counseling provided] : Fall prevention counseling provided [AUDIT-C Screening administered and reviewed] : AUDIT-C Screening administered and reviewed [Potential consequences of obesity discussed] : Potential consequences of obesity discussed [Encouraged to increase physical activity] : Encouraged to increase physical activity [Decrease Portions] : decrease portions [Good understanding] : Patient has a good understanding of disease, goals and obesity follow-up plan [FreeTextEntry2] : ADA diet, low fat and low sodium  [de-identified] : Low na diet \par  Low fat diet

## 2023-12-27 NOTE — HEALTH RISK ASSESSMENT
[Very Good] : ~his/her~  mood as very good [Yes] : Yes [Monthly or less (1 pt)] : Monthly or less (1 point) [1 or 2 (0 pts)] : 1 or 2 (0 points) [Never (0 pts)] : Never (0 points) [No] : In the past 12 months have you used drugs other than those required for medical reasons? No [No falls in past year] : Patient reported no falls in the past year [0] : 2) Feeling down, depressed, or hopeless: Not at all (0) [Patient/Caregiver unclear of wishes] : , patient/caregiver unclear of wishes [Patient reported colonoscopy was abnormal] : Patient reported colonoscopy was abnormal [Audit-CScore] : 1 [de-identified] : ADA diet  [[id number]] : 0 [AdvancecareDate] : 10/21 [Former] : Former [Alone] : lives alone [# of Members in Household ___] :  household currently consist of [unfilled] member(s) [] :  [Feels Safe at Home] : Feels safe at home [Fully functional (bathing, dressing, toileting, transferring, walking, feeding)] : Fully functional (bathing, dressing, toileting, transferring, walking, feeding) [Fully functional (using the telephone, shopping, preparing meals, housekeeping, doing laundry, using] : Fully functional and needs no help or supervision to perform IADLs (using the telephone, shopping, preparing meals, housekeeping, doing laundry, using transportation, managing medications and managing finances) [ColonoscopyDate] : 12/19 [ColonoscopyComments] : colon polyp  repeat ?

## 2023-12-27 NOTE — PHYSICAL EXAM
[No Acute Distress] : no acute distress [Well Nourished] : well nourished [Well Developed] : well developed [Well-Appearing] : well-appearing [Normal Sclera/Conjunctiva] : normal sclera/conjunctiva [PERRL] : pupils equal round and reactive to light [EOMI] : extraocular movements intact [Normal Outer Ear/Nose] : the outer ears and nose were normal in appearance [Normal Oropharynx] : the oropharynx was normal [Normal TMs] : both tympanic membranes were normal [No JVD] : no jugular venous distention [No Lymphadenopathy] : no lymphadenopathy [Supple] : supple [Thyroid Normal, No Nodules] : the thyroid was normal and there were no nodules present [No Respiratory Distress] : no respiratory distress  [No Accessory Muscle Use] : no accessory muscle use [Clear to Auscultation] : lungs were clear to auscultation bilaterally [Normal Rate] : normal rate  [Regular Rhythm] : with a regular rhythm [Normal S1, S2] : normal S1 and S2 [No Carotid Bruits] : no carotid bruits [No Abdominal Bruit] : a ~M bruit was not heard ~T in the abdomen [Pedal Pulses Present] : the pedal pulses are present [No Edema] : there was no peripheral edema [No Palpable Aorta] : no palpable aorta [No Extremity Clubbing/Cyanosis] : no extremity clubbing/cyanosis [Soft] : abdomen soft [Non Tender] : non-tender [Non-distended] : non-distended [No Masses] : no abdominal mass palpated [No HSM] : no HSM [Normal Bowel Sounds] : normal bowel sounds [Normal Sphincter Tone] : normal sphincter tone [No Mass] : no mass [Urethral Meatus] : meatus normal [Urinary Bladder Findings] : the bladder was normal on palpation [Scrotum] : the scrotum was normal [Rectal Exam - Seminal Vesicles] : the seminal vesicles were normal [Epididymis] : the epididymides were normal [Testes Tenderness] : no tenderness of the testes [Prostate Tenderness] : the prostate was not tender [No CVA Tenderness] : no CVA  tenderness [No Spinal Tenderness] : no spinal tenderness [No Joint Swelling] : no joint swelling [Grossly Normal Strength/Tone] : grossly normal strength/tone [No Rash] : no rash [Coordination Grossly Intact] : coordination grossly intact [No Focal Deficits] : no focal deficits [Normal Gait] : normal gait [Deep Tendon Reflexes (DTR)] : deep tendon reflexes were 2+ and symmetric [Speech Grossly Normal] : speech grossly normal [Normal Affect] : the affect was normal [Alert and Oriented x3] : oriented to person, place, and time [Normal Mood] : the mood was normal [Normal Insight/Judgement] : insight and judgment were intact [Right Foot Was Examined] : Right foot ~C was examined [Left Foot Was Examined] : left foot ~C was examined [] : both feet [FreeTextEntry1] : slight prostate enlargement  [Normal Anterior Cervical Nodes] : no anterior cervical lymphadenopathy [de-identified] : with murmur  2-3/6 [de-identified] : obese [TWNoteComboBox3] : +2 [TWNoteComboBox4] : +2

## 2023-12-27 NOTE — PLAN
[FreeTextEntry1] : Cardio - Carotid stenosis - s/p  CEA -  DM-  HTN.  Hypertension - Patient was educated about hypertension and the importance of controlling the pressure through lifestyle modification which include low sodium diet and aerobic exercise.  Also discussed the use of prescription medication which included their benefits and their side effects. We discussed the use of ASA 81 mg daily.   Having a BMI less than or equal to 25.  Advised To continue with ramipril 10 mg twice a day.  Amlodipine 5 mg daily.  Advised patient to increase hydrochlorothiazide to 50 mg a day.  Advised to take 25 in the morning 25 at night.  Continue to monitor blood pressure at night.  Increase potassium in diet.  Advise strict low-sodium diet.  Return to office January 5 for repeat blood pressure check  Pulm - Lung  Mass s/p resection-   Follow up w pulmonology and CTSX. Follow-up with CAT scans as discussed.  Endocrinology NIDDM - continue Metformin HCl 500 bid . - continue low carbohydrate/low sugar diet - check hemoglobin A1C and microalbumin.  Advised to become more active.  Refilled medication and test strips to be tested bid.  Return to office next week fasting check hemoglobin A1c comprehensive metabolic.  Calcium level  hyperlipidemia/hypertriglyceridemia - continue with Lipitor and Fenofibrate 160 mg p.o.q.d. and Lipitor ad directed - continue low cholesterol/low fat diet - Return to office for fasting labs.   vitamin D insufficiency - continue vitamin D tablets p.o.q.d. - check vitamin D level today.   GI- GERD - halitosis - Trial of omeprazole  - Screening for prostates CA - Check PSA next Office visit   We discussed the importance of healthy lifestyles which include exercise, weight control and good diet. Patient's questions were answered in full detail. Advise patient if any other concerns arise to please call office to have a discussion.   Patient  education  - COVID-19   Counseling and education provided to the patient.  Advised sign and symptoms of the virus.  Advised contact precautions.  Educated patient on proper hand washing and to participate in social distancing.  completed covid vax  Check spike ab - Consider covid booster  Patient is in full awareness of the plan and agrees to it.  All pt question was answered.    Shingrix - discussed, patient to determine if vaccine is covered under medical benefits of current insurance plan and follow up for 1st dose if interested; otherwise, instructed to follow up at the pharmacy for vaccine.   I spent 32 Minutes with the patient, half of which we discussed finding on physical exam and coordinated care.  As well as reviewed my plans and follow ups. Dragon speech recognition software was used to create portions of this document.  An attempt at proofreading has been made to minimize errors please call if any questions arise.

## 2023-12-27 NOTE — DATA REVIEWED
[No studies available for review at this time.] : No studies available for review at this time. [FreeTextEntry1] : Follow-up with reviewed prior labs from emergency room as well as reviewed labs from emergency room.  Reviewed medication list.

## 2023-12-27 NOTE — ASSESSMENT
[FreeTextEntry1] : Mr. MASON is a 73 year male, with a past medical history as noted above, who present to the office today for blood pressure check.

## 2024-01-05 ENCOUNTER — APPOINTMENT (OUTPATIENT)
Dept: INTERNAL MEDICINE | Facility: CLINIC | Age: 74
End: 2024-01-05
Payer: MEDICARE

## 2024-01-05 VITALS
HEIGHT: 66 IN | TEMPERATURE: 97 F | RESPIRATION RATE: 16 BRPM | DIASTOLIC BLOOD PRESSURE: 70 MMHG | SYSTOLIC BLOOD PRESSURE: 126 MMHG | OXYGEN SATURATION: 95 % | WEIGHT: 200.19 LBS | BODY MASS INDEX: 32.17 KG/M2 | HEART RATE: 94 BPM

## 2024-01-05 DIAGNOSIS — C34.90 MALIGNANT NEOPLASM OF UNSPECIFIED PART OF UNSPECIFIED BRONCHUS OR LUNG: ICD-10-CM

## 2024-01-05 DIAGNOSIS — E55.9 VITAMIN D DEFICIENCY, UNSPECIFIED: ICD-10-CM

## 2024-01-05 DIAGNOSIS — E11.59 TYPE 2 DIABETES MELLITUS WITH OTHER CIRCULATORY COMPLICATIONS: ICD-10-CM

## 2024-01-05 DIAGNOSIS — E78.1 PURE HYPERGLYCERIDEMIA: ICD-10-CM

## 2024-01-05 DIAGNOSIS — I10 ESSENTIAL (PRIMARY) HYPERTENSION: ICD-10-CM

## 2024-01-05 DIAGNOSIS — E78.5 HYPERLIPIDEMIA, UNSPECIFIED: ICD-10-CM

## 2024-01-05 DIAGNOSIS — Z12.5 ENCOUNTER FOR SCREENING FOR MALIGNANT NEOPLASM OF PROSTATE: ICD-10-CM

## 2024-01-05 PROCEDURE — 99214 OFFICE O/P EST MOD 30 MIN: CPT | Mod: 25

## 2024-01-05 PROCEDURE — 36415 COLL VENOUS BLD VENIPUNCTURE: CPT

## 2024-01-05 RX ORDER — HYDROCHLOROTHIAZIDE 25 MG/1
25 TABLET ORAL
Qty: 180 | Refills: 1 | Status: ACTIVE | COMMUNITY

## 2024-01-07 PROBLEM — E11.59 TYPE 2 DIABETES MELLITUS WITH OTHER CIRCULATORY COMPLICATION, WITHOUT LONG-TERM CURRENT USE OF INSULIN: Status: ACTIVE | Noted: 2020-01-13

## 2024-01-07 PROBLEM — I10 HYPERTENSION, BENIGN: Status: ACTIVE | Noted: 2020-01-13

## 2024-01-07 PROBLEM — C34.90 ADENOCARCINOMA OF LUNG: Status: ACTIVE | Noted: 2020-02-24

## 2024-01-07 PROBLEM — E78.1 HYPERTRIGLYCERIDEMIA: Status: ACTIVE | Noted: 2019-04-23

## 2024-01-07 PROBLEM — E78.5 HYPERLIPIDEMIA: Status: ACTIVE | Noted: 2019-04-23

## 2024-01-07 PROBLEM — Z12.5 SCREENING FOR PROSTATE CANCER: Status: ACTIVE | Noted: 2020-06-23

## 2024-01-07 PROBLEM — E55.9 VITAMIN D INSUFFICIENCY: Status: ACTIVE | Noted: 2019-04-23

## 2024-01-07 NOTE — ASSESSMENT
[FreeTextEntry1] : Patient is a 73-year-old male with a past medical history as above who presents for fasting blood work and general follow-up.

## 2024-01-07 NOTE — PLAN
[FreeTextEntry1] : Cardiology hypertension - continue Amlodipine Besylate 5mg p.o.q.d. as directed; continue Hydrochlorothiazide 25mg p.o.q.d. and Ramipril 10 mg qd,  continue low sodium diet  CAD - continue Atorvastatin Calcium 40mg p.o.q.d. as directed< Aspirin and Ramipril, follow up with cardiology Endocrinology hyperglycemia/DM2 - continue Metformin HCl 1000mg p.o.q.d. - continue low carbohydrate/low sugar diet - check hemoglobin A1C and urine for alb/creatinine ratio hyperlipidemia/hypertriglyceridemia - continue Fenofibrate 160mg p.o.q.d. and Niacin 500mg TID p.o.q.d. as directed, Ezetimibe 10 mg qd and Atorvastatin - continue low cholesterol/low fat diet - check FLP borderline-abnormal TFTs - check thyroid panel vitamin D insufficiency - continue Vitamin D tablets p.o.q.d. - check vitamin Dhy Urology s/p bladder surgery - continue to follow up with urologist, Dr. Oliver - check PSA Infectious Disease Prevnar 13 - discussed benefits of receiving the vaccine in detail, refused  check hemoglobin A1C, Albumin/Creatinine Ratio, CBC, CMP, FLP, PSA, and Vitamin D

## 2024-01-07 NOTE — HISTORY OF PRESENT ILLNESS
[FreeTextEntry1] : fasting blood work and general follow-up  [de-identified] : Patient is a 73-year-old male with a past medical history as below who presents for fasting blood work and general follow-up.  Patient states he has been taking Metformin once daily as he noted lightheadedness/dizziness while taking the medication BID. He notes self-recorded blood-glucose levels typically range from 93 to 115. 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 Patient's last colonoscopy was in December of 2019 with gastroenterologist Dr. Arthur Pacheco. Patient states he has been trying to maintain a well-balanced, low carbohydrate diet. He notes occasional CV exercise (walking). Patient has not received the Pneumonia vaccine.   Patient notes having gone to the hospital 10 days ago secondary to a hypertensive episode. He states that he has been drinking and eating adequately. He reports feeling well overall, however, continues to experience some fatigue. He has seen cardiologist Dr. Drummond and had a cardiac workup which was negative.  He increased his HCTZ dose from 25 mg to 50 mg qd when his blood pressure was elevated.  He decreased the HCTZ dose back to 25 mg qd for the past 4 days and his blood pressure has been normal.

## 2024-01-07 NOTE — ADDENDUM
[FreeTextEntry1] : I, Yanelypat Santodonna, acted solely as scribe for Dr. Arnoldo Yoo DO on this date 01/05/2024.  All medical record entries made by the Scribe were at my, Dr. Arnoldo Yoo DO direction and personally dictated by me on 01/05/2024. I have reviewed the chart and agree that the record accurately reflects my personal performance of the history, physical exam, assessment and plan. I have also personally directed, reviewed and agreed with the chart.

## 2024-01-07 NOTE — PHYSICAL EXAM
[No Acute Distress] : no acute distress [Well Nourished] : well nourished [Well Developed] : well developed [Well-Appearing] : well-appearing [Normal Sclera/Conjunctiva] : normal sclera/conjunctiva [PERRL] : pupils equal round and reactive to light [EOMI] : extraocular movements intact [Normal Outer Ear/Nose] : the outer ears and nose were normal in appearance [Normal Oropharynx] : the oropharynx was normal [No JVD] : no jugular venous distention [No Lymphadenopathy] : no lymphadenopathy [Supple] : supple [Thyroid Normal, No Nodules] : the thyroid was normal and there were no nodules present [No Respiratory Distress] : no respiratory distress  [No Accessory Muscle Use] : no accessory muscle use [Clear to Auscultation] : lungs were clear to auscultation bilaterally [Normal Rate] : normal rate  [Regular Rhythm] : with a regular rhythm [Normal S1, S2] : normal S1 and S2 [No Murmur] : no murmur heard [No Carotid Bruits] : no carotid bruits [No Abdominal Bruit] : a ~M bruit was not heard ~T in the abdomen [No Varicosities] : no varicosities [Pedal Pulses Present] : the pedal pulses are present [No Edema] : there was no peripheral edema [No Palpable Aorta] : no palpable aorta [No Extremity Clubbing/Cyanosis] : no extremity clubbing/cyanosis [Soft] : abdomen soft [Non Tender] : non-tender [Non-distended] : non-distended [No Masses] : no abdominal mass palpated [No HSM] : no HSM [Normal Bowel Sounds] : normal bowel sounds [Normal Posterior Cervical Nodes] : no posterior cervical lymphadenopathy [Normal Anterior Cervical Nodes] : no anterior cervical lymphadenopathy [No CVA Tenderness] : no CVA  tenderness [No Spinal Tenderness] : no spinal tenderness [No Joint Swelling] : no joint swelling [Grossly Normal Strength/Tone] : grossly normal strength/tone [No Rash] : no rash [Coordination Grossly Intact] : coordination grossly intact [No Focal Deficits] : no focal deficits [Normal Gait] : normal gait [Deep Tendon Reflexes (DTR)] : deep tendon reflexes were 2+ and symmetric [Normal Affect] : the affect was normal [Normal Insight/Judgement] : insight and judgment were intact [Normal] : affect was normal and insight and judgment were intact [Normal Voice/Communication] : normal voice/communication [Normal TMs] : both tympanic membranes were normal [Normal Nasal Mucosa] : the nasal mucosa was normal [Normal Supraclavicular Nodes] : no supraclavicular lymphadenopathy [Kyphosis] : no kyphosis [Scoliosis] : no scoliosis [Acne] : no acne [Speech Grossly Normal] : speech grossly normal [Memory Grossly Normal] : memory grossly normal [Alert and Oriented x3] : oriented to person, place, and time [Normal Mood] : the mood was normal [de-identified] : obese

## 2024-01-29 NOTE — PATIENT PROFILE ADULT - HEALTH LITERACY
[Negative] : Gastrointestinal [Heart Rate Is Slow] : the heart rate was not slow [Chest Pain] : no chest pain no

## 2024-04-01 ENCOUNTER — APPOINTMENT (OUTPATIENT)
Dept: VASCULAR SURGERY | Facility: CLINIC | Age: 74
End: 2024-04-01
Payer: MEDICARE

## 2024-04-01 DIAGNOSIS — I65.29 OCCLUSION AND STENOSIS OF UNSPECIFIED CAROTID ARTERY: ICD-10-CM

## 2024-04-01 PROCEDURE — 93880 EXTRACRANIAL BILAT STUDY: CPT

## 2024-04-01 PROCEDURE — G0506: CPT

## 2024-04-01 PROCEDURE — 99214 OFFICE O/P EST MOD 30 MIN: CPT | Mod: 25

## 2024-04-01 NOTE — ASSESSMENT
[FreeTextEntry1] : 73 year old male with history of carotid artery stenosis status post right carotid endarterectomy   In the office today, patient underwent a duplex study which demonstrated a widely patent right carotid endarterectomy and < 50% stenosis on the left.  Patient to continue conservative management with aspirin and atorvastatin.  Patient to follow-up in 1 year with repeat duplex.

## 2024-04-01 NOTE — PHYSICAL EXAM
[Normal Breath Sounds] : Normal breath sounds [Normal Heart Sounds] : normal heart sounds [Right Carotid Bruit] : right carotid bruit heard [2+] : left 2+ [No Rash or Lesion] : No rash or lesion [Oriented to Person] : oriented to person [Alert] : alert [Oriented to Place] : oriented to place [Calm] : calm [JVD] : no jugular venous distention  [Ankle Swelling (On Exam)] : not present [Varicose Veins Of Lower Extremities] : not present [] : not present [Abdomen Masses] : No abdominal masses [Skin Ulcer] : no ulcer [de-identified] : appears well  [de-identified] : no facial asymmetry  [de-identified] : Intact

## 2024-04-01 NOTE — HISTORY OF PRESENT ILLNESS
[FreeTextEntry1] : Patient is a 73 year old male with history of carotid artery stenosis status post right carotid endarterectomy who presents to office for follow-up.  Patient currently on aspirin.  Denies sudden vision loss, facial droop, or unilateral weakness. No CVA or TIA.

## 2024-04-19 NOTE — ED ADULT NURSE NOTE - NS_SISCREENINGSR_GEN_ALL_ED
04-19    137  |  101  |  30<H>  ----------------------------<  151<H>  3.8   |  25  |  1.55<H>    Ca    8.7      19 Apr 2024 10:13  Phos  4.1     04-19  Mg     2.1     04-19    TPro  5.3<L>  /  Alb  3.6  /  TBili  0.9  /  DBili  x   /  AST  52<H>  /  ALT  <5<L>  /  AlkPhos  46  04-19  POCT Blood Glucose.: 185 mg/dL (04-19-24 @ 11:27)  A1C with Estimated Average Glucose Result: 4.7 % (04-16-24 @ 06:44)  A1C with Estimated Average Glucose Result: 4.8 % (02-01-24 @ 05:30)  
Negative

## 2024-05-06 LAB
25(OH)D3 SERPL-MCNC: 45.3 NG/ML
ALBUMIN SERPL ELPH-MCNC: 4.9 G/DL
ALP BLD-CCNC: 62 U/L
ALT SERPL-CCNC: 36 U/L
ANION GAP SERPL CALC-SCNC: 14 MMOL/L
AST SERPL-CCNC: 27 U/L
BASOPHILS # BLD AUTO: 0.1 K/UL
BASOPHILS NFR BLD AUTO: 0.7 %
BILIRUB SERPL-MCNC: 0.4 MG/DL
BUN SERPL-MCNC: 36 MG/DL
CALCIUM SERPL-MCNC: 10.8 MG/DL
CHLORIDE SERPL-SCNC: 93 MMOL/L
CHOLEST SERPL-MCNC: 156 MG/DL
CO2 SERPL-SCNC: 27 MMOL/L
CREAT SERPL-MCNC: 1.21 MG/DL
CREAT SPEC-SCNC: 175 MG/DL
EGFR: 64 ML/MIN/1.73M2
EOSINOPHIL # BLD AUTO: 0.49 K/UL
EOSINOPHIL NFR BLD AUTO: 3.2 %
ESTIMATED AVERAGE GLUCOSE: 163 MG/DL
GLUCOSE SERPL-MCNC: 152 MG/DL
HBA1C MFR BLD HPLC: 7.3 %
HCT VFR BLD CALC: 46.1 %
HDLC SERPL-MCNC: 26 MG/DL
HGB BLD-MCNC: 14.3 G/DL
IMM GRANULOCYTES NFR BLD AUTO: 0.6 %
LDLC SERPL CALC-MCNC: 78 MG/DL
LYMPHOCYTES # BLD AUTO: 2.61 K/UL
LYMPHOCYTES NFR BLD AUTO: 17.2 %
MAN DIFF?: NORMAL
MCHC RBC-ENTMCNC: 26.6 PG
MCHC RBC-ENTMCNC: 31 GM/DL
MCV RBC AUTO: 85.7 FL
MICROALBUMIN 24H UR DL<=1MG/L-MCNC: 3.8 MG/DL
MICROALBUMIN/CREAT 24H UR-RTO: 22 MG/G
MONOCYTES # BLD AUTO: 0.94 K/UL
MONOCYTES NFR BLD AUTO: 6.2 %
NEUTROPHILS # BLD AUTO: 10.95 K/UL
NEUTROPHILS NFR BLD AUTO: 72.1 %
NONHDLC SERPL-MCNC: 130 MG/DL
PLATELET # BLD AUTO: 417 K/UL
POTASSIUM SERPL-SCNC: 4.8 MMOL/L
PROT SERPL-MCNC: 8 G/DL
PSA SERPL-MCNC: 0.27 NG/ML
RBC # BLD: 5.38 M/UL
RBC # FLD: 14 %
SODIUM SERPL-SCNC: 135 MMOL/L
TRIGL SERPL-MCNC: 317 MG/DL
WBC # FLD AUTO: 15.18 K/UL

## 2024-09-09 NOTE — CONSULT LETTER
[Dear  ___] : Dear  [unfilled], [Courtesy Letter:] : I had the pleasure of seeing your patient, [unfilled], in my office today. [Please see my note below.] : Please see my note below. [Sincerely,] : Sincerely, [FreeTextEntry2] : Dr. Mcmahon (Pulm/Ref)\par  Dr. Arnoldo Yoo (PCP)\par  Dr. Lopez (Card)  [FreeTextEntry3] : Jordin Ordonez MD\par  Attending Surgeon\par  Division of Thoracic Surgery\par  , [facility] [employer] at [facility]\par

## 2024-09-09 NOTE — HISTORY OF PRESENT ILLNESS
[FreeTextEntry1] : The patient is a 74 year old male who is s/p Left VATS, LLL Lobectomy on 02/11/2020 for (pT1cN0) Stage IA3 adenocarcinoma.   CT chest on 09/12/2024:  -   Depression screening completed on 12/27/2023.   Patient is here today for a follow up.

## 2024-09-09 NOTE — ASSESSMENT
[FreeTextEntry1] : The patient is a 74 year old male who is s/p Left VATS, left lower lobe Lobectomy on 02/11/2020 for (pT1cN0) Stage IA3 adenocarcinoma.

## 2024-09-12 ENCOUNTER — OUTPATIENT (OUTPATIENT)
Dept: OUTPATIENT SERVICES | Facility: HOSPITAL | Age: 74
LOS: 1 days | End: 2024-09-12
Payer: MEDICARE

## 2024-09-12 ENCOUNTER — APPOINTMENT (OUTPATIENT)
Dept: CT IMAGING | Facility: CLINIC | Age: 74
End: 2024-09-12
Payer: MEDICARE

## 2024-09-12 DIAGNOSIS — N43.40 SPERMATOCELE OF EPIDIDYMIS, UNSPECIFIED: Chronic | ICD-10-CM

## 2024-09-12 DIAGNOSIS — Z98.890 OTHER SPECIFIED POSTPROCEDURAL STATES: Chronic | ICD-10-CM

## 2024-09-12 DIAGNOSIS — C34.90 MALIGNANT NEOPLASM OF UNSPECIFIED PART OF UNSPECIFIED BRONCHUS OR LUNG: ICD-10-CM

## 2024-09-12 PROCEDURE — 71250 CT THORAX DX C-: CPT

## 2024-09-12 PROCEDURE — 71250 CT THORAX DX C-: CPT | Mod: 26,MH

## 2024-09-18 ENCOUNTER — NON-APPOINTMENT (OUTPATIENT)
Age: 74
End: 2024-09-18

## 2024-09-18 ENCOUNTER — APPOINTMENT (OUTPATIENT)
Dept: THORACIC SURGERY | Facility: CLINIC | Age: 74
End: 2024-09-18
Payer: MEDICARE

## 2024-09-18 VITALS
OXYGEN SATURATION: 94 % | WEIGHT: 201 LBS | HEIGHT: 66 IN | HEART RATE: 91 BPM | BODY MASS INDEX: 32.3 KG/M2 | SYSTOLIC BLOOD PRESSURE: 161 MMHG | DIASTOLIC BLOOD PRESSURE: 74 MMHG

## 2024-09-18 DIAGNOSIS — C34.90 MALIGNANT NEOPLASM OF UNSPECIFIED PART OF UNSPECIFIED BRONCHUS OR LUNG: ICD-10-CM

## 2024-09-18 PROCEDURE — 99214 OFFICE O/P EST MOD 30 MIN: CPT

## 2024-09-18 NOTE — PHYSICAL EXAM
[Auscultation Breath Sounds / Voice Sounds] : lungs were clear to auscultation bilaterally [] : no respiratory distress [Examination Of The Chest] : the chest was normal in appearance [Chest Visual Inspection Thoracic Asymmetry] : no chest asymmetry [Diminished Respiratory Excursion] : normal chest expansion

## 2024-09-18 NOTE — CONSULT LETTER
[FreeTextEntry2] : Dr. Mcmahon (Pulm/Ref)\par  Dr. Arnoldo Yoo (PCP)\par  Dr. Lopez (Card)  [FreeTextEntry3] : Jordin Ordonez MD\par  Attending Surgeon\par  Division of Thoracic Surgery\par  , [facility] [employer] at [facility]\par

## 2024-09-18 NOTE — CONSULT LETTER
[FreeTextEntry2] : Dr. Mcmahon (Pulm/Ref)\par  Dr. Arnoldo Yoo (PCP)\par  Dr. Lopez (Card)  [FreeTextEntry3] : Jordin Ordonez MD\par  Attending Surgeon\par  Division of Thoracic Surgery\par  , [facility] [employer] [facility] at [facility]\par

## 2024-09-18 NOTE — ASSESSMENT
[FreeTextEntry1] : The patient is a 74 year old male who is s/p Left VATS, left lower lobe Lobectomy on 02/11/2020 for (pT1cN0) Stage IA3 adenocarcinoma.  CT scan on 9/12/24 reviewed, and it showed no evidence of recurrence, I recommended patient to return to office in 1 year w/ CT Chest w/o contrast as surveillance.    I, SANDRA Gupta, personally performed the evaluation and management (E/M) services for this established patient who presents today with (a) new problem(s)/exacerbation of (an) existing condition(s). That E/M includes conducting the examination, assessing all new/exacerbated conditions, and establishing a new plan of care. Today, my ACP, Alyssa Beck NP was here to observe my evaluation and management services for this new problem/exacerbated condition to be followed going forward.

## 2024-09-18 NOTE — HISTORY OF PRESENT ILLNESS
Low IGF-1 for age, but would be consistent with puberty stage and bone age as of March 2023 evaluation.     Please inquire if missing any doses.    Likely will have continue growth hormone 2.0 mg once daily.    If having difficult with adherence, we can discuss switching to weekly GH medication.       Free T4 is within normal, but lower half of normal reference range  If Linda has been consistent with taking levothyroxine, then recommend increase in levothyroxine to 88 mcg daily and repeat free T4 (TSH not needed) in another 2 months, sooner if needed.             Normal AM cortisol and ACTH - does not have central or primary adrenal insufficiency.               Ref range:    Androstenedione is low for age but within normal for most recent bone age of 13 years (March 2023).            Similarly, 17-OH progesterone is low for age but likely within low-normal range for bone age and Porter stage.           Aldosterone is within normal for age, slightly higher than expected for bone age; reference range not listed for puberty stage.      Renin is normal.     Overall appears to have normal adrenal gland function considering bone age and puberty stage.                  Testosterone, free testosterone, and SHBG all appopriate.            CBC is normal, ferritin is normal (low-normal).    Recommend taking a daily MVI with iron.     CMP is normal  HbA1c is normal - no diabetes or likely hypoglycemia        [FreeTextEntry1] : The patient is a 74 year old male who is s/p Left VATS, LLL Lobectomy on 02/11/2020 for (pT1cN0) Stage IA3 adenocarcinoma.   CT chest on 09/12/2024:  - Status post left lower lobectomy. - There are scattered small pulmonary nodules (some calcified) unchanged. For reference a 4 mm nodule in the left upper lobe (3:29) is stable. A 5 mm peripheral groundglass nodule in the left upper lobe is unchanged (3:99). - Scattered peripheral small branching opacities are again seen and likely distal airway impaction unchanged.  Depression screening completed on 9/18/24.  Patient is here today for a follow up. Denies SOB, CP, cough.

## 2024-09-18 NOTE — CONSULT LETTER
[FreeTextEntry2] : Dr. Mcmahon (Pulm/Ref)\par  Dr. Arnoldo Yoo (PCP)\par  Dr. Lopez (Card)  [FreeTextEntry3] : Jordin Ordonez MD\par  Attending Surgeon\par  Division of Thoracic Surgery\par  , [facility] [employer] at [facility]\par

## 2024-09-18 NOTE — PHYSICAL EXAM
[] : no respiratory distress [Auscultation Breath Sounds / Voice Sounds] : lungs were clear to auscultation bilaterally [Examination Of The Chest] : the chest was normal in appearance [Chest Visual Inspection Thoracic Asymmetry] : no chest asymmetry [Diminished Respiratory Excursion] : normal chest expansion

## 2024-09-18 NOTE — CONSULT LETTER
[FreeTextEntry2] : Dr. Mcmahon (Pulm/Ref)\par  Dr. Arnoldo Yoo (PCP)\par  Dr. Lopez (Card)  [FreeTextEntry3] : Jordin Ordonez MD\par  Attending Surgeon\par  Division of Thoracic Surgery\par  , [facility] [employer] at [facility]\par

## 2024-09-18 NOTE — HISTORY OF PRESENT ILLNESS
[FreeTextEntry1] : The patient is a 74 year old male who is s/p Left VATS, LLL Lobectomy on 02/11/2020 for (pT1cN0) Stage IA3 adenocarcinoma.   CT chest on 09/12/2024:  - Status post left lower lobectomy. - There are scattered small pulmonary nodules (some calcified) unchanged. For reference a 4 mm nodule in the left upper lobe (3:29) is stable. A 5 mm peripheral groundglass nodule in the left upper lobe is unchanged (3:99). - Scattered peripheral small branching opacities are again seen and likely distal airway impaction unchanged.  Depression screening completed on 9/18/24.  Patient is here today for a follow up. Denies SOB, CP, cough.

## 2025-03-31 ENCOUNTER — APPOINTMENT (OUTPATIENT)
Dept: VASCULAR SURGERY | Facility: CLINIC | Age: 75
End: 2025-03-31

## 2025-05-21 ENCOUNTER — APPOINTMENT (OUTPATIENT)
Dept: VASCULAR SURGERY | Facility: CLINIC | Age: 75
End: 2025-05-21
Payer: MEDICARE

## 2025-05-21 PROCEDURE — 93880 EXTRACRANIAL BILAT STUDY: CPT

## 2025-09-10 ENCOUNTER — APPOINTMENT (OUTPATIENT)
Dept: CT IMAGING | Facility: CLINIC | Age: 75
End: 2025-09-10
Payer: MEDICARE

## 2025-09-10 PROCEDURE — 71250 CT THORAX DX C-: CPT | Mod: 26

## 2025-09-12 ENCOUNTER — NON-APPOINTMENT (OUTPATIENT)
Age: 75
End: 2025-09-12

## 2025-09-17 ENCOUNTER — APPOINTMENT (OUTPATIENT)
Dept: THORACIC SURGERY | Facility: CLINIC | Age: 75
End: 2025-09-17
Payer: MEDICARE

## 2025-09-17 VITALS
BODY MASS INDEX: 31.98 KG/M2 | DIASTOLIC BLOOD PRESSURE: 75 MMHG | WEIGHT: 199 LBS | SYSTOLIC BLOOD PRESSURE: 178 MMHG | HEIGHT: 66 IN | OXYGEN SATURATION: 97 % | HEART RATE: 90 BPM

## 2025-09-17 DIAGNOSIS — R91.1 SOLITARY PULMONARY NODULE: ICD-10-CM

## 2025-09-17 PROCEDURE — 99213 OFFICE O/P EST LOW 20 MIN: CPT
